# Patient Record
Sex: MALE | Race: OTHER | HISPANIC OR LATINO
[De-identification: names, ages, dates, MRNs, and addresses within clinical notes are randomized per-mention and may not be internally consistent; named-entity substitution may affect disease eponyms.]

---

## 2018-02-20 PROBLEM — Z00.00 ENCOUNTER FOR PREVENTIVE HEALTH EXAMINATION: Status: ACTIVE | Noted: 2018-02-20

## 2018-02-27 ENCOUNTER — APPOINTMENT (OUTPATIENT)
Dept: ORTHOPEDIC SURGERY | Facility: CLINIC | Age: 53
End: 2018-02-27

## 2018-03-15 ENCOUNTER — APPOINTMENT (OUTPATIENT)
Dept: RHEUMATOLOGY | Facility: CLINIC | Age: 53
End: 2018-03-15
Payer: MEDICAID

## 2018-03-15 VITALS
WEIGHT: 172 LBS | RESPIRATION RATE: 17 BRPM | OXYGEN SATURATION: 99 % | HEIGHT: 64 IN | TEMPERATURE: 98.4 F | SYSTOLIC BLOOD PRESSURE: 130 MMHG | BODY MASS INDEX: 29.37 KG/M2 | DIASTOLIC BLOOD PRESSURE: 80 MMHG | HEART RATE: 65 BPM

## 2018-03-15 DIAGNOSIS — K46.9 UNSPECIFIED ABDOMINAL HERNIA W/OUT OBSTRUCTION OR GANGRENE: ICD-10-CM

## 2018-03-15 DIAGNOSIS — Z78.9 OTHER SPECIFIED HEALTH STATUS: ICD-10-CM

## 2018-03-15 DIAGNOSIS — I10 ESSENTIAL (PRIMARY) HYPERTENSION: ICD-10-CM

## 2018-03-15 DIAGNOSIS — Z86.39 PERSONAL HISTORY OF OTHER ENDOCRINE, NUTRITIONAL AND METABOLIC DISEASE: ICD-10-CM

## 2018-03-15 PROCEDURE — 36415 COLL VENOUS BLD VENIPUNCTURE: CPT

## 2018-03-15 PROCEDURE — 99204 OFFICE O/P NEW MOD 45 MIN: CPT | Mod: 25

## 2018-03-15 RX ORDER — OMEPRAZOLE 20 MG/1
TABLET, DELAYED RELEASE ORAL
Refills: 0 | Status: ACTIVE | COMMUNITY

## 2018-03-15 RX ORDER — ENALAPRIL MALEATE 20 MG/1
20 TABLET ORAL
Refills: 0 | Status: ACTIVE | COMMUNITY

## 2018-03-21 ENCOUNTER — APPOINTMENT (OUTPATIENT)
Dept: ORTHOPEDIC SURGERY | Facility: CLINIC | Age: 53
End: 2018-03-21
Payer: MEDICAID

## 2018-03-21 ENCOUNTER — APPOINTMENT (OUTPATIENT)
Dept: NEUROLOGY | Facility: CLINIC | Age: 53
End: 2018-03-21
Payer: MEDICAID

## 2018-03-21 VITALS
DIASTOLIC BLOOD PRESSURE: 76 MMHG | WEIGHT: 172 LBS | SYSTOLIC BLOOD PRESSURE: 130 MMHG | BODY MASS INDEX: 29.37 KG/M2 | HEIGHT: 64 IN

## 2018-03-21 PROCEDURE — 99204 OFFICE O/P NEW MOD 45 MIN: CPT

## 2018-04-04 ENCOUNTER — APPOINTMENT (OUTPATIENT)
Dept: ORTHOPEDIC SURGERY | Facility: CLINIC | Age: 53
End: 2018-04-04
Payer: MEDICAID

## 2018-04-04 PROCEDURE — 99204 OFFICE O/P NEW MOD 45 MIN: CPT

## 2018-04-04 PROCEDURE — 73564 X-RAY EXAM KNEE 4 OR MORE: CPT | Mod: LT

## 2018-04-06 ENCOUNTER — FORM ENCOUNTER (OUTPATIENT)
Age: 53
End: 2018-04-06

## 2018-04-07 ENCOUNTER — APPOINTMENT (OUTPATIENT)
Dept: MRI IMAGING | Facility: CLINIC | Age: 53
End: 2018-04-07
Payer: MEDICAID

## 2018-04-07 ENCOUNTER — OUTPATIENT (OUTPATIENT)
Dept: OUTPATIENT SERVICES | Facility: HOSPITAL | Age: 53
LOS: 1 days | End: 2018-04-07
Payer: MEDICAID

## 2018-04-07 DIAGNOSIS — G44.89 OTHER HEADACHE SYNDROME: ICD-10-CM

## 2018-04-07 PROCEDURE — 70551 MRI BRAIN STEM W/O DYE: CPT | Mod: 26

## 2018-04-07 PROCEDURE — 70551 MRI BRAIN STEM W/O DYE: CPT

## 2018-04-11 ENCOUNTER — OUTPATIENT (OUTPATIENT)
Dept: OUTPATIENT SERVICES | Facility: HOSPITAL | Age: 53
LOS: 1 days | End: 2018-04-11
Payer: COMMERCIAL

## 2018-04-11 DIAGNOSIS — Z51.89 ENCOUNTER FOR OTHER SPECIFIED AFTERCARE: ICD-10-CM

## 2018-04-11 DIAGNOSIS — M25.561 PAIN IN RIGHT KNEE: ICD-10-CM

## 2018-04-11 DIAGNOSIS — M25.562 PAIN IN LEFT KNEE: ICD-10-CM

## 2018-04-23 ENCOUNTER — APPOINTMENT (OUTPATIENT)
Dept: NEUROLOGY | Facility: CLINIC | Age: 53
End: 2018-04-23
Payer: MEDICAID

## 2018-04-23 VITALS
BODY MASS INDEX: 29.37 KG/M2 | DIASTOLIC BLOOD PRESSURE: 84 MMHG | WEIGHT: 172 LBS | SYSTOLIC BLOOD PRESSURE: 126 MMHG | HEIGHT: 64 IN

## 2018-04-23 DIAGNOSIS — Z86.59 PERSONAL HISTORY OF OTHER MENTAL AND BEHAVIORAL DISORDERS: ICD-10-CM

## 2018-04-23 PROCEDURE — 99214 OFFICE O/P EST MOD 30 MIN: CPT

## 2018-05-10 PROCEDURE — 97140 MANUAL THERAPY 1/> REGIONS: CPT

## 2018-05-10 PROCEDURE — 97010 HOT OR COLD PACKS THERAPY: CPT

## 2018-05-10 PROCEDURE — 97110 THERAPEUTIC EXERCISES: CPT

## 2018-05-10 PROCEDURE — 97162 PT EVAL MOD COMPLEX 30 MIN: CPT

## 2018-05-18 ENCOUNTER — APPOINTMENT (OUTPATIENT)
Dept: RHEUMATOLOGY | Facility: CLINIC | Age: 53
End: 2018-05-18
Payer: MEDICAID

## 2018-05-18 VITALS
SYSTOLIC BLOOD PRESSURE: 120 MMHG | HEART RATE: 62 BPM | DIASTOLIC BLOOD PRESSURE: 72 MMHG | RESPIRATION RATE: 15 BRPM | OXYGEN SATURATION: 98 % | TEMPERATURE: 97.7 F

## 2018-05-18 DIAGNOSIS — M13.0 POLYARTHRITIS, UNSPECIFIED: ICD-10-CM

## 2018-05-18 PROBLEM — Z00.00 ENCOUNTER FOR PREVENTIVE HEALTH EXAMINATION: Noted: 2018-05-18

## 2018-05-18 PROCEDURE — 99214 OFFICE O/P EST MOD 30 MIN: CPT

## 2018-06-06 ENCOUNTER — APPOINTMENT (OUTPATIENT)
Dept: ORTHOPEDIC SURGERY | Facility: CLINIC | Age: 53
End: 2018-06-06

## 2018-06-11 ENCOUNTER — APPOINTMENT (OUTPATIENT)
Dept: NEUROLOGY | Facility: CLINIC | Age: 53
End: 2018-06-11

## 2018-06-11 ENCOUNTER — APPOINTMENT (OUTPATIENT)
Dept: ORTHOPEDIC SURGERY | Facility: CLINIC | Age: 53
End: 2018-06-11

## 2018-06-19 ENCOUNTER — APPOINTMENT (OUTPATIENT)
Dept: RHEUMATOLOGY | Facility: CLINIC | Age: 53
End: 2018-06-19

## 2018-07-30 ENCOUNTER — MEDICATION RENEWAL (OUTPATIENT)
Age: 53
End: 2018-07-30

## 2018-07-30 ENCOUNTER — RX RENEWAL (OUTPATIENT)
Age: 53
End: 2018-07-30

## 2018-07-30 DIAGNOSIS — G44.89 OTHER HEADACHE SYNDROME: ICD-10-CM

## 2018-10-29 ENCOUNTER — OTHER (OUTPATIENT)
Age: 53
End: 2018-10-29

## 2019-01-24 LAB
25(OH)D3 SERPL-MCNC: 34.5 NG/ML
A PHAGOCYTOPH IGG TITR SER IF: NORMAL TITER
ALBUMIN SERPL ELPH-MCNC: 4.1 G/DL
ALP BLD-CCNC: 97 U/L
ALT SERPL-CCNC: 30 U/L
ANA SER IF-ACNC: NEGATIVE
ANION GAP SERPL CALC-SCNC: 12 MMOL/L
APPEARANCE: CLEAR
AST SERPL-CCNC: 21 U/L
B BURGDOR AB SER QL IA: NEGATIVE
B BURGDOR AB SER-IMP: NEGATIVE
B BURGDOR IGM PATRN SER IB-IMP: NEGATIVE
B BURGDOR18/20KD IGM SER QL IB: NORMAL
B BURGDOR18KD IGG SER QL IB: NORMAL
B BURGDOR23KD IGG SER QL IB: NORMAL
B BURGDOR23KD IGM SER QL IB: NORMAL
B BURGDOR28KD AB SER QL IB: NORMAL
B BURGDOR28KD IGG SER QL IB: NORMAL
B BURGDOR30KD AB SER QL IB: NORMAL
B BURGDOR30KD IGG SER QL IB: NORMAL
B BURGDOR31KD IGG SER QL IB: NORMAL
B BURGDOR31KD IGM SER QL IB: NORMAL
B BURGDOR39KD IGG SER QL IB: NORMAL
B BURGDOR39KD IGM SER QL IB: NORMAL
B BURGDOR41KD IGG SER QL IB: PRESENT
B BURGDOR41KD IGM SER QL IB: NORMAL
B BURGDOR45KD AB SER QL IB: NORMAL
B BURGDOR45KD IGG SER QL IB: NORMAL
B BURGDOR58KD AB SER QL IB: NORMAL
B BURGDOR58KD IGG SER QL IB: NORMAL
B BURGDOR66KD IGG SER QL IB: NORMAL
B BURGDOR66KD IGM SER QL IB: NORMAL
B BURGDOR93KD IGG SER QL IB: NORMAL
B BURGDOR93KD IGM SER QL IB: NORMAL
B MICROTI IGG TITR SER: NORMAL TITER
BACTERIA: NEGATIVE
BASOPHILS # BLD AUTO: 0.02 K/UL
BASOPHILS NFR BLD AUTO: 0.2 %
BILIRUB SERPL-MCNC: 0.3 MG/DL
BILIRUBIN URINE: NEGATIVE
BLOOD URINE: ABNORMAL
BUN SERPL-MCNC: 15 MG/DL
CALCIUM SERPL-MCNC: 9.6 MG/DL
CCP AB SER IA-ACNC: <8 UNITS
CHLORIDE SERPL-SCNC: 103 MMOL/L
CO2 SERPL-SCNC: 27 MMOL/L
COLOR: YELLOW
CREAT SERPL-MCNC: 0.93 MG/DL
CRP SERPL-MCNC: 0.3 MG/DL
DSDNA AB SER-ACNC: <12 IU/ML
E CHAFFEENSIS IGG TITR SER IF: NORMAL TITER
ENA RNP AB SER IA-ACNC: 0.8 AL
ENA SM AB SER IA-ACNC: <0.2 AL
ENA SS-A AB SER IA-ACNC: <0.2 AL
ENA SS-B AB SER IA-ACNC: <0.2 AL
EOSINOPHIL # BLD AUTO: 0.2 K/UL
EOSINOPHIL NFR BLD AUTO: 2.2 %
ERYTHROCYTE [SEDIMENTATION RATE] IN BLOOD BY WESTERGREN METHOD: 23 MM/HR
GLUCOSE QUALITATIVE U: NEGATIVE MG/DL
GLUCOSE SERPL-MCNC: 101 MG/DL
HCT VFR BLD CALC: 43.6 %
HGB BLD-MCNC: 14.3 G/DL
IMM GRANULOCYTES NFR BLD AUTO: 0.2 %
KETONES URINE: NEGATIVE
LEUKOCYTE ESTERASE URINE: NEGATIVE
LYMPHOCYTES # BLD AUTO: 3.32 K/UL
LYMPHOCYTES NFR BLD AUTO: 36.7 %
MAN DIFF?: NORMAL
MCHC RBC-ENTMCNC: 29.6 PG
MCHC RBC-ENTMCNC: 32.8 GM/DL
MCV RBC AUTO: 90.3 FL
MICROSCOPIC-UA: NORMAL
MONOCYTES # BLD AUTO: 0.62 K/UL
MONOCYTES NFR BLD AUTO: 6.9 %
NEUTROPHILS # BLD AUTO: 4.87 K/UL
NEUTROPHILS NFR BLD AUTO: 53.8 %
NITRITE URINE: NEGATIVE
PH URINE: 5.5
PLATELET # BLD AUTO: 314 K/UL
POTASSIUM SERPL-SCNC: 4.6 MMOL/L
PROT SERPL-MCNC: 7.8 G/DL
PROTEIN URINE: NEGATIVE MG/DL
RBC # BLD: 4.83 M/UL
RBC # FLD: 13.5 %
RED BLOOD CELLS URINE: 19 /HPF
RF+CCP IGG SER-IMP: NEGATIVE
RHEUMATOID FACT SER QL: 8 IU/ML
SODIUM SERPL-SCNC: 142 MMOL/L
SPECIFIC GRAVITY URINE: 1.01
SQUAMOUS EPITHELIAL CELLS: 0 /HPF
THYROGLOB AB SERPL-ACNC: <20 IU/ML
THYROPEROXIDASE AB SERPL IA-ACNC: <10 IU/ML
URATE SERPL-MCNC: 6.6 MG/DL
UROBILINOGEN URINE: NEGATIVE MG/DL
WBC # FLD AUTO: 9.05 K/UL
WHITE BLOOD CELLS URINE: 0 /HPF

## 2020-10-28 ENCOUNTER — APPOINTMENT (OUTPATIENT)
Dept: RHEUMATOLOGY | Facility: CLINIC | Age: 55
End: 2020-10-28
Payer: MEDICAID

## 2020-10-28 VITALS
BODY MASS INDEX: 29.88 KG/M2 | TEMPERATURE: 98.3 F | HEART RATE: 67 BPM | RESPIRATION RATE: 17 BRPM | OXYGEN SATURATION: 98 % | SYSTOLIC BLOOD PRESSURE: 108 MMHG | WEIGHT: 175 LBS | DIASTOLIC BLOOD PRESSURE: 60 MMHG | HEIGHT: 64 IN

## 2020-10-28 PROCEDURE — 99214 OFFICE O/P EST MOD 30 MIN: CPT | Mod: 25

## 2020-10-28 PROCEDURE — 99072 ADDL SUPL MATRL&STAF TM PHE: CPT

## 2020-10-28 RX ORDER — AMITRIPTYLINE HYDROCHLORIDE 25 MG/1
25 TABLET, FILM COATED ORAL
Qty: 30 | Refills: 2 | Status: DISCONTINUED | COMMUNITY
Start: 2018-05-18 | End: 2020-10-28

## 2020-10-28 RX ORDER — CYCLOBENZAPRINE HYDROCHLORIDE 7.5 MG/1
TABLET, FILM COATED ORAL
Refills: 0 | Status: DISCONTINUED | COMMUNITY
End: 2020-10-28

## 2020-10-28 RX ORDER — MIRTAZAPINE 7.5 MG/1
TABLET, FILM COATED ORAL
Refills: 0 | Status: DISCONTINUED | COMMUNITY
End: 2020-10-28

## 2020-12-31 ENCOUNTER — RX RENEWAL (OUTPATIENT)
Age: 55
End: 2020-12-31

## 2021-01-27 ENCOUNTER — APPOINTMENT (OUTPATIENT)
Dept: RHEUMATOLOGY | Facility: CLINIC | Age: 56
End: 2021-01-27

## 2021-02-17 ENCOUNTER — APPOINTMENT (OUTPATIENT)
Dept: RHEUMATOLOGY | Facility: CLINIC | Age: 56
End: 2021-02-17
Payer: MEDICAID

## 2021-02-17 VITALS
TEMPERATURE: 97.1 F | WEIGHT: 185 LBS | OXYGEN SATURATION: 96 % | DIASTOLIC BLOOD PRESSURE: 84 MMHG | RESPIRATION RATE: 17 BRPM | HEART RATE: 80 BPM | BODY MASS INDEX: 31.58 KG/M2 | SYSTOLIC BLOOD PRESSURE: 120 MMHG | HEIGHT: 64 IN

## 2021-02-17 DIAGNOSIS — M54.5 LOW BACK PAIN: ICD-10-CM

## 2021-02-17 DIAGNOSIS — M22.42 CHONDROMALACIA PATELLAE, LEFT KNEE: ICD-10-CM

## 2021-02-17 DIAGNOSIS — M22.41 CHONDROMALACIA PATELLAE, RIGHT KNEE: ICD-10-CM

## 2021-02-17 DIAGNOSIS — G89.29 LOW BACK PAIN: ICD-10-CM

## 2021-02-17 PROCEDURE — 96372 THER/PROPH/DIAG INJ SC/IM: CPT

## 2021-02-17 PROCEDURE — 99072 ADDL SUPL MATRL&STAF TM PHE: CPT

## 2021-02-17 PROCEDURE — 99214 OFFICE O/P EST MOD 30 MIN: CPT | Mod: 25

## 2021-02-17 RX ORDER — METHYLPRED ACET/NACL,ISO-OS/PF 40 MG/ML
40 VIAL (ML) INJECTION
Qty: 1 | Refills: 0 | Status: COMPLETED | OUTPATIENT
Start: 2021-02-17

## 2021-02-17 RX ORDER — ETODOLAC 500 MG/1
500 TABLET, FILM COATED, EXTENDED RELEASE ORAL
Qty: 30 | Refills: 1 | Status: DISCONTINUED | COMMUNITY
Start: 2020-10-28 | End: 2021-02-17

## 2021-02-17 RX ORDER — NAPROXEN 500 MG/1
500 TABLET ORAL
Qty: 60 | Refills: 0 | Status: DISCONTINUED | COMMUNITY
Start: 2018-03-15 | End: 2021-02-17

## 2021-02-17 RX ORDER — DULOXETINE HYDROCHLORIDE 30 MG/1
30 CAPSULE, DELAYED RELEASE PELLETS ORAL
Qty: 90 | Refills: 0 | Status: DISCONTINUED | COMMUNITY
Start: 2020-10-28 | End: 2021-02-17

## 2021-02-17 RX ADMIN — METHYLPREDNISOLONE ACETATE MG/ML: 40 INJECTION, SUSPENSION INTRA-ARTICULAR; INTRALESIONAL; INTRAMUSCULAR; SOFT TISSUE at 00:00

## 2021-02-17 NOTE — CONSULT LETTER
[Dear  ___] : Dear  [unfilled], [Consult Letter:] : I had the pleasure of evaluating your patient, [unfilled]. [Please see my note below.] : Please see my note below. [Consult Closing:] : Thank you very much for allowing me to participate in the care of this patient.  If you have any questions, please do not hesitate to contact me. [Sincerely,] : Sincerely, [FreeTextEntry2] :  [phone] [FreeTextEntry3] : Vincent Aguilar II, MD\par \par Division of Rheumatology\par [facility]\par    [facility] &\par    Rheumatology at [place],\par    [facility]\par [facility] [employer] [facility]\par \par Office: [phone]

## 2021-02-17 NOTE — ASSESSMENT
[FreeTextEntry1] : 1. OA - hands and knees. Increasing polyarthralgia w/ degenerative and hypertrophic changes in hands/knees. No inflammatory arthritis\par 2. Chronic pain syndrome, FMS - reports and exam w/ tender points. Appears more bothersome now- tender points much more tender, fatigue increased. No response to SNRI, TCA, gabapentin. Discussed Lyrica today\par \par - start diclofenac 75mg BID\par - IM Depomedrol 40mg today in LUE\par - start Lyrica 100mg BID, will need prior auth- d/w patient the process\par \par RTO 2 mos

## 2021-02-17 NOTE — HISTORY OF PRESENT ILLNESS
[___ Month(s) Ago] : [unfilled] month(s) ago [FreeTextEntry1] : Last seen 10/2020\par Mostly c/o hand pain, wrist pain - denies swelling, redness, warmth\par Diffuse body pains in elbows at medial/lat epicondyles, bicipital tendons, shoulders, upper and lower  points, gluteal tenderness, lateral hip pain, knee pain in joint and inferomedially, pain in feet.\par PRevious labs normal for inflammatory markers and negative serologies- no RA or inflammatory systemic disease.\par Discussed OA and degenerative arthritis today, as well as FMS\par Pain 6-7/10 a/w 15 min AM stiffness\par ROS otherwise unchanged-  denies constitutional sxs of fever/chills, weight loss, alopecia, oral/nasal ulcers, sicca sxs, CP/SOB, hematuria/frothy urine, myalgias, Raynaud's, rash, nodules, or photosensitivity. \par Currently on gabapentin, duloxetine 30mg/d, etodolac ER 500mg day without any effects on his pain\par DIscussed need for activity - remains active with work but does not do any stretching or ROM. [de-identified] : \par \par All other ROS negative except as mentioned in HPI.

## 2021-02-17 NOTE — PHYSICAL EXAM
[General Appearance - Alert] : alert [General Appearance - In No Acute Distress] : in no acute distress [General Appearance - Well Nourished] : well nourished [General Appearance - Well Developed] : well developed [General Appearance - Well-Appearing] : healthy appearing [Sclera] : the sclera and conjunctiva were normal [PERRL With Normal Accommodation] : pupils were equal in size, round, and reactive to light [Extraocular Movements] : extraocular movements were intact [Outer Ear] : the ears and nose were normal in appearance [Hearing Threshold Finger Rub Not [place]] : hearing was normal [Examination Of The Oral Cavity] : the lips and gums were normal [Nasal Cavity] : the nasal mucosa and septum were normal [Neck Appearance] : the appearance of the neck was normal [Oropharynx] : the oropharynx was normal [Neck Cervical Mass (___cm)] : no neck mass was observed [Jugular Venous Distention Increased] : there was no jugular-venous distention [Thyroid Diffuse Enlargement] : the thyroid was not enlarged [Thyroid Nodule] : there were no palpable thyroid nodules [Respiration, Rhythm And Depth] : normal respiratory rhythm and effort [Auscultation Breath Sounds / Voice Sounds] : lungs were clear to auscultation bilaterally [Heart Rate And Rhythm] : heart rate was normal and rhythm regular [Heart Sounds] : normal S1 and S2 [Heart Sounds Gallop] : no gallops [Murmurs] : no murmurs [Heart Sounds Pericardial Friction Rub] : no pericardial rub [Full Pulse] : the pedal pulses are present [Edema] : there was no peripheral edema [Veins - Varicosity Changes] : there were no varicosital changes [Bowel Sounds] : normal bowel sounds [Abdomen Soft] : soft [Abdomen Tenderness] : non-tender [Abdomen Mass (___ Cm)] : no abdominal mass palpated [Cervical Lymph Nodes Enlarged Posterior Bilaterally] : posterior cervical [Cervical Lymph Nodes Enlarged Anterior Bilaterally] : anterior cervical [Supraclavicular Lymph Nodes Enlarged Bilaterally] : supraclavicular [No CVA Tenderness] : no ~M costovertebral angle tenderness [Nail Clubbing] : no clubbing  or cyanosis of the fingernails [Abnormal Walk] : normal gait [Musculoskeletal - Swelling] : no joint swelling seen [Motor Tone] : muscle strength and tone were normal [Skin Color & Pigmentation] : normal skin color and pigmentation [Skin Turgor] : normal skin turgor [] : no rash [Skin Lesions] : no skin lesions [Deep Tendon Reflexes (DTR)] : deep tendon reflexes were 2+ and symmetric [Sensation] : the sensory exam was normal to light touch and pinprick [Motor Exam] : the motor exam was normal [No Focal Deficits] : no focal deficits [Oriented To Time, Place, And Person] : oriented to person, place, and time [Impaired Insight] : insight and judgment were intact [Affect] : the affect was normal [Mood] : the mood was normal [FreeTextEntry1] : \par Hands- tender MCPs and PIPs w/o synovitis. Mild degen changes\par Wrists- normal\par Elbows- normal, tender lateral > medial epicondyles w/o swelling\par Shoulders- Tender ROM, intact. Tender biciptal tendons upper > lower bilaterally\par Hips- normal ROM, tender lateral greater trochanters\par Knees- Patellofemoral crepitus bilaterally without effusion.  Tender inferomedially bilaterally at pes anserine bursa w/ swelling\par Ankles- normal\par Feet- normal, tender midfoot w/o swelling\par MMT 10/10 proximally/distally bilaterally

## 2021-02-27 ENCOUNTER — INPATIENT (INPATIENT)
Facility: HOSPITAL | Age: 56
LOS: 28 days | Discharge: HOME | DRG: 177 | End: 2021-03-28
Attending: STUDENT IN AN ORGANIZED HEALTH CARE EDUCATION/TRAINING PROGRAM | Admitting: HOSPITALIST
Payer: COMMERCIAL

## 2021-02-27 VITALS
DIASTOLIC BLOOD PRESSURE: 88 MMHG | SYSTOLIC BLOOD PRESSURE: 146 MMHG | HEART RATE: 96 BPM | OXYGEN SATURATION: 73 % | RESPIRATION RATE: 60 BRPM

## 2021-02-27 DIAGNOSIS — U07.1 COVID-19: ICD-10-CM

## 2021-02-27 LAB
ALBUMIN SERPL ELPH-MCNC: 3.3 G/DL — SIGNIFICANT CHANGE UP (ref 3.3–5.2)
ALP SERPL-CCNC: 86 U/L — SIGNIFICANT CHANGE UP (ref 40–120)
ALT FLD-CCNC: 49 U/L — HIGH
ANION GAP SERPL CALC-SCNC: 15 MMOL/L — SIGNIFICANT CHANGE UP (ref 5–17)
APTT BLD: 29.8 SEC — SIGNIFICANT CHANGE UP (ref 27.5–35.5)
AST SERPL-CCNC: 55 U/L — HIGH
BASOPHILS # BLD AUTO: 0.03 K/UL — SIGNIFICANT CHANGE UP (ref 0–0.2)
BASOPHILS NFR BLD AUTO: 0.2 % — SIGNIFICANT CHANGE UP (ref 0–2)
BILIRUB SERPL-MCNC: 0.3 MG/DL — LOW (ref 0.4–2)
BUN SERPL-MCNC: 16 MG/DL — SIGNIFICANT CHANGE UP (ref 8–20)
CALCIUM SERPL-MCNC: 8.6 MG/DL — SIGNIFICANT CHANGE UP (ref 8.6–10.2)
CHLORIDE SERPL-SCNC: 100 MMOL/L — SIGNIFICANT CHANGE UP (ref 98–107)
CO2 SERPL-SCNC: 24 MMOL/L — SIGNIFICANT CHANGE UP (ref 22–29)
CREAT SERPL-MCNC: 1.13 MG/DL — SIGNIFICANT CHANGE UP (ref 0.5–1.3)
CRP SERPL-MCNC: 16.87 MG/DL — HIGH (ref 0–0.4)
D DIMER BLD IA.RAPID-MCNC: 224 NG/ML DDU — SIGNIFICANT CHANGE UP
EOSINOPHIL # BLD AUTO: 0 K/UL — SIGNIFICANT CHANGE UP (ref 0–0.5)
EOSINOPHIL NFR BLD AUTO: 0 % — SIGNIFICANT CHANGE UP (ref 0–6)
FERRITIN SERPL-MCNC: 659 NG/ML — HIGH (ref 30–400)
GLUCOSE SERPL-MCNC: 145 MG/DL — HIGH (ref 70–99)
HCT VFR BLD CALC: 44.2 % — SIGNIFICANT CHANGE UP (ref 39–50)
HGB BLD-MCNC: 14.5 G/DL — SIGNIFICANT CHANGE UP (ref 13–17)
IMM GRANULOCYTES NFR BLD AUTO: 1 % — SIGNIFICANT CHANGE UP (ref 0–1.5)
INR BLD: 1.15 RATIO — SIGNIFICANT CHANGE UP (ref 0.88–1.16)
LYMPHOCYTES # BLD AUTO: 1.22 K/UL — SIGNIFICANT CHANGE UP (ref 1–3.3)
LYMPHOCYTES # BLD AUTO: 8.8 % — LOW (ref 13–44)
MCHC RBC-ENTMCNC: 30 PG — SIGNIFICANT CHANGE UP (ref 27–34)
MCHC RBC-ENTMCNC: 32.8 GM/DL — SIGNIFICANT CHANGE UP (ref 32–36)
MCV RBC AUTO: 91.3 FL — SIGNIFICANT CHANGE UP (ref 80–100)
MONOCYTES # BLD AUTO: 0.66 K/UL — SIGNIFICANT CHANGE UP (ref 0–0.9)
MONOCYTES NFR BLD AUTO: 4.8 % — SIGNIFICANT CHANGE UP (ref 2–14)
NEUTROPHILS # BLD AUTO: 11.83 K/UL — HIGH (ref 1.8–7.4)
NEUTROPHILS NFR BLD AUTO: 85.2 % — HIGH (ref 43–77)
PLATELET # BLD AUTO: 260 K/UL — SIGNIFICANT CHANGE UP (ref 150–400)
POTASSIUM SERPL-MCNC: 3.6 MMOL/L — SIGNIFICANT CHANGE UP (ref 3.5–5.3)
POTASSIUM SERPL-SCNC: 3.6 MMOL/L — SIGNIFICANT CHANGE UP (ref 3.5–5.3)
PROCALCITONIN SERPL-MCNC: 0.25 NG/ML — HIGH (ref 0.02–0.1)
PROT SERPL-MCNC: 7.4 G/DL — SIGNIFICANT CHANGE UP (ref 6.6–8.7)
PROTHROM AB SERPL-ACNC: 13.2 SEC — SIGNIFICANT CHANGE UP (ref 10.6–13.6)
RBC # BLD: 4.84 M/UL — SIGNIFICANT CHANGE UP (ref 4.2–5.8)
RBC # FLD: 13.2 % — SIGNIFICANT CHANGE UP (ref 10.3–14.5)
SODIUM SERPL-SCNC: 139 MMOL/L — SIGNIFICANT CHANGE UP (ref 135–145)
TROPONIN T SERPL-MCNC: <0.01 NG/ML — SIGNIFICANT CHANGE UP (ref 0–0.06)
WBC # BLD: 13.88 K/UL — HIGH (ref 3.8–10.5)
WBC # FLD AUTO: 13.88 K/UL — HIGH (ref 3.8–10.5)

## 2021-02-27 PROCEDURE — 99291 CRITICAL CARE FIRST HOUR: CPT

## 2021-02-27 PROCEDURE — 71045 X-RAY EXAM CHEST 1 VIEW: CPT | Mod: 26

## 2021-02-27 PROCEDURE — 99233 SBSQ HOSP IP/OBS HIGH 50: CPT

## 2021-02-27 RX ORDER — ALBUTEROL 90 UG/1
4 AEROSOL, METERED ORAL ONCE
Refills: 0 | Status: COMPLETED | OUTPATIENT
Start: 2021-02-27 | End: 2021-02-27

## 2021-02-27 RX ORDER — DEXAMETHASONE 0.5 MG/5ML
6 ELIXIR ORAL ONCE
Refills: 0 | Status: COMPLETED | OUTPATIENT
Start: 2021-02-27 | End: 2021-02-27

## 2021-02-27 RX ORDER — ACETAMINOPHEN 500 MG
975 TABLET ORAL ONCE
Refills: 0 | Status: COMPLETED | OUTPATIENT
Start: 2021-02-27 | End: 2021-02-27

## 2021-02-27 RX ADMIN — Medication 975 MILLIGRAM(S): at 22:36

## 2021-02-27 RX ADMIN — ALBUTEROL 4 PUFF(S): 90 AEROSOL, METERED ORAL at 22:06

## 2021-02-27 RX ADMIN — Medication 6 MILLIGRAM(S): at 22:09

## 2021-02-27 NOTE — H&P ADULT - NSHPLABSRESULTS_GEN_ALL_CORE
14.5   13.88 )-----------( 260      ( 27 Feb 2021 22:13 )             44.2   ABG - ( 27 Feb 2021 23:59 )  pH, Arterial: 7.46  pH, Blood: x     /  pCO2: 39    /  pO2: 88    / HCO3: 27    / Base Excess: 3.4   /  SaO2: 97              02-27    139  |  100  |  16.0  ----------------------------<  145<H>  3.6   |  24.0  |  1.13    Ca    8.6      27 Feb 2021 22:13    TPro  7.4  /  Alb  3.3  /  TBili  0.3<L>  /  DBili  x   /  AST  55<H>  /  ALT  49<H>  /  AlkPhos  86  02-27

## 2021-02-27 NOTE — ED ADULT NURSE NOTE - NSIMPLEMENTINTERV_GEN_ALL_ED
Implemented All Fall with Harm Risk Interventions:  [place] to call system. Call bell, personal items and telephone within reach. Instruct patient to call for assistance. Room bathroom lighting operational. Non-slip footwear when patient is off stretcher. Physically safe environment: no spills, clutter or unnecessary equipment. Stretcher in lowest position, wheels locked, appropriate side rails in place. Provide visual cue, wrist band, yellow gown, etc. Monitor gait and stability. Monitor for mental status changes and reorient to person, place, and time. Review medications for side effects contributing to fall risk. Reinforce activity limits and safety measures with patient and family. Provide visual clues: red socks.

## 2021-02-27 NOTE — ED ADULT NURSE REASSESSMENT NOTE - NS ED NURSE REASSESS COMMENT FT1
received care of patient at this time, cardiac monitor/ in place. patient on BIPAP and tolerating well. MICU PA Teta at bedside for eval, IVP Morphine 2mg given as ordered for tachypnea. awaiting ABG results and MICU bed at this time. will continue to monitor.

## 2021-02-27 NOTE — ED PROVIDER NOTE - CARE PLAN
Principal Discharge DX:	Acute respiratory failure due to severe acute respiratory syndrome coronavirus 2 (SARS-CoV-2) infection

## 2021-02-27 NOTE — H&P ADULT - HISTORY OF PRESENT ILLNESS
Patient is a 55 year old male with a pmhx of HTN who presents with shortness of breath. Patient explains his symptoms began 2/22 with cough, kervin, myalgias, nausea, and SOB. Then went to urgent care and was found to be covid + on 2/22. Since then symptoms have gotten worse. Then came to Ed for severe shortness of breath. EMS found him with sats of 50s on room air. Then upon arrival he was placed on bipap for increase work of breathing. Sats improved but RR did not.. MICU consult placed. Pt seen and examined at bedside with Ed  and at the moment patient is without medical complaints. He explains that he feels "fine"

## 2021-02-27 NOTE — ED ADULT NURSE NOTE - CHIEF COMPLAINT
[address] Patient Status:  Emergency   Age/Gender 66year old male MRN [id number]   Location [address] [facility] Day # 0 PCP Arsen Dunlap DO       Anesthesia Post-op Note
The patient is a 55y Male complaining of

## 2021-02-27 NOTE — ED ADULT TRIAGE NOTE - CHIEF COMPLAINT QUOTE
pt diagnosed with covid on Monday sick since last night. pt o2 sat 50% RA, 70% on NRB, MD called to bedside.

## 2021-02-27 NOTE — ED ADULT NURSE NOTE - CAS TRG GENERAL AIRWAY, MLM
Patent
Alert and oriented to person, place, time/situation. normal mood and affect. no apparent risk to self or others.

## 2021-02-27 NOTE — ED ADULT NURSE NOTE - OBJECTIVE STATEMENT
Patient known covid positive day 7 of symptoms presents with respiratory distress. arrives on NRB saturating 79%.  Placed on bipap in ED with improvement to high 90s. RR 50s. Dr. Miranda and critical care team at bedside. Patient normal sinus rhythm on cardiac monitor. Placed in isolation room.

## 2021-02-27 NOTE — H&P ADULT - ASSESSMENT
Patient is a 55 year old male with a pmhx of HTN who presents with shortness of breath, found to have:    1. Acute hypoxic resp failure   2. covid pna  3. viral pna    Plan  Neuro: prn morphine and may need precedex to help with increase work of breathing  Cardio: hold lisinopril for now   Pulm: cxr with b/l infiltrates L worst then R. Placed on bipap with improvement of saturation but still has labored and increase work of breathing. Will need to keep on bipap for now. check abg. Very high risk for intubation.  GI: npo with on nippv  Renal: strict i/os  Id: Covid +. symptoms started ~ 1 week ago. Start remdesivir   Endo: dexamethasone  6mg daily x 10 days   Heme: lovenox 40mg BID. D-dimer less than 300     Dispo: Admit to MICU, high risk for decompensation intubation and death  Patient is a 55 year old male with a pmhx of HTN who presents with shortness of breath, found to have:    1. Acute hypoxic resp failure   2. covid pna  3. viral pna    Plan  Neuro: prn morphine and may need precedex to help with increase work of breathing  Cardio: hold lisinopril for now   Pulm: cxr with b/l infiltrates L worst then R. Placed on bipap with improvement of saturation but still has labored and increase work of breathing. Will need to keep on bipap for now. check abg. Very high risk for intubation.  GI: npo with on nippv. start PPI  Renal: strict i/os  Id: Covid +. symptoms started ~ 1 week ago. Start remdesivir   Endo: dexamethasone  6mg daily x 10 days   Heme: lovenox 40mg BID. D-dimer less than 300     Dispo: Admit to MICU, high risk for decompensation intubation and death  Patient is a 55 year old male with a pmhx of HTN who presents with shortness of breath, found to have:    1. Acute hypoxic resp failure   2. covid pna  3. viral pna    Plan  Neuro: prn morphine and may need precedex to help with increase work of breathing  Cardio: hold lisinopril for now   Pulm: cxr with b/l infiltrates L worst then R. Placed on bipap with improvement of saturation but still has labored and increase work of breathing. RR 45Will need to keep on bipap for now. check abg. Very high risk for intubation.  GI: npo with on nippv. start PPI  Renal: strict i/os  Id: Covid +. symptoms started ~ 1 week ago. Start remdesivir   Endo: dexamethasone  6mg daily x 10 days   Heme: lovenox 40mg BID. D-dimer less than 300     Dispo: Admit to MICU, high risk for decompensation intubation and death  Patient is a 55 year old male with a pmhx of HTN who presents with shortness of breath, found to have:    1. Acute hypoxic resp failure   2. covid pna  3. viral pna    Plan  Neuro: prn morphine and may need precedex to help with increase work of breathing  Cardio: hold lisinopril for now   Pulm: cxr with b/l infiltrates L worst then R. Placed on bipap with improvement of saturation but still has labored and increase work of breathing. RR 45Will need to keep on bipap for now. check abg. Very high risk for intubation.  GI: npo with on nippv. start PPI  Renal: strict i/os  Id: Covid +. symptoms started ~ 1 week ago. Start remdesivir   Endo: dexamethasone  6mg daily x 10 days   Heme: lovenox 40mg BID. D-dimer less than 300     Dispo: Admit to MICU, high risk for decompensation intubation and death     Critical Care time: 35 mins assessing presenting problems of acute illness that poses high probability of life threatening deterioration or end organ damage/dysfunction.  Medical decision making inculding Initiating plan of care, reviewing data, reviewing radiology,direct patient bedside evaluation and interpretation of vital signs, any necessary ventilator management , discusion with multidisciplinary team, discussing goals of care with patient/family, all non inclusive of procedures

## 2021-02-27 NOTE — ED PROVIDER NOTE - PRINCIPAL DIAGNOSIS
Acute respiratory failure due to severe acute respiratory syndrome coronavirus 2 (SARS-CoV-2) infection

## 2021-02-27 NOTE — H&P ADULT - NSHPPHYSICALEXAM_GEN_ALL_CORE
General: Adult male in resp distress, + tachypnea + diaphoretic   heent: no JVD  cardio: +s1/s2  pulm: lung clear, no sputum   abd: General: Adult male in resp distress, + tachypnea + diaphoretic   heent: no JVD  cardio: +s1/s2  pulm: lung clear, no sputum   abd: soft, nontender   neuro: awake and alert

## 2021-02-27 NOTE — ED PROVIDER NOTE - CRITICAL CARE ATTENDING CONTRIBUTION TO CARE
Pt presented in acute respiratory distress due to covid. Placed on bipap and stabilized and admitted to icu

## 2021-02-27 NOTE — ED PROVIDER NOTE - OBJECTIVE STATEMENT
55 year old male with PMH HTN presents with SOB. Pt reports Sx beginning on 2/20 with cough, kervin, myalgias, nausea, and SOB. He tested positive for covid on 2/22 at urgent care. He states his Sx have been progressive, constant and diffuse. He denies chest pain, abd pain, hemoptysis, LE edema. He called 911 and when EMS arrived he was noted to be markedly hypoxic to 50s. Arrives on NRB with pulse ox 79% and markedly tachypnic RR 20.

## 2021-02-27 NOTE — ED PROVIDER NOTE - CLINICAL SUMMARY MEDICAL DECISION MAKING FREE TEXT BOX
pt with acute respiratory failure due to covid. Hypoxia improved but with persistent increased work of breathing. Admitted to icu

## 2021-02-28 DIAGNOSIS — I10 ESSENTIAL (PRIMARY) HYPERTENSION: ICD-10-CM

## 2021-02-28 DIAGNOSIS — U07.1 COVID-19: ICD-10-CM

## 2021-02-28 DIAGNOSIS — J96.00 ACUTE RESPIRATORY FAILURE, UNSPECIFIED WHETHER WITH HYPOXIA OR HYPERCAPNIA: ICD-10-CM

## 2021-02-28 LAB
A1C WITH ESTIMATED AVERAGE GLUCOSE RESULT: 6 % — HIGH (ref 4–5.6)
ALBUMIN SERPL ELPH-MCNC: 3.1 G/DL — LOW (ref 3.3–5.2)
ALP SERPL-CCNC: 86 U/L — SIGNIFICANT CHANGE UP (ref 40–120)
ALT FLD-CCNC: 48 U/L — HIGH
AST SERPL-CCNC: 50 U/L — HIGH
BILIRUB DIRECT SERPL-MCNC: 0.1 MG/DL — SIGNIFICANT CHANGE UP (ref 0–0.3)
BILIRUB INDIRECT FLD-MCNC: 0.2 MG/DL — SIGNIFICANT CHANGE UP (ref 0.2–1)
BILIRUB SERPL-MCNC: 0.3 MG/DL — LOW (ref 0.4–2)
CREAT SERPL-MCNC: 0.89 MG/DL — SIGNIFICANT CHANGE UP (ref 0.5–1.3)
ESTIMATED AVERAGE GLUCOSE: 126 MG/DL — HIGH (ref 68–114)
GAS PNL BLDA: SIGNIFICANT CHANGE UP
GLUCOSE BLDC GLUCOMTR-MCNC: 131 MG/DL — HIGH (ref 70–99)
GLUCOSE BLDC GLUCOMTR-MCNC: 140 MG/DL — HIGH (ref 70–99)
GLUCOSE BLDC GLUCOMTR-MCNC: 143 MG/DL — HIGH (ref 70–99)
GLUCOSE BLDC GLUCOMTR-MCNC: 184 MG/DL — HIGH (ref 70–99)
INR BLD: 1.19 RATIO — HIGH (ref 0.88–1.16)
PROT SERPL-MCNC: 7.4 G/DL — SIGNIFICANT CHANGE UP (ref 6.6–8.7)
PROTHROM AB SERPL-ACNC: 13.7 SEC — HIGH (ref 10.6–13.6)
SARS-COV-2 IGG SERPL QL IA: NEGATIVE — SIGNIFICANT CHANGE UP
SARS-COV-2 IGM SERPL IA-ACNC: 0.14 INDEX — SIGNIFICANT CHANGE UP
SARS-COV-2 RNA SPEC QL NAA+PROBE: DETECTED

## 2021-02-28 PROCEDURE — 99223 1ST HOSP IP/OBS HIGH 75: CPT

## 2021-02-28 PROCEDURE — 93010 ELECTROCARDIOGRAM REPORT: CPT

## 2021-02-28 PROCEDURE — 93970 EXTREMITY STUDY: CPT | Mod: 26

## 2021-02-28 RX ORDER — LISINOPRIL 2.5 MG/1
1 TABLET ORAL
Qty: 0 | Refills: 0 | DISCHARGE

## 2021-02-28 RX ORDER — GLUCAGON INJECTION, SOLUTION 0.5 MG/.1ML
1 INJECTION, SOLUTION SUBCUTANEOUS ONCE
Refills: 0 | Status: DISCONTINUED | OUTPATIENT
Start: 2021-02-28 | End: 2021-03-28

## 2021-02-28 RX ORDER — SODIUM CHLORIDE 9 MG/ML
1000 INJECTION, SOLUTION INTRAVENOUS
Refills: 0 | Status: DISCONTINUED | OUTPATIENT
Start: 2021-02-28 | End: 2021-03-28

## 2021-02-28 RX ORDER — DEXTROSE 50 % IN WATER 50 %
25 SYRINGE (ML) INTRAVENOUS ONCE
Refills: 0 | Status: DISCONTINUED | OUTPATIENT
Start: 2021-02-28 | End: 2021-03-28

## 2021-02-28 RX ORDER — REMDESIVIR 5 MG/ML
INJECTION INTRAVENOUS
Refills: 0 | Status: COMPLETED | OUTPATIENT
Start: 2021-02-28 | End: 2021-03-04

## 2021-02-28 RX ORDER — FAMOTIDINE 10 MG/ML
20 INJECTION INTRAVENOUS
Refills: 0 | Status: DISCONTINUED | OUTPATIENT
Start: 2021-02-28 | End: 2021-03-12

## 2021-02-28 RX ORDER — LISINOPRIL 2.5 MG/1
20 TABLET ORAL DAILY
Refills: 0 | Status: DISCONTINUED | OUTPATIENT
Start: 2021-02-28 | End: 2021-03-05

## 2021-02-28 RX ORDER — LISINOPRIL 2.5 MG/1
0 TABLET ORAL
Qty: 0 | Refills: 0 | DISCHARGE

## 2021-02-28 RX ORDER — MORPHINE SULFATE 50 MG/1
2 CAPSULE, EXTENDED RELEASE ORAL EVERY 4 HOURS
Refills: 0 | Status: DISCONTINUED | OUTPATIENT
Start: 2021-02-28 | End: 2021-03-02

## 2021-02-28 RX ORDER — INSULIN LISPRO 100/ML
VIAL (ML) SUBCUTANEOUS EVERY 6 HOURS
Refills: 0 | Status: DISCONTINUED | OUTPATIENT
Start: 2021-02-28 | End: 2021-03-02

## 2021-02-28 RX ORDER — REMDESIVIR 5 MG/ML
200 INJECTION INTRAVENOUS EVERY 24 HOURS
Refills: 0 | Status: COMPLETED | OUTPATIENT
Start: 2021-02-28 | End: 2021-02-28

## 2021-02-28 RX ORDER — ENOXAPARIN SODIUM 100 MG/ML
40 INJECTION SUBCUTANEOUS
Refills: 0 | Status: DISCONTINUED | OUTPATIENT
Start: 2021-02-28 | End: 2021-03-05

## 2021-02-28 RX ORDER — DEXAMETHASONE 0.5 MG/5ML
6 ELIXIR ORAL DAILY
Refills: 0 | Status: COMPLETED | OUTPATIENT
Start: 2021-02-28 | End: 2021-03-08

## 2021-02-28 RX ORDER — DEXTROSE 50 % IN WATER 50 %
12.5 SYRINGE (ML) INTRAVENOUS ONCE
Refills: 0 | Status: DISCONTINUED | OUTPATIENT
Start: 2021-02-28 | End: 2021-03-28

## 2021-02-28 RX ORDER — DEXTROSE 50 % IN WATER 50 %
15 SYRINGE (ML) INTRAVENOUS ONCE
Refills: 0 | Status: DISCONTINUED | OUTPATIENT
Start: 2021-02-28 | End: 2021-03-28

## 2021-02-28 RX ORDER — DEXMEDETOMIDINE HYDROCHLORIDE IN 0.9% SODIUM CHLORIDE 4 UG/ML
0.3 INJECTION INTRAVENOUS
Qty: 200 | Refills: 0 | Status: DISCONTINUED | OUTPATIENT
Start: 2021-02-28 | End: 2021-03-01

## 2021-02-28 RX ORDER — REMDESIVIR 5 MG/ML
100 INJECTION INTRAVENOUS EVERY 24 HOURS
Refills: 0 | Status: COMPLETED | OUTPATIENT
Start: 2021-03-01 | End: 2021-03-04

## 2021-02-28 RX ADMIN — FAMOTIDINE 20 MILLIGRAM(S): 10 INJECTION INTRAVENOUS at 05:24

## 2021-02-28 RX ADMIN — Medication 6 MILLIGRAM(S): at 05:24

## 2021-02-28 RX ADMIN — MORPHINE SULFATE 2 MILLIGRAM(S): 50 CAPSULE, EXTENDED RELEASE ORAL at 05:40

## 2021-02-28 RX ADMIN — FAMOTIDINE 20 MILLIGRAM(S): 10 INJECTION INTRAVENOUS at 17:44

## 2021-02-28 RX ADMIN — ENOXAPARIN SODIUM 40 MILLIGRAM(S): 100 INJECTION SUBCUTANEOUS at 17:44

## 2021-02-28 RX ADMIN — REMDESIVIR 500 MILLIGRAM(S): 5 INJECTION INTRAVENOUS at 03:28

## 2021-02-28 RX ADMIN — MORPHINE SULFATE 2 MILLIGRAM(S): 50 CAPSULE, EXTENDED RELEASE ORAL at 22:08

## 2021-02-28 RX ADMIN — Medication 2: at 05:24

## 2021-02-28 RX ADMIN — ENOXAPARIN SODIUM 40 MILLIGRAM(S): 100 INJECTION SUBCUTANEOUS at 05:24

## 2021-02-28 NOTE — PROGRESS NOTE ADULT - SUBJECTIVE AND OBJECTIVE BOX
Patient is a 55 year old male with a pmhx of HTN who presents with shortness of breath. Patient explains his symptoms began 2/22 with cough, kervin, myalgias, nausea, and SOB. Then went to urgent care and was found to be covid + on 2/22. Since then symptoms have gotten worse. Then came to Ed for severe shortness of breath. EMS found him with sats of 50s on room air. Then upon arrival he was placed on bipap for increase work of breathing. Sats improved but RR did not.    Pt was admitted overnight and placed on NIPPV.  This am he was on NIPPV bipap 14/6, his fi02 was weaned from 100% to 75%.  He was trialed on high flow 02 at 100%.  He has tolerated but due to work of breathing he will need to go back on NIPPV but he wanted a break and some water.  Spoke to pt's wife Dolly, she was updated on his condition and her questions answered, we were able to obtain home meds with doses which were added to chart and started.   Patient is a 55y old  Male who presents with a chief complaint of covid hypoxia (28 Feb 2021 12:39)      BRIEF HOSPITAL COURSE: ***    Events last 24 hours: ***    PAST MEDICAL & SURGICAL HISTORY:  Hypertension    No significant past surgical history        Review of Systems:  CONSTITUTIONAL: No fever, chills, or fatigue  EYES: No eye pain, visual disturbances, or discharge  ENMT:  No difficulty hearing, tinnitus, vertigo; No sinus or throat pain  NECK: No pain or stiffness  RESPIRATORY: No cough, wheezing, chills or hemoptysis; No shortness of breath  CARDIOVASCULAR: No chest pain, palpitations, dizziness, or leg swelling  GASTROINTESTINAL: No abdominal or epigastric pain. No nausea, vomiting, or hematemesis; No diarrhea or constipation. No melena or hematochezia.  GENITOURINARY: No dysuria, frequency, hematuria, or incontinence  NEUROLOGICAL: No headaches, memory loss, loss of strength, numbness, or tremors  SKIN: No itching, burning, rashes, or lesions   MUSCULOSKELETAL: No joint pain or swelling; No muscle, back, or extremity pain  PSYCHIATRIC: No depression, anxiety, mood swings, or difficulty sleeping      Medications:  remdesivir  IVPB   IV Intermittent     lisinopril 20 milliGRAM(s) Oral daily      dexMEDEtomidine Infusion 0.3 MICROgram(s)/kG/Hr IV Continuous <Continuous>  morphine  - Injectable 2 milliGRAM(s) IV Push every 4 hours PRN      enoxaparin Injectable 40 milliGRAM(s) SubCutaneous two times a day    famotidine Injectable 20 milliGRAM(s) IV Push two times a day      dexAMETHasone  Injectable 6 milliGRAM(s) IV Push daily  dextrose 40% Gel 15 Gram(s) Oral once  dextrose 50% Injectable 25 Gram(s) IV Push once  dextrose 50% Injectable 12.5 Gram(s) IV Push once  dextrose 50% Injectable 25 Gram(s) IV Push once  glucagon  Injectable 1 milliGRAM(s) IntraMuscular once  insulin lispro (ADMELOG) corrective regimen sliding scale   SubCutaneous every 6 hours    dextrose 5%. 1000 milliLiter(s) IV Continuous <Continuous>  dextrose 5%. 1000 milliLiter(s) IV Continuous <Continuous>                ICU Vital Signs Last 24 Hrs  T(C): 36.7 (28 Feb 2021 15:52), Max: 37.8 (27 Feb 2021 22:09)  T(F): 98 (28 Feb 2021 15:52), Max: 100 (27 Feb 2021 22:09)  HR: 74 (28 Feb 2021 14:00) (64 - 96)  BP: 136/93 (28 Feb 2021 14:00) (135/79 - 172/96)  BP(mean): 101 (28 Feb 2021 14:00) (77 - 113)  ABP: --  ABP(mean): --  RR: 31 (28 Feb 2021 14:00) (28 - 60)  SpO2: 96% (28 Feb 2021 14:00) (73% - 100%)      ABG - ( 27 Feb 2021 23:59 )  pH, Arterial: 7.46  pH, Blood: x     /  pCO2: 39    /  pO2: 88    / HCO3: 27    / Base Excess: 3.4   /  SaO2: 97                        LABS:                        14.5   13.88 )-----------( 260      ( 27 Feb 2021 22:13 )             44.2     02-28    x   |  x   |  x   ----------------------------<  x   x    |  x   |  0.89    Ca    8.6      27 Feb 2021 22:13    TPro  7.4  /  Alb  3.1<L>  /  TBili  0.3<L>  /  DBili  0.1  /  AST  50<H>  /  ALT  48<H>  /  AlkPhos  86  02-28      CARDIAC MARKERS ( 27 Feb 2021 22:13 )  x     / <0.01 ng/mL / x     / x     / x          CAPILLARY BLOOD GLUCOSE      POCT Blood Glucose.: 140 mg/dL (28 Feb 2021 12:48)    PT/INR - ( 28 Feb 2021 06:07 )   PT: 13.7 sec;   INR: 1.19 ratio         PTT - ( 27 Feb 2021 22:13 )  PTT:29.8 sec    CULTURES:      Physical Examination:    General: mod respiratory distress.  Alert, oriented, interactive, nonfocal,  used for exam    HEENT: Pupils equal, reactive to light.  Symmetric.    PULM: diminished at base bilaterally, no significant sputum production    CVS: Regular rate and rhythm, no murmurs, rubs, or gallops    ABD: Soft, nondistended, nontender, normoactive bowel sounds, no masses    EXT: No edema, nontender    SKIN: Warm and well perfused, no rashes noted.    RADIOLOGY: images reviewed    CRITICAL CARE TIME SPENT: Critical Care time: 45 mins assessing presenting problems of acute illness that poses high probability of life threatening deterioration or end organ damage/dysfunction.  Medical decision making inculding Initiating plan of care, reviewing data, reviewing radiology,direct patient bedside evaluation and interpretation of vital signs, any necessary ventilator management , discusion with multidisciplinary team, discussing goals of care with patient/family, all non inclusive of procedures

## 2021-02-28 NOTE — PATIENT PROFILE ADULT - LANGUAGE ASSISTANCE NEEDED
the unit nurse can speak French/No-Patient/Caregiver offered and refused free interpretation services. the unit nurse can speak [place]/Yes-Patient/Caregiver accepts free interpretation services...

## 2021-02-28 NOTE — CONSULT NOTE ADULT - SUBJECTIVE AND OBJECTIVE BOX
[facility]  INFECTIOUS DISEASES AND INTERNAL MEDICINE at [facility]  =======================================================  Domingo Monsivais MD  Diplomates American Board of Internal Medicine and Infectious Diseases  Tel: [phone]      Fax: [phone]  =======================================================      [id number]  BECKY GUERRA    History obtained from he chart. Patient on BIPAP and unable to provide history     CC: Patient is a 55y old  Male who presents with a chief complaint of covid hypoxia (27 Feb 2021 23:51)      55y  Male with h/o HTN. Patient presented with shortness of breath. His symptoms began 2/22 with cough, subjective fevers, myalgias, nausea, and SOB. He went to urgent care and was found to be covid + on 2/22. Since his symptoms have gotten worse. He called EMS jose alfredo worsening SOB. EMS found him with sats of 50s on room air, brought him to the ED and was placed on BIPAP for increase work of breathing. O2 Sats improved but RR did not. Patint was admitted to MICU for COVID 19 PNA and respiratory failure. ID input requested.       Past Medical & Surgical Hx:  Hypertension  No significant past surgical history      Social Hx:  Denies smoking, ETOH     FAMILY HISTORY:      Allergies  No Known Allergies       REVIEW OF SYSTEMS:  unable to obtain due to patient's respiratory status       Physical Exam:  GEN: mild respiratory distress, on BIPAP   HEENT: normocephalic and atraumatic. EOMI. PERRL.  Anicteric  NECK: Supple.   LUNGS: Coarse BS B/L  HEART: Regular rate and rhythm   ABDOMEN: Soft, nontender, and nondistended.  Positive bowel sounds.    : No CVA tenderness  EXTREMITIES: Without any edema.  MSK: No joint swelling  NEUROLOGIC:  No Focal Deficits  PSYCHIATRIC: unable to assess   SKIN: No Rash      Vitals:  T(F): 98.3 (28 Feb 2021 11:32), Max: 100 (27 Feb 2021 22:09)  HR: 73 (28 Feb 2021 09:00)  BP: 136/85 (28 Feb 2021 09:00)  RR: 35 (28 Feb 2021 09:00)  SpO2: 97% (28 Feb 2021 09:00) (73% - 100%)  temp max in last 48H T(F): , Max: 100 (02-27-21 @ 22:09)      Current Antibiotics:  remdesivir  IVPB   IV Intermittent     Other medications:  dexAMETHasone  Injectable 6 milliGRAM(s) IV Push daily  dexMEDEtomidine Infusion 0.3 MICROgram(s)/kG/Hr IV Continuous <Continuous>  dextrose 40% Gel 15 Gram(s) Oral once  dextrose 5%. 1000 milliLiter(s) IV Continuous <Continuous>  dextrose 5%. 1000 milliLiter(s) IV Continuous <Continuous>  dextrose 50% Injectable 25 Gram(s) IV Push once  dextrose 50% Injectable 12.5 Gram(s) IV Push once  dextrose 50% Injectable 25 Gram(s) IV Push once  enoxaparin Injectable 40 milliGRAM(s) SubCutaneous two times a day  famotidine Injectable 20 milliGRAM(s) IV Push two times a day  glucagon  Injectable 1 milliGRAM(s) IntraMuscular once  insulin lispro (ADMELOG) corrective regimen sliding scale   SubCutaneous every 6 hours                 14.5   13.88 )-----------( 260      ( 27 Feb 2021 22:13 )             44.2     02-28    x   |  x   |  x   ----------------------------<  x   x    |  x   |  0.89    Ca    8.6      27 Feb 2021 22:13    TPro  7.4  /  Alb  3.1<L>  /  TBili  0.3<L>  /  DBili  0.1  /  AST  50<H>  /  ALT  48<H>  /  AlkPhos  86  02-28      WBC Count: 13.88 K/uL (02-27-21 @ 22:13)    Creatinine, Serum: 0.89 mg/dL (02-28-21 @ 06:07)  Creatinine, Serum: 1.13 mg/dL (02-27-21 @ 22:13)    C-Reactive Protein, Serum: 16.87 mg/dL (02-27-21 @ 22:13)    Ferritin, Serum: 659 ng/mL (02-27-21 @ 22:13)    Procalcitonin, Serum: 0.25 ng/mL (02-27-21 @ 22:13)     COVID-19 PCR: Detected (02-27-21 @ 22:15)

## 2021-02-28 NOTE — PROGRESS NOTE ADULT - PROBLEM SELECTOR PLAN 2
NIPPV bipap alternating with high flow NC  Wean fi02 as tolerated to scout 02 sat>92%  Precedex gtt if needed for anxiety due to respiratory distress  High risk for requiring intubation/ventilation

## 2021-03-01 LAB
ALBUMIN SERPL ELPH-MCNC: 2.8 G/DL — LOW (ref 3.3–5.2)
ALP SERPL-CCNC: 83 U/L — SIGNIFICANT CHANGE UP (ref 40–120)
ALT FLD-CCNC: 37 U/L — SIGNIFICANT CHANGE UP
ANION GAP SERPL CALC-SCNC: 14 MMOL/L — SIGNIFICANT CHANGE UP (ref 5–17)
AST SERPL-CCNC: 34 U/L — SIGNIFICANT CHANGE UP
BILIRUB DIRECT SERPL-MCNC: 0.1 MG/DL — SIGNIFICANT CHANGE UP (ref 0–0.3)
BILIRUB INDIRECT FLD-MCNC: 0.2 MG/DL — SIGNIFICANT CHANGE UP (ref 0.2–1)
BILIRUB SERPL-MCNC: 0.2 MG/DL — LOW (ref 0.4–2)
BUN SERPL-MCNC: 28 MG/DL — HIGH (ref 8–20)
CALCIUM SERPL-MCNC: 8.7 MG/DL — SIGNIFICANT CHANGE UP (ref 8.6–10.2)
CHLORIDE SERPL-SCNC: 101 MMOL/L — SIGNIFICANT CHANGE UP (ref 98–107)
CO2 SERPL-SCNC: 24 MMOL/L — SIGNIFICANT CHANGE UP (ref 22–29)
CREAT SERPL-MCNC: 1.02 MG/DL — SIGNIFICANT CHANGE UP (ref 0.5–1.3)
GLUCOSE BLDC GLUCOMTR-MCNC: 126 MG/DL — HIGH (ref 70–99)
GLUCOSE BLDC GLUCOMTR-MCNC: 128 MG/DL — HIGH (ref 70–99)
GLUCOSE SERPL-MCNC: 135 MG/DL — HIGH (ref 70–99)
HCT VFR BLD CALC: 39.9 % — SIGNIFICANT CHANGE UP (ref 39–50)
HGB BLD-MCNC: 13.2 G/DL — SIGNIFICANT CHANGE UP (ref 13–17)
INR BLD: 1.24 RATIO — HIGH (ref 0.88–1.16)
MAGNESIUM SERPL-MCNC: 2.1 MG/DL — SIGNIFICANT CHANGE UP (ref 1.6–2.6)
MCHC RBC-ENTMCNC: 30.1 PG — SIGNIFICANT CHANGE UP (ref 27–34)
MCHC RBC-ENTMCNC: 33.1 GM/DL — SIGNIFICANT CHANGE UP (ref 32–36)
MCV RBC AUTO: 91.1 FL — SIGNIFICANT CHANGE UP (ref 80–100)
PHOSPHATE SERPL-MCNC: 3 MG/DL — SIGNIFICANT CHANGE UP (ref 2.4–4.7)
PLATELET # BLD AUTO: 333 K/UL — SIGNIFICANT CHANGE UP (ref 150–400)
POTASSIUM SERPL-MCNC: 4.2 MMOL/L — SIGNIFICANT CHANGE UP (ref 3.5–5.3)
POTASSIUM SERPL-SCNC: 4.2 MMOL/L — SIGNIFICANT CHANGE UP (ref 3.5–5.3)
PROT SERPL-MCNC: 6.8 G/DL — SIGNIFICANT CHANGE UP (ref 6.6–8.7)
PROTHROM AB SERPL-ACNC: 14.2 SEC — HIGH (ref 10.6–13.6)
RBC # BLD: 4.38 M/UL — SIGNIFICANT CHANGE UP (ref 4.2–5.8)
RBC # FLD: 13.3 % — SIGNIFICANT CHANGE UP (ref 10.3–14.5)
SODIUM SERPL-SCNC: 139 MMOL/L — SIGNIFICANT CHANGE UP (ref 135–145)
WBC # BLD: 16.35 K/UL — HIGH (ref 3.8–10.5)
WBC # FLD AUTO: 16.35 K/UL — HIGH (ref 3.8–10.5)

## 2021-03-01 PROCEDURE — 99232 SBSQ HOSP IP/OBS MODERATE 35: CPT

## 2021-03-01 RX ORDER — ACETAMINOPHEN 500 MG
1000 TABLET ORAL ONCE
Refills: 0 | Status: COMPLETED | OUTPATIENT
Start: 2021-03-01 | End: 2021-03-01

## 2021-03-01 RX ADMIN — Medication 6 MILLIGRAM(S): at 05:09

## 2021-03-01 RX ADMIN — DEXMEDETOMIDINE HYDROCHLORIDE IN 0.9% SODIUM CHLORIDE 7.5 MICROGRAM(S)/KG/HR: 4 INJECTION INTRAVENOUS at 00:14

## 2021-03-01 RX ADMIN — REMDESIVIR 500 MILLIGRAM(S): 5 INJECTION INTRAVENOUS at 03:29

## 2021-03-01 RX ADMIN — LISINOPRIL 20 MILLIGRAM(S): 2.5 TABLET ORAL at 05:09

## 2021-03-01 RX ADMIN — FAMOTIDINE 20 MILLIGRAM(S): 10 INJECTION INTRAVENOUS at 17:32

## 2021-03-01 RX ADMIN — MORPHINE SULFATE 2 MILLIGRAM(S): 50 CAPSULE, EXTENDED RELEASE ORAL at 20:03

## 2021-03-01 RX ADMIN — FAMOTIDINE 20 MILLIGRAM(S): 10 INJECTION INTRAVENOUS at 05:08

## 2021-03-01 RX ADMIN — ENOXAPARIN SODIUM 40 MILLIGRAM(S): 100 INJECTION SUBCUTANEOUS at 17:32

## 2021-03-01 RX ADMIN — MORPHINE SULFATE 2 MILLIGRAM(S): 50 CAPSULE, EXTENDED RELEASE ORAL at 05:21

## 2021-03-01 RX ADMIN — Medication 400 MILLIGRAM(S): at 00:35

## 2021-03-01 RX ADMIN — ENOXAPARIN SODIUM 40 MILLIGRAM(S): 100 INJECTION SUBCUTANEOUS at 05:09

## 2021-03-01 NOTE — PROGRESS NOTE ADULT - SUBJECTIVE AND OBJECTIVE BOX
Patient is a 55 year old male with a pmhx of HTN who presents with shortness of breath. Patient explains his symptoms began 2/22 with cough, kervin, myalgias, nausea, and SOB. Then went to urgent care and was found to be covid + on 2/22. Since then symptoms have gotten worse. Then came to Ed for severe shortness of breath. EMS found him with sats of 50s on room air. Then upon arrival he was placed on bipap for increase work of breathing. Sats improved but RR did not.    Pt is tolerating HiFlow NC today at 80% at 50L.  He feels

## 2021-03-01 NOTE — PROGRESS NOTE ADULT - ASSESSMENT
55y  Male with h/o HTN. Patient presented with shortness of breath. His symptoms began 2/22 with cough, subjective fevers, myalgias, nausea, and SOB. He went to urgent care and was found to be covid + on 2/22. Since his symptoms have gotten worse. He called EMS jose alfredo worsening SOB. EMS found him with sats of 50s on room air, brought him to the ED and was placed on BIPAP for increase work of breathing. O2 Sats improved but RR did not. Patint was admitted to MICU for COVID 19 PNA and respiratory failure.      Acute Hypoxic respiratory failure  COVID-19 infection  B/L Pneumonia   cough  shortness of breath      - COVID 19 PCR positive   - Continue supportive care measures  - continue to trend inflammatory markers.   - trend CBC with diff, CMP,  CRP, Ferritin,  procalcitonin q 48hours  - Avoid antibiotics unless there is a concern for a bacterial infection  - May consider vitamin C, thiamine and zinc (note lack of evidence to support benefit with COVID 19)  - Continue Remdesivir 200mg IV x1 followed by 100mg IV daily   - Dexamethasone 6mg Daily   - follow up all outstanding cultures  - Trend Fever  - Trend Leukocytosis    Will follow

## 2021-03-01 NOTE — DIETITIAN INITIAL EVALUATION ADULT. - OTHER INFO
Patient is a 55 year old male with a pmhx of HTN who presents with shortness of breath. Patient explains his symptoms began 2/22 with cough, fevers, myalgias, nausea, and SOB. Then went to urgent care and was found to be covid + on 2/22. Since then symptoms have gotten worse. Then came to Ed for severe shortness of breath. EMS found him with sats of 50s on room air. Then upon arrival he was placed on BIPAP for increase work of breathing. Sats improved but RR did not.. MICU consult placed. Pt seen and examined at bedside with Ed  and at the moment patient is without medical complaints. He explains that he feels "fine"  Pt admitted with acute hypoxemic respiratory failure due to COVID-19 PNA. Pt remained on BIPAP overnight, NPO status maintained while on BIPAP. Pt eating well when off BIPAP.

## 2021-03-01 NOTE — PROGRESS NOTE ADULT - SUBJECTIVE AND OBJECTIVE BOX
[facility]  INFECTIOUS DISEASES AND INTERNAL MEDICINE at [place]  =======================================================  Domingo Monsivais MD  Diplomates American Board of Internal Medicine and Infectious Diseases  Tel: [phone]      Fax: [phone]  =======================================================    BECKY GUERRA [id number]    Follow up: COVID 19    Now on HFNC    Feels better       Allergies:  No Known Allergies      REVIEW OF SYSTEMS:  CONSTITUTIONAL:  No Fever or chills  HEENT:   No diplopia or blurred vision.  No earache, sore throat or runny nose.  CARDIOVASCULAR:  No pressure, squeezing, strangling, tightness, heaviness or aching about the chest, neck, axilla or epigastrium.  RESPIRATORY:  + cough, + shortness of breath  GASTROINTESTINAL:  No nausea, vomiting or diarrhea.  GENITOURINARY:  No dysuria, frequency or urgency.  MUSCULOSKELETAL:  no joint aches, no muscle pain  SKIN:  No change in skin, hair or nails.  NEUROLOGIC:  No Headaches, seizures   PSYCHIATRIC:  No disorder of thought or mood.  ENDOCRINE:  No heat or cold intolerance  HEMATOLOGICAL:  No easy bruising or bleeding.       Physical Exam:  GEN: NAD  HEENT: normocephalic and atraumatic. EOMI. PERRL.  Anicteric   NECK: Supple.   LUNGS: Coarse BS B/L  HEART: Regular rate and rhythm  ABDOMEN: Soft, nontender, and nondistended.  Positive bowel sounds.    : No CVA tenderness  EXTREMITIES: Without any edema.  MSK: no joint swelling  NEUROLOGIC: No focal deficits  PSYCHIATRIC: Appropriate affect .  SKIN: No Rash      Vitals:  T(F): 98.4 (01 Mar 2021 12:55), Max: 98.9 (01 Mar 2021 12:00)  HR: 66 (01 Mar 2021 12:00)  BP: 111/66 (01 Mar 2021 12:00)  RR: 34 (01 Mar 2021 13:00)  SpO2: 95% (01 Mar 2021 13:00) (91% - 98%)  temp max in last 48H T(F): , Max: 100 (02-27-21 @ 22:09)      Current Antibiotics:  remdesivir  IVPB 100 milliGRAM(s) IV Intermittent every 24 hours  remdesivir  IVPB   IV Intermittent     Other medications:  dexAMETHasone  Injectable 6 milliGRAM(s) IV Push daily  dextrose 40% Gel 15 Gram(s) Oral once  dextrose 5%. 1000 milliLiter(s) IV Continuous <Continuous>  dextrose 5%. 1000 milliLiter(s) IV Continuous <Continuous>  dextrose 50% Injectable 25 Gram(s) IV Push once  dextrose 50% Injectable 12.5 Gram(s) IV Push once  dextrose 50% Injectable 25 Gram(s) IV Push once  enoxaparin Injectable 40 milliGRAM(s) SubCutaneous two times a day  famotidine Injectable 20 milliGRAM(s) IV Push two times a day  glucagon  Injectable 1 milliGRAM(s) IntraMuscular once  insulin lispro (ADMELOG) corrective regimen sliding scale   SubCutaneous every 6 hours  lisinopril 20 milliGRAM(s) Oral daily                            13.2   16.35 )-----------( 333      ( 01 Mar 2021 04:20 )             39.9     03-01    139  |  101  |  28.0<H>  ----------------------------<  135<H>  4.2   |  24.0  |  1.02    Ca    8.7      01 Mar 2021 04:20  Phos  3.0     03-01  Mg     2.1     03-01    TPro  6.8  /  Alb  2.8<L>  /  TBili  0.2<L>  /  DBili  0.1  /  AST  34  /  ALT  37  /  AlkPhos  83  03-01    RECENT CULTURES:      WBC Count: 16.35 K/uL (03-01-21 @ 04:20)  WBC Count: 13.88 K/uL (02-27-21 @ 22:13)    Creatinine, Serum: 1.02 mg/dL (03-01-21 @ 04:20)  Creatinine, Serum: 0.89 mg/dL (02-28-21 @ 06:07)  Creatinine, Serum: 1.13 mg/dL (02-27-21 @ 22:13)    C-Reactive Protein, Serum: 16.87 mg/dL (02-27-21 @ 22:13)    Ferritin, Serum: 659 ng/mL (02-27-21 @ 22:13)    Procalcitonin, Serum: 0.25 ng/mL (02-27-21 @ 22:13)     COVID-19 IgG Antibody Interpretation: Negative (02-28-21 @ 12:37)  COVID-19 IgG Antibody Index: 0.14 Index (02-28-21 @ 12:37)  COVID-19 PCR: Detected (02-27-21 @ 22:15)        < from: US Duplex Venous Lower Ext Complete, Bilateral (02.28.21 @ 19:07) >  EXAM:  US DPLX LWR EXT VEINS COMPL BI                          PROCEDURE DATE:  02/28/2021      INTERPRETATION:  CLINICAL INFORMATION: Lower extremity swelling. Covid 19.    COMPARISON: None available.    TECHNIQUE: Duplex sonography of the BILATERAL LOWER extremity veins with color and spectral Doppler, with and without compression.    FINDINGS:    There is normal compressibility of the bilateral common femoral, femoral and popliteal veins.  Doppler examination shows normal spontaneous and phasic flow.    No calf vein thrombosis is detected.    IMPRESSION:  No evidence of deep venous thrombosis in either lower extremity.    < end of copied text >

## 2021-03-02 LAB
ALBUMIN SERPL ELPH-MCNC: 3 G/DL — LOW (ref 3.3–5.2)
ALBUMIN SERPL ELPH-MCNC: 3 G/DL — LOW (ref 3.3–5.2)
ALP SERPL-CCNC: 86 U/L — SIGNIFICANT CHANGE UP (ref 40–120)
ALP SERPL-CCNC: 89 U/L — SIGNIFICANT CHANGE UP (ref 40–120)
ALT FLD-CCNC: 33 U/L — SIGNIFICANT CHANGE UP
ALT FLD-CCNC: 33 U/L — SIGNIFICANT CHANGE UP
ANION GAP SERPL CALC-SCNC: 15 MMOL/L — SIGNIFICANT CHANGE UP (ref 5–17)
AST SERPL-CCNC: 29 U/L — SIGNIFICANT CHANGE UP
AST SERPL-CCNC: 30 U/L — SIGNIFICANT CHANGE UP
BILIRUB DIRECT SERPL-MCNC: 0.1 MG/DL — SIGNIFICANT CHANGE UP (ref 0–0.3)
BILIRUB INDIRECT FLD-MCNC: 0.2 MG/DL — SIGNIFICANT CHANGE UP (ref 0.2–1)
BILIRUB SERPL-MCNC: 0.3 MG/DL — LOW (ref 0.4–2)
BILIRUB SERPL-MCNC: 0.3 MG/DL — LOW (ref 0.4–2)
BUN SERPL-MCNC: 29 MG/DL — HIGH (ref 8–20)
CALCIUM SERPL-MCNC: 8.6 MG/DL — SIGNIFICANT CHANGE UP (ref 8.6–10.2)
CHLORIDE SERPL-SCNC: 98 MMOL/L — SIGNIFICANT CHANGE UP (ref 98–107)
CO2 SERPL-SCNC: 22 MMOL/L — SIGNIFICANT CHANGE UP (ref 22–29)
CREAT SERPL-MCNC: 0.77 MG/DL — SIGNIFICANT CHANGE UP (ref 0.5–1.3)
CREAT SERPL-MCNC: 0.88 MG/DL — SIGNIFICANT CHANGE UP (ref 0.5–1.3)
CRP SERPL-MCNC: 30 MG/L — HIGH (ref 0–4)
D DIMER BLD IA.RAPID-MCNC: 460 NG/ML DDU — HIGH
GLUCOSE BLDC GLUCOMTR-MCNC: 101 MG/DL — HIGH (ref 70–99)
GLUCOSE BLDC GLUCOMTR-MCNC: 115 MG/DL — HIGH (ref 70–99)
GLUCOSE BLDC GLUCOMTR-MCNC: 118 MG/DL — HIGH (ref 70–99)
GLUCOSE BLDC GLUCOMTR-MCNC: 125 MG/DL — HIGH (ref 70–99)
GLUCOSE BLDC GLUCOMTR-MCNC: 142 MG/DL — HIGH (ref 70–99)
GLUCOSE SERPL-MCNC: 191 MG/DL — HIGH (ref 70–99)
HCT VFR BLD CALC: 41.2 % — SIGNIFICANT CHANGE UP (ref 39–50)
HGB BLD-MCNC: 13.7 G/DL — SIGNIFICANT CHANGE UP (ref 13–17)
INR BLD: 1.21 RATIO — HIGH (ref 0.88–1.16)
MAGNESIUM SERPL-MCNC: 2.1 MG/DL — SIGNIFICANT CHANGE UP (ref 1.6–2.6)
MCHC RBC-ENTMCNC: 30.3 PG — SIGNIFICANT CHANGE UP (ref 27–34)
MCHC RBC-ENTMCNC: 33.3 GM/DL — SIGNIFICANT CHANGE UP (ref 32–36)
MCV RBC AUTO: 91.2 FL — SIGNIFICANT CHANGE UP (ref 80–100)
PLATELET # BLD AUTO: 396 K/UL — SIGNIFICANT CHANGE UP (ref 150–400)
POTASSIUM SERPL-MCNC: 4.5 MMOL/L — SIGNIFICANT CHANGE UP (ref 3.5–5.3)
POTASSIUM SERPL-SCNC: 4.5 MMOL/L — SIGNIFICANT CHANGE UP (ref 3.5–5.3)
PROT SERPL-MCNC: 6.8 G/DL — SIGNIFICANT CHANGE UP (ref 6.6–8.7)
PROT SERPL-MCNC: 7 G/DL — SIGNIFICANT CHANGE UP (ref 6.6–8.7)
PROTHROM AB SERPL-ACNC: 13.9 SEC — HIGH (ref 10.6–13.6)
RBC # BLD: 4.52 M/UL — SIGNIFICANT CHANGE UP (ref 4.2–5.8)
RBC # FLD: 13.4 % — SIGNIFICANT CHANGE UP (ref 10.3–14.5)
SODIUM SERPL-SCNC: 135 MMOL/L — SIGNIFICANT CHANGE UP (ref 135–145)
WBC # BLD: 17.1 K/UL — HIGH (ref 3.8–10.5)
WBC # FLD AUTO: 17.1 K/UL — HIGH (ref 3.8–10.5)

## 2021-03-02 PROCEDURE — 99233 SBSQ HOSP IP/OBS HIGH 50: CPT

## 2021-03-02 RX ORDER — ACETAMINOPHEN 500 MG
650 TABLET ORAL EVERY 6 HOURS
Refills: 0 | Status: DISCONTINUED | OUTPATIENT
Start: 2021-03-02 | End: 2021-03-28

## 2021-03-02 RX ORDER — INSULIN LISPRO 100/ML
VIAL (ML) SUBCUTANEOUS
Refills: 0 | Status: DISCONTINUED | OUTPATIENT
Start: 2021-03-02 | End: 2021-03-28

## 2021-03-02 RX ORDER — INSULIN LISPRO 100/ML
VIAL (ML) SUBCUTANEOUS AT BEDTIME
Refills: 0 | Status: DISCONTINUED | OUTPATIENT
Start: 2021-03-02 | End: 2021-03-28

## 2021-03-02 RX ADMIN — ENOXAPARIN SODIUM 40 MILLIGRAM(S): 100 INJECTION SUBCUTANEOUS at 17:20

## 2021-03-02 RX ADMIN — FAMOTIDINE 20 MILLIGRAM(S): 10 INJECTION INTRAVENOUS at 17:20

## 2021-03-02 RX ADMIN — ENOXAPARIN SODIUM 40 MILLIGRAM(S): 100 INJECTION SUBCUTANEOUS at 05:29

## 2021-03-02 RX ADMIN — MORPHINE SULFATE 2 MILLIGRAM(S): 50 CAPSULE, EXTENDED RELEASE ORAL at 04:22

## 2021-03-02 RX ADMIN — Medication 6 MILLIGRAM(S): at 05:29

## 2021-03-02 RX ADMIN — Medication 650 MILLIGRAM(S): at 16:26

## 2021-03-02 RX ADMIN — FAMOTIDINE 20 MILLIGRAM(S): 10 INJECTION INTRAVENOUS at 05:30

## 2021-03-02 RX ADMIN — Medication 650 MILLIGRAM(S): at 09:30

## 2021-03-02 RX ADMIN — REMDESIVIR 500 MILLIGRAM(S): 5 INJECTION INTRAVENOUS at 04:22

## 2021-03-02 RX ADMIN — LISINOPRIL 20 MILLIGRAM(S): 2.5 TABLET ORAL at 05:30

## 2021-03-02 NOTE — PROGRESS NOTE ADULT - SUBJECTIVE AND OBJECTIVE BOX
[facility] Division of Hospital Medicine    Chief Complaint:  COVID    SUBJECTIVE / OVERNIGHT EVENTS: patient seen and examined this morning. on hifo at 75% fio2. Denies shortness of breath. Reports minimal cough with white sputum at times. No chest pain.     Patient denies chest pain, SOB, abd pain, N/V, fever, chills, dysuria or any other complaints. All remainder ROS negative.     MEDICATIONS  (STANDING):  dexAMETHasone  Injectable 6 milliGRAM(s) IV Push daily  dextrose 40% Gel 15 Gram(s) Oral once  dextrose 5%. 1000 milliLiter(s) (50 mL/Hr) IV Continuous <Continuous>  dextrose 5%. 1000 milliLiter(s) (100 mL/Hr) IV Continuous <Continuous>  dextrose 50% Injectable 25 Gram(s) IV Push once  dextrose 50% Injectable 12.5 Gram(s) IV Push once  dextrose 50% Injectable 25 Gram(s) IV Push once  enoxaparin Injectable 40 milliGRAM(s) SubCutaneous two times a day  famotidine Injectable 20 milliGRAM(s) IV Push two times a day  glucagon  Injectable 1 milliGRAM(s) IntraMuscular once  insulin lispro (ADMELOG) corrective regimen sliding scale   SubCutaneous three times a day before meals  insulin lispro (ADMELOG) corrective regimen sliding scale   SubCutaneous at bedtime  lisinopril 20 milliGRAM(s) Oral daily  remdesivir  IVPB 100 milliGRAM(s) IV Intermittent every 24 hours  remdesivir  IVPB   IV Intermittent     MEDICATIONS  (PRN):  acetaminophen   Tablet .. 650 milliGRAM(s) Oral every 6 hours PRN Mild Pain (1 - 3), Moderate Pain (4 - 6)  morphine  - Injectable 2 milliGRAM(s) IV Push every 4 hours PRN tachypnea        I&O's Summary    01 Mar 2021 07:01  -  02 Mar 2021 07:00  --------------------------------------------------------  IN: 650 mL / OUT: 1150 mL / NET: -500 mL    02 Mar 2021 07:01  -  02 Mar 2021 11:43  --------------------------------------------------------  IN: 0 mL / OUT: 350 mL / NET: -350 mL        PHYSICAL EXAM:  Vital Signs Last 24 Hrs  T(C): 37.4 (02 Mar 2021 08:23), Max: 37.4 (02 Mar 2021 08:23)  T(F): 99.4 (02 Mar 2021 08:23), Max: 99.4 (02 Mar 2021 08:23)  HR: 61 (02 Mar 2021 08:23) (56 - 68)  BP: 122/74 (02 Mar 2021 08:23) (109/65 - 122/74)  BP(mean): 85 (02 Mar 2021 04:53) (76 - 85)  RR: 20 (02 Mar 2021 08:23) (20 - 34)  SpO2: 98% (02 Mar 2021 08:23) (91% - 98%)      CONSTITUTIONAL: NAD, well-developed, well-groomed  ENMT: Moist oral mucosa, no pharyngeal injection or exudates  RESPIRATORY: Normal respiratory effort; lungs are clear to auscultation bilaterally  CARDIOVASCULAR: Regular rate and rhythm, normal S1 and S2, no murmur/rub/gallop; No lower extremity nighat  ABDOMEN: Nontender to palpation, normoactive bowel sounds, no rebound/guarding; No hepatosplenomegaly  MUSCLOSKELETAL:  no clubbing or cyanosis of digits; no joint swelling or tenderness to palpation  PSYCH: A+O to person, place, and time; affect appropriate  NEUROLOGY: CN 2-12 are intact and symmetric; no gross sensory deficits;   SKIN: No rashes; no palpable lesions    LABS:                        13.7   17.10 )-----------( 396      ( 02 Mar 2021 10:56 )             41.2     03-02    135  |  98  |  29.0<H>  ----------------------------<  191<H>  4.5   |  22.0  |  0.88    Ca    8.6      02 Mar 2021 10:56  Phos  3.0     03-01  Mg     2.1     03-02    TPro  7.0  /  Alb  3.0<L>  /  TBili  0.3<L>  /  DBili  x   /  AST  30  /  ALT  33  /  AlkPhos  86  03-02    PT/INR - ( 02 Mar 2021 09:55 )   PT: 13.9 sec;   INR: 1.21 ratio                   CAPILLARY BLOOD GLUCOSE      POCT Blood Glucose.: 118 mg/dL (02 Mar 2021 06:07)  POCT Blood Glucose.: 101 mg/dL (01 Mar 2021 23:56)  POCT Blood Glucose.: 126 mg/dL (01 Mar 2021 17:16)        RADIOLOGY & ADDITIONAL TESTS:  Results Reviewed:   Imaging Personally Reviewed:  Electrocardiogram Personally Reviewed:

## 2021-03-02 NOTE — PROGRESS NOTE ADULT - ASSESSMENT
54 yo Khmer speaking male with PMHs of HTN, HLD and DM came with SOB. Admitted for hypoxic respiratory failure and placed on BIPAP in ICU. Weaned to high flow and  downgraded to step down unit.     Acute hypoxic respiratory failure secondary to covid PNA   - continue step down unit care for today. If stable can consider downgrade   - s/p BIPAP, currently on high flow, saturating 90-93%, taper as tolerated   - c/w REmdesivir and Dexamethasone   - Incentive spirometry, chest PT and Prone as tolerated  - trend inflam marker  - ID following    Leukocytosis  - likely steroid induced  - will monitor   - check procal in am    Elevated ddimer  460 today, no leg or calf pain  LE duplex neg on 2/28  - will trend if rising or if increasing oxygen requirements will consider duplex and additional AC.     HTN: BP controlled on Lisinopril 20 mg po daily     Pre DM: HBA1C 6. BG controlled on Lispro per SS     DVT prophylaxis: Lovenox per protocol

## 2021-03-03 LAB
ALBUMIN SERPL ELPH-MCNC: 2.7 G/DL — LOW (ref 3.3–5.2)
ALBUMIN SERPL ELPH-MCNC: 2.7 G/DL — LOW (ref 3.3–5.2)
ALP SERPL-CCNC: 87 U/L — SIGNIFICANT CHANGE UP (ref 40–120)
ALP SERPL-CCNC: 91 U/L — SIGNIFICANT CHANGE UP (ref 40–120)
ALT FLD-CCNC: 32 U/L — SIGNIFICANT CHANGE UP
ALT FLD-CCNC: 33 U/L — SIGNIFICANT CHANGE UP
ANION GAP SERPL CALC-SCNC: 12 MMOL/L — SIGNIFICANT CHANGE UP (ref 5–17)
AST SERPL-CCNC: 32 U/L — SIGNIFICANT CHANGE UP
AST SERPL-CCNC: 32 U/L — SIGNIFICANT CHANGE UP
BILIRUB DIRECT SERPL-MCNC: 0.1 MG/DL — SIGNIFICANT CHANGE UP (ref 0–0.3)
BILIRUB INDIRECT FLD-MCNC: 0.3 MG/DL — SIGNIFICANT CHANGE UP (ref 0.2–1)
BILIRUB SERPL-MCNC: 0.4 MG/DL — SIGNIFICANT CHANGE UP (ref 0.4–2)
BILIRUB SERPL-MCNC: 0.4 MG/DL — SIGNIFICANT CHANGE UP (ref 0.4–2)
BUN SERPL-MCNC: 22 MG/DL — HIGH (ref 8–20)
CALCIUM SERPL-MCNC: 8.3 MG/DL — LOW (ref 8.6–10.2)
CHLORIDE SERPL-SCNC: 99 MMOL/L — SIGNIFICANT CHANGE UP (ref 98–107)
CO2 SERPL-SCNC: 23 MMOL/L — SIGNIFICANT CHANGE UP (ref 22–29)
CREAT SERPL-MCNC: 0.82 MG/DL — SIGNIFICANT CHANGE UP (ref 0.5–1.3)
CRP SERPL-MCNC: 45 MG/L — HIGH
D DIMER BLD IA.RAPID-MCNC: 561 NG/ML DDU — HIGH
GLUCOSE BLDC GLUCOMTR-MCNC: 102 MG/DL — HIGH (ref 70–99)
GLUCOSE BLDC GLUCOMTR-MCNC: 112 MG/DL — HIGH (ref 70–99)
GLUCOSE BLDC GLUCOMTR-MCNC: 124 MG/DL — HIGH (ref 70–99)
GLUCOSE BLDC GLUCOMTR-MCNC: 159 MG/DL — HIGH (ref 70–99)
GLUCOSE SERPL-MCNC: 126 MG/DL — HIGH (ref 70–99)
HCT VFR BLD CALC: 41.6 % — SIGNIFICANT CHANGE UP (ref 39–50)
HGB BLD-MCNC: 13.6 G/DL — SIGNIFICANT CHANGE UP (ref 13–17)
INR BLD: 1.29 RATIO — HIGH (ref 0.88–1.16)
MAGNESIUM SERPL-MCNC: 1.9 MG/DL — SIGNIFICANT CHANGE UP (ref 1.6–2.6)
MCHC RBC-ENTMCNC: 29.5 PG — SIGNIFICANT CHANGE UP (ref 27–34)
MCHC RBC-ENTMCNC: 32.7 GM/DL — SIGNIFICANT CHANGE UP (ref 32–36)
MCV RBC AUTO: 90.2 FL — SIGNIFICANT CHANGE UP (ref 80–100)
PLATELET # BLD AUTO: 411 K/UL — HIGH (ref 150–400)
POTASSIUM SERPL-MCNC: 4 MMOL/L — SIGNIFICANT CHANGE UP (ref 3.5–5.3)
POTASSIUM SERPL-SCNC: 4 MMOL/L — SIGNIFICANT CHANGE UP (ref 3.5–5.3)
PROCALCITONIN SERPL-MCNC: 0.28 NG/ML — HIGH (ref 0.02–0.1)
PROT SERPL-MCNC: 6.6 G/DL — SIGNIFICANT CHANGE UP (ref 6.6–8.7)
PROT SERPL-MCNC: 6.6 G/DL — SIGNIFICANT CHANGE UP (ref 6.6–8.7)
PROTHROM AB SERPL-ACNC: 14.8 SEC — HIGH (ref 10.6–13.6)
RBC # BLD: 4.61 M/UL — SIGNIFICANT CHANGE UP (ref 4.2–5.8)
RBC # FLD: 13.4 % — SIGNIFICANT CHANGE UP (ref 10.3–14.5)
SODIUM SERPL-SCNC: 134 MMOL/L — LOW (ref 135–145)
WBC # BLD: 18.1 K/UL — HIGH (ref 3.8–10.5)
WBC # FLD AUTO: 18.1 K/UL — HIGH (ref 3.8–10.5)

## 2021-03-03 PROCEDURE — 99233 SBSQ HOSP IP/OBS HIGH 50: CPT

## 2021-03-03 PROCEDURE — 99232 SBSQ HOSP IP/OBS MODERATE 35: CPT

## 2021-03-03 RX ADMIN — LISINOPRIL 20 MILLIGRAM(S): 2.5 TABLET ORAL at 05:03

## 2021-03-03 RX ADMIN — Medication 650 MILLIGRAM(S): at 19:32

## 2021-03-03 RX ADMIN — Medication 6 MILLIGRAM(S): at 05:03

## 2021-03-03 RX ADMIN — Medication 1: at 11:21

## 2021-03-03 RX ADMIN — FAMOTIDINE 20 MILLIGRAM(S): 10 INJECTION INTRAVENOUS at 05:03

## 2021-03-03 RX ADMIN — ENOXAPARIN SODIUM 40 MILLIGRAM(S): 100 INJECTION SUBCUTANEOUS at 05:03

## 2021-03-03 RX ADMIN — Medication 650 MILLIGRAM(S): at 23:02

## 2021-03-03 RX ADMIN — Medication 650 MILLIGRAM(S): at 05:03

## 2021-03-03 RX ADMIN — FAMOTIDINE 20 MILLIGRAM(S): 10 INJECTION INTRAVENOUS at 17:22

## 2021-03-03 RX ADMIN — ENOXAPARIN SODIUM 40 MILLIGRAM(S): 100 INJECTION SUBCUTANEOUS at 17:23

## 2021-03-03 RX ADMIN — REMDESIVIR 500 MILLIGRAM(S): 5 INJECTION INTRAVENOUS at 03:39

## 2021-03-03 NOTE — PROGRESS NOTE ADULT - SUBJECTIVE AND OBJECTIVE BOX
[facility]  INFECTIOUS DISEASES AND INTERNAL MEDICINE at [place]  =======================================================  Domingo Monsivais MD  Diplomates American Board of Internal Medicine and Infectious Diseases  Tel: [phone]      Fax: [phone]  =======================================================    BECKY GUERRA [id number]    Follow up: COVID 19    Now on HFNC    Worsening O2 requirement      Allergies:  No Known Allergies      REVIEW OF SYSTEMS:  CONSTITUTIONAL:  No Fever or chills  HEENT:   No diplopia or blurred vision.  No earache, sore throat or runny nose.  CARDIOVASCULAR:  No pressure, squeezing, strangling, tightness, heaviness or aching about the chest, neck, axilla or epigastrium.  RESPIRATORY:  + cough, + shortness of breath  GASTROINTESTINAL:  No nausea, vomiting or diarrhea.  GENITOURINARY:  No dysuria, frequency or urgency.  MUSCULOSKELETAL:  no joint aches, no muscle pain  SKIN:  No change in skin, hair or nails.  NEUROLOGIC:  No Headaches, seizures   PSYCHIATRIC:  No disorder of thought or mood.  ENDOCRINE:  No heat or cold intolerance  HEMATOLOGICAL:  No easy bruising or bleeding.       Physical Exam:  GEN: NAD  HEENT: normocephalic and atraumatic. EOMI. PERRL.  Anicteric   NECK: Supple.   LUNGS: Coarse BS B/L  HEART: Regular rate and rhythm  ABDOMEN: Soft, nontender, and nondistended.  Positive bowel sounds.    : No CVA tenderness  EXTREMITIES: Without any edema.  MSK: no joint swelling  NEUROLOGIC: No focal deficits  PSYCHIATRIC: Appropriate affect .  SKIN: No Rash    Vitals:  T(F): 98.8 (03 Mar 2021 07:46), Max: 99 (03 Mar 2021 00:25)  HR: 73 (03 Mar 2021 09:06)  BP: 110/70 (03 Mar 2021 07:46)  RR: 18 (03 Mar 2021 07:46)  SpO2: 90% (03 Mar 2021 09:06) (90% - 96%)  temp max in last 48H T(F): , Max: 99.4 (03-02-21 @ 08:23)      Current Antibiotics:  remdesivir  IVPB 100 milliGRAM(s) IV Intermittent every 24 hours  remdesivir  IVPB   IV Intermittent     Other medications:  dexAMETHasone  Injectable 6 milliGRAM(s) IV Push daily  dextrose 40% Gel 15 Gram(s) Oral once  dextrose 5%. 1000 milliLiter(s) IV Continuous <Continuous>  dextrose 5%. 1000 milliLiter(s) IV Continuous <Continuous>  dextrose 50% Injectable 25 Gram(s) IV Push once  dextrose 50% Injectable 12.5 Gram(s) IV Push once  dextrose 50% Injectable 25 Gram(s) IV Push once  enoxaparin Injectable 40 milliGRAM(s) SubCutaneous two times a day  famotidine Injectable 20 milliGRAM(s) IV Push two times a day  glucagon  Injectable 1 milliGRAM(s) IntraMuscular once  insulin lispro (ADMELOG) corrective regimen sliding scale   SubCutaneous three times a day before meals  insulin lispro (ADMELOG) corrective regimen sliding scale   SubCutaneous at bedtime  lisinopril 20 milliGRAM(s) Oral daily                 13.6   18.10 )-----------( 411      ( 03 Mar 2021 06:48 )             41.6     03-03    134<L>  |  99  |  22.0<H>  ----------------------------<  126<H>  4.0   |  23.0  |  0.82    Ca    8.3<L>      03 Mar 2021 06:48  Mg     1.9     03-03    TPro  6.6  /  Alb  2.7<L>  /  TBili  0.4  /  DBili  0.1  /  AST  32  /  ALT  32  /  AlkPhos  87  03-03          WBC Count: 18.10 K/uL (03-03-21 @ 06:48)  WBC Count: 17.10 K/uL (03-02-21 @ 10:56)  WBC Count: 16.35 K/uL (03-01-21 @ 04:20)  WBC Count: 13.88 K/uL (02-27-21 @ 22:13)    Creatinine, Serum: 0.82 mg/dL (03-03-21 @ 06:48)  Creatinine, Serum: 0.88 mg/dL (03-02-21 @ 10:56)  Creatinine, Serum: 0.77 mg/dL (03-02-21 @ 09:55)  Creatinine, Serum: 1.02 mg/dL (03-01-21 @ 04:20)  Creatinine, Serum: 0.89 mg/dL (02-28-21 @ 06:07)  Creatinine, Serum: 1.13 mg/dL (02-27-21 @ 22:13)    C-Reactive Protein, Serum: 45 mg/L (03-03-21 @ 06:48)  C-Reactive Protein, Serum: 30 mg/L (03-02-21 @ 10:56)  C-Reactive Protein, Serum: 16.87 mg/dL (02-27-21 @ 22:13)    Ferritin, Serum: 659 ng/mL (02-27-21 @ 22:13)    Procalcitonin, Serum: 0.28 ng/mL (03-03-21 @ 06:48)  Procalcitonin, Serum: 0.25 ng/mL (02-27-21 @ 22:13)    COVID-19 IgG Antibody Interpretation: Negative (02-28-21 @ 12:37)  COVID-19 IgG Antibody Index: 0.14 Index (02-28-21 @ 12:37)  COVID-19 PCR: Detected (02-27-21 @ 22:15)          < from: US Duplex Venous Lower Ext Complete, Bilateral (02.28.21 @ 19:07) >  EXAM:  US DPLX LWR EXT VEINS COMPL BI                          PROCEDURE DATE:  02/28/2021      INTERPRETATION:  CLINICAL INFORMATION: Lower extremity swelling. Covid 19.    COMPARISON: None available.    TECHNIQUE: Duplex sonography of the BILATERAL LOWER extremity veins with color and spectral Doppler, with and without compression.    FINDINGS:    There is normal compressibility of the bilateral common femoral, femoral and popliteal veins.  Doppler examination shows normal spontaneous and phasic flow.    No calf vein thrombosis is detected.    IMPRESSION:  No evidence of deep venous thrombosis in either lower extremity.    < end of copied text >

## 2021-03-03 NOTE — PROGRESS NOTE ADULT - ASSESSMENT
55y  Male with h/o HTN. Patient presented with shortness of breath. His symptoms began 2/22 with cough, subjective fevers, myalgias, nausea, and SOB. He went to urgent care and was found to be covid + on 2/22. Since his symptoms have gotten worse. He called EMS jose alfredo worsening SOB. EMS found him with sats of 50s on room air, brought him to the ED and was placed on BIPAP for increase work of breathing. O2 Sats improved but RR did not. Patint was admitted to MICU for COVID 19 PNA and respiratory failure.      Acute Hypoxic respiratory failure  COVID-19 infection  B/L Pneumonia   cough  shortness of breath      - COVID 19 PCR positive   - Continue supportive care measures  - continue to trend inflammatory markers.   - trend CBC with diff, CMP,  CRP, Ferritin,  procalcitonin q 48hours  - Avoid antibiotics unless there is a concern for a bacterial infection  - May consider vitamin C, thiamine and zinc (note lack of evidence to support benefit with COVID 19)  - Continue Remdesivir 200mg IV x1 followed by 100mg IV daily   - If no improvement may consider Actemra in the next 24 hours  - Dexamethasone 6mg Daily   - follow up all outstanding cultures  - Trend Fever  - Trend Leukocytosis    Will follow

## 2021-03-03 NOTE — PROGRESS NOTE ADULT - ASSESSMENT
55y/oM PMH HTN, HLD, DM presenting with SOB, admitted with hypoxic respiratory failure to ICU, placed on Bipap. Weaned to HFNC and downgraded 3/1.     Acute hypoxic respiratory failure 2/2 COVID-19 PNA   -s/p bipap, ICU   -on HFNC 70% today  -titrate supplemental O2 as tolerated   -cont remdesivir, dexamethasone   -ID recs appreciated   -Encouraged IS, proning   -trend inflammatory markers     Elevated d-dimer  -cont to monitor   -LE duplex neg for DVT 2/28    HTN  -cont lisinopril     Prediabetes   -HgbA1c 6   -cont iSS     Obesity, BMI 32    DVT ppx: lovenox 40mg sq BID

## 2021-03-03 NOTE — PROGRESS NOTE ADULT - SUBJECTIVE AND OBJECTIVE BOX
BECKY HERNANDEZNEDAMARIAELENA    [id number]    55y      Male    CC: SOB    INTERVAL HPI/OVERNIGHT EVENTS: Pt seen and examined. Seen on 70% HFNC. Denies cp, sob, abd pain, n/v.     REVIEW OF SYSTEMS:    CONSTITUTIONAL: No fever, weight loss  RESPIRATORY: No  wheezing, hemoptysis  CARDIOVASCULAR: No chest pain, palpitations  GASTROINTESTINAL: No abdominal or epigastric pain. No nausea, vomiting  NEUROLOGICAL: No headaches, memory loss    Vital Signs Last 24 Hrs  T(C): 37.1 (03 Mar 2021 07:46), Max: 37.2 (03 Mar 2021 00:25)  T(F): 98.8 (03 Mar 2021 07:46), Max: 99 (03 Mar 2021 00:25)  HR: 73 (03 Mar 2021 09:06) (60 - 92)  BP: 110/70 (03 Mar 2021 07:46) (108/76 - 127/80)  BP(mean): 89 (03 Mar 2021 05:02) (89 - 96)  RR: 18 (03 Mar 2021 07:46) (18 - 20)  SpO2: 90% (03 Mar 2021 09:06) (90% - 96%)    PHYSICAL EXAM:    GENERAL: NAD  HEENT: PERRL, +EOMI  NECK: soft, supple  CHEST/LUNG: decreased sounds b/l bases; respirations unlabored on 70% HFNC   HEART: S1S2+, Regular rate and rhythm  ABDOMEN: Soft, Nontender, Nondistended; Bowel sounds present  SKIN: warm, dry  NEURO: AAOX3, no focal deficits    LABS:                        13.6   18.10 )-----------( 411      ( 03 Mar 2021 06:48 )             41.6     03-03    134<L>  |  99  |  22.0<H>  ----------------------------<  126<H>  4.0   |  23.0  |  0.82    Ca    8.3<L>      03 Mar 2021 06:48  Mg     1.9     03-03    TPro  6.6  /  Alb  2.7<L>  /  TBili  0.4  /  DBili  0.1  /  AST  32  /  ALT  32  /  AlkPhos  87  03-03    PT/INR - ( 03 Mar 2021 06:48 )   PT: 14.8 sec;   INR: 1.29 ratio                 MEDICATIONS  (STANDING):  dexAMETHasone  Injectable 6 milliGRAM(s) IV Push daily  dextrose 40% Gel 15 Gram(s) Oral once  dextrose 5%. 1000 milliLiter(s) (50 mL/Hr) IV Continuous <Continuous>  dextrose 5%. 1000 milliLiter(s) (100 mL/Hr) IV Continuous <Continuous>  dextrose 50% Injectable 25 Gram(s) IV Push once  dextrose 50% Injectable 12.5 Gram(s) IV Push once  dextrose 50% Injectable 25 Gram(s) IV Push once  enoxaparin Injectable 40 milliGRAM(s) SubCutaneous two times a day  famotidine Injectable 20 milliGRAM(s) IV Push two times a day  glucagon  Injectable 1 milliGRAM(s) IntraMuscular once  insulin lispro (ADMELOG) corrective regimen sliding scale   SubCutaneous three times a day before meals  insulin lispro (ADMELOG) corrective regimen sliding scale   SubCutaneous at bedtime  lisinopril 20 milliGRAM(s) Oral daily  remdesivir  IVPB 100 milliGRAM(s) IV Intermittent every 24 hours  remdesivir  IVPB   IV Intermittent     MEDICATIONS  (PRN):  acetaminophen   Tablet .. 650 milliGRAM(s) Oral every 6 hours PRN Mild Pain (1 - 3), Moderate Pain (4 - 6)  morphine  - Injectable 2 milliGRAM(s) IV Push every 4 hours PRN tachypnea      RADIOLOGY & ADDITIONAL TESTS:

## 2021-03-04 LAB
ALBUMIN SERPL ELPH-MCNC: 2.6 G/DL — LOW (ref 3.3–5.2)
ALBUMIN SERPL ELPH-MCNC: 2.7 G/DL — LOW (ref 3.3–5.2)
ALP SERPL-CCNC: 84 U/L — SIGNIFICANT CHANGE UP (ref 40–120)
ALP SERPL-CCNC: 86 U/L — SIGNIFICANT CHANGE UP (ref 40–120)
ALT FLD-CCNC: 29 U/L — SIGNIFICANT CHANGE UP
ALT FLD-CCNC: 30 U/L — SIGNIFICANT CHANGE UP
ANION GAP SERPL CALC-SCNC: 15 MMOL/L — SIGNIFICANT CHANGE UP (ref 5–17)
ANISOCYTOSIS BLD QL: SLIGHT — SIGNIFICANT CHANGE UP
AST SERPL-CCNC: 28 U/L — SIGNIFICANT CHANGE UP
AST SERPL-CCNC: 30 U/L — SIGNIFICANT CHANGE UP
BASOPHILS # BLD AUTO: 0 K/UL — SIGNIFICANT CHANGE UP (ref 0–0.2)
BASOPHILS NFR BLD AUTO: 0 % — SIGNIFICANT CHANGE UP (ref 0–2)
BILIRUB DIRECT SERPL-MCNC: 0.1 MG/DL — SIGNIFICANT CHANGE UP (ref 0–0.3)
BILIRUB INDIRECT FLD-MCNC: SIGNIFICANT CHANGE UP MG/DL (ref 0.2–1)
BILIRUB SERPL-MCNC: 0.4 MG/DL — SIGNIFICANT CHANGE UP (ref 0.4–2)
BILIRUB SERPL-MCNC: 0.4 MG/DL — SIGNIFICANT CHANGE UP (ref 0.4–2)
BUN SERPL-MCNC: 24 MG/DL — HIGH (ref 8–20)
CALCIUM SERPL-MCNC: 8.7 MG/DL — SIGNIFICANT CHANGE UP (ref 8.6–10.2)
CHLORIDE SERPL-SCNC: 100 MMOL/L — SIGNIFICANT CHANGE UP (ref 98–107)
CO2 SERPL-SCNC: 23 MMOL/L — SIGNIFICANT CHANGE UP (ref 22–29)
CREAT SERPL-MCNC: 1 MG/DL — SIGNIFICANT CHANGE UP (ref 0.5–1.3)
CRP SERPL-MCNC: 128 MG/L — HIGH
D DIMER BLD IA.RAPID-MCNC: 549 NG/ML DDU — HIGH
EOSINOPHIL # BLD AUTO: 0 K/UL — SIGNIFICANT CHANGE UP (ref 0–0.5)
EOSINOPHIL NFR BLD AUTO: 0 % — SIGNIFICANT CHANGE UP (ref 0–6)
FERRITIN SERPL-MCNC: 513 NG/ML — HIGH (ref 30–400)
GIANT PLATELETS BLD QL SMEAR: PRESENT — SIGNIFICANT CHANGE UP
GLUCOSE BLDC GLUCOMTR-MCNC: 127 MG/DL — HIGH (ref 70–99)
GLUCOSE BLDC GLUCOMTR-MCNC: 129 MG/DL — HIGH (ref 70–99)
GLUCOSE BLDC GLUCOMTR-MCNC: 173 MG/DL — HIGH (ref 70–99)
GLUCOSE BLDC GLUCOMTR-MCNC: 98 MG/DL — SIGNIFICANT CHANGE UP (ref 70–99)
GLUCOSE SERPL-MCNC: 124 MG/DL — HIGH (ref 70–99)
HCT VFR BLD CALC: 44.3 % — SIGNIFICANT CHANGE UP (ref 39–50)
HGB BLD-MCNC: 14.3 G/DL — SIGNIFICANT CHANGE UP (ref 13–17)
INR BLD: 1.4 RATIO — HIGH (ref 0.88–1.16)
LYMPHOCYTES # BLD AUTO: 1.46 K/UL — SIGNIFICANT CHANGE UP (ref 1–3.3)
LYMPHOCYTES # BLD AUTO: 7 % — LOW (ref 13–44)
MACROCYTES BLD QL: SLIGHT — SIGNIFICANT CHANGE UP
MAGNESIUM SERPL-MCNC: 2.2 MG/DL — SIGNIFICANT CHANGE UP (ref 1.6–2.6)
MANUAL SMEAR VERIFICATION: SIGNIFICANT CHANGE UP
MCHC RBC-ENTMCNC: 29.9 PG — SIGNIFICANT CHANGE UP (ref 27–34)
MCHC RBC-ENTMCNC: 32.3 GM/DL — SIGNIFICANT CHANGE UP (ref 32–36)
MCV RBC AUTO: 92.5 FL — SIGNIFICANT CHANGE UP (ref 80–100)
MONOCYTES # BLD AUTO: 1.81 K/UL — HIGH (ref 0–0.9)
MONOCYTES NFR BLD AUTO: 8.7 % — SIGNIFICANT CHANGE UP (ref 2–14)
MYELOCYTES NFR BLD: 1.7 % — HIGH (ref 0–0)
NEUTROPHILS # BLD AUTO: 17 K/UL — HIGH (ref 1.8–7.4)
NEUTROPHILS NFR BLD AUTO: 81.7 % — HIGH (ref 43–77)
PLAT MORPH BLD: ABNORMAL
PLATELET # BLD AUTO: 424 K/UL — HIGH (ref 150–400)
POLYCHROMASIA BLD QL SMEAR: SLIGHT — SIGNIFICANT CHANGE UP
POTASSIUM SERPL-MCNC: 4.5 MMOL/L — SIGNIFICANT CHANGE UP (ref 3.5–5.3)
POTASSIUM SERPL-SCNC: 4.5 MMOL/L — SIGNIFICANT CHANGE UP (ref 3.5–5.3)
PROCALCITONIN SERPL-MCNC: 0.51 NG/ML — HIGH (ref 0.02–0.1)
PROT SERPL-MCNC: 7.1 G/DL — SIGNIFICANT CHANGE UP (ref 6.6–8.7)
PROT SERPL-MCNC: 7.1 G/DL — SIGNIFICANT CHANGE UP (ref 6.6–8.7)
PROTHROM AB SERPL-ACNC: 16 SEC — HIGH (ref 10.6–13.6)
RBC # BLD: 4.79 M/UL — SIGNIFICANT CHANGE UP (ref 4.2–5.8)
RBC # FLD: 13.4 % — SIGNIFICANT CHANGE UP (ref 10.3–14.5)
RBC BLD AUTO: ABNORMAL
SODIUM SERPL-SCNC: 138 MMOL/L — SIGNIFICANT CHANGE UP (ref 135–145)
VARIANT LYMPHS # BLD: 0.9 % — SIGNIFICANT CHANGE UP (ref 0–6)
WBC # BLD: 20.81 K/UL — HIGH (ref 3.8–10.5)
WBC # FLD AUTO: 20.81 K/UL — HIGH (ref 3.8–10.5)

## 2021-03-04 PROCEDURE — 93970 EXTREMITY STUDY: CPT | Mod: 26

## 2021-03-04 PROCEDURE — 99232 SBSQ HOSP IP/OBS MODERATE 35: CPT

## 2021-03-04 PROCEDURE — 99233 SBSQ HOSP IP/OBS HIGH 50: CPT

## 2021-03-04 RX ORDER — REMDESIVIR 5 MG/ML
100 INJECTION INTRAVENOUS EVERY 24 HOURS
Refills: 0 | Status: COMPLETED | OUTPATIENT
Start: 2021-03-05 | End: 2021-03-09

## 2021-03-04 RX ORDER — TOCILIZUMAB 20 MG/ML
600 INJECTION, SOLUTION, CONCENTRATE INTRAVENOUS ONCE
Refills: 0 | Status: DISCONTINUED | OUTPATIENT
Start: 2021-03-04 | End: 2021-03-04

## 2021-03-04 RX ADMIN — FAMOTIDINE 20 MILLIGRAM(S): 10 INJECTION INTRAVENOUS at 11:00

## 2021-03-04 RX ADMIN — LISINOPRIL 20 MILLIGRAM(S): 2.5 TABLET ORAL at 05:22

## 2021-03-04 RX ADMIN — REMDESIVIR 500 MILLIGRAM(S): 5 INJECTION INTRAVENOUS at 04:13

## 2021-03-04 RX ADMIN — Medication 6 MILLIGRAM(S): at 05:22

## 2021-03-04 RX ADMIN — ENOXAPARIN SODIUM 40 MILLIGRAM(S): 100 INJECTION SUBCUTANEOUS at 05:22

## 2021-03-04 RX ADMIN — FAMOTIDINE 20 MILLIGRAM(S): 10 INJECTION INTRAVENOUS at 05:22

## 2021-03-04 RX ADMIN — ENOXAPARIN SODIUM 40 MILLIGRAM(S): 100 INJECTION SUBCUTANEOUS at 18:57

## 2021-03-04 RX ADMIN — Medication 1: at 11:06

## 2021-03-04 NOTE — PROGRESS NOTE ADULT - SUBJECTIVE AND OBJECTIVE BOX
[facility]  INFECTIOUS DISEASES AND INTERNAL MEDICINE at [place]  =======================================================  Domingo Monsivais MD  Diplomates American Board of Internal Medicine and Infectious Diseases  Tel: [phone]      Fax: [phone]  =======================================================    BECKY GUERRA [id number]    Follow up: COVID 19    Now on HFNC    Worsening O2 requirement      Allergies:  No Known Allergies      REVIEW OF SYSTEMS:  CONSTITUTIONAL:  No Fever or chills  HEENT:   No diplopia or blurred vision.  No earache, sore throat or runny nose.  CARDIOVASCULAR:  No pressure, squeezing, strangling, tightness, heaviness or aching about the chest, neck, axilla or epigastrium.  RESPIRATORY:  + cough, + shortness of breath  GASTROINTESTINAL:  No nausea, vomiting or diarrhea.  GENITOURINARY:  No dysuria, frequency or urgency.  MUSCULOSKELETAL:  no joint aches, no muscle pain  SKIN:  No change in skin, hair or nails.  NEUROLOGIC:  No Headaches, seizures   PSYCHIATRIC:  No disorder of thought or mood.  ENDOCRINE:  No heat or cold intolerance  HEMATOLOGICAL:  No easy bruising or bleeding.       Physical Exam:  GEN: NAD  HEENT: normocephalic and atraumatic. EOMI. PERRL.  Anicteric   NECK: Supple.   LUNGS: Coarse BS B/L  HEART: Regular rate and rhythm  ABDOMEN: Soft, nontender, and nondistended.  Positive bowel sounds.    : No CVA tenderness  EXTREMITIES: Without any edema.  MSK: no joint swelling  NEUROLOGIC: No focal deficits  PSYCHIATRIC: Appropriate affect .  SKIN: No Rash      Vitals:  T(F): 99.7 (04 Mar 2021 08:08), Max: 99.8 (03 Mar 2021 20:00)  HR: 81 (04 Mar 2021 08:30)  BP: 103/66 (04 Mar 2021 08:08)  RR: 18 (04 Mar 2021 08:08)  SpO2: 94% (04 Mar 2021 08:30) (90% - 98%)  temp max in last 48H T(F): , Max: 99.8 (03-03-21 @ 20:00)    Current Antibiotics:      Other medications:  dexAMETHasone  Injectable 6 milliGRAM(s) IV Push daily  dextrose 40% Gel 15 Gram(s) Oral once  dextrose 5%. 1000 milliLiter(s) IV Continuous <Continuous>  dextrose 5%. 1000 milliLiter(s) IV Continuous <Continuous>  dextrose 50% Injectable 25 Gram(s) IV Push once  dextrose 50% Injectable 12.5 Gram(s) IV Push once  dextrose 50% Injectable 25 Gram(s) IV Push once  enoxaparin Injectable 40 milliGRAM(s) SubCutaneous two times a day  famotidine Injectable 20 milliGRAM(s) IV Push two times a day  glucagon  Injectable 1 milliGRAM(s) IntraMuscular once  insulin lispro (ADMELOG) corrective regimen sliding scale   SubCutaneous three times a day before meals  insulin lispro (ADMELOG) corrective regimen sliding scale   SubCutaneous at bedtime  lisinopril 20 milliGRAM(s) Oral daily               14.3   20.81 )-----------( 424      ( 04 Mar 2021 07:34 )             44.3     03-04    138  |  100  |  24.0<H>  ----------------------------<  124<H>  4.5   |  23.0  |  1.00    Ca    8.7      04 Mar 2021 07:34  Mg     2.2     03-04    TPro  7.1  /  Alb  2.7<L>  /  TBili  0.4  /  DBili  0.1  /  AST  28  /  ALT  29  /  AlkPhos  86  03-04      WBC Count: 20.81 K/uL (03-04-21 @ 07:34)  WBC Count: 18.10 K/uL (03-03-21 @ 06:48)  WBC Count: 17.10 K/uL (03-02-21 @ 10:56)  WBC Count: 16.35 K/uL (03-01-21 @ 04:20)  WBC Count: 13.88 K/uL (02-27-21 @ 22:13)    Creatinine, Serum: 1.00 mg/dL (03-04-21 @ 07:34)  Creatinine, Serum: 0.82 mg/dL (03-03-21 @ 06:48)  Creatinine, Serum: 0.88 mg/dL (03-02-21 @ 10:56)  Creatinine, Serum: 0.77 mg/dL (03-02-21 @ 09:55)  Creatinine, Serum: 1.02 mg/dL (03-01-21 @ 04:20)  Creatinine, Serum: 0.89 mg/dL (02-28-21 @ 06:07)  Creatinine, Serum: 1.13 mg/dL (02-27-21 @ 22:13)    C-Reactive Protein, Serum: 128 mg/L (03-04-21 @ 07:34)  C-Reactive Protein, Serum: 45 mg/L (03-03-21 @ 06:48)  C-Reactive Protein, Serum: 30 mg/L (03-02-21 @ 10:56)  C-Reactive Protein, Serum: 16.87 mg/dL (02-27-21 @ 22:13)    Ferritin, Serum: 513 ng/mL (03-04-21 @ 07:34)  Ferritin, Serum: 659 ng/mL (02-27-21 @ 22:13)    Procalcitonin, Serum: 0.51 ng/mL (03-04-21 @ 07:34)  Procalcitonin, Serum: 0.28 ng/mL (03-03-21 @ 06:48)  Procalcitonin, Serum: 0.25 ng/mL (02-27-21 @ 22:13)    COVID-19 IgG Antibody Interpretation: Negative (02-28-21 @ 12:37)  COVID-19 IgG Antibody Index: 0.14 Index (02-28-21 @ 12:37)  COVID-19 PCR: Detected (02-27-21 @ 22:15)      < from: US Duplex Venous Lower Ext Complete, Bilateral (02.28.21 @ 19:07) >  EXAM:  US DPLX LWR EXT VEINS COMPL BI                          PROCEDURE DATE:  02/28/2021      INTERPRETATION:  CLINICAL INFORMATION: Lower extremity swelling. Covid 19.    COMPARISON: None available.    TECHNIQUE: Duplex sonography of the BILATERAL LOWER extremity veins with color and spectral Doppler, with and without compression.    FINDINGS:    There is normal compressibility of the bilateral common femoral, femoral and popliteal veins.  Doppler examination shows normal spontaneous and phasic flow.    No calf vein thrombosis is detected.    IMPRESSION:  No evidence of deep venous thrombosis in either lower extremity.    < end of copied text >

## 2021-03-04 NOTE — PROGRESS NOTE ADULT - ASSESSMENT
55y/oM PMH HTN, HLD, DM presenting with SOB, admitted with hypoxic respiratory failure to ICU, placed on Bipap. Weaned to HFNC and downgraded 3/1.     Acute hypoxic respiratory failure 2/2 COVID-19 PNA   -s/p bipap, ICU   -on HFNC 90% today  -titrate supplemental O2 as tolerated   -cont remdesivir, dexamethasone   -ID recs appreciated   -Encouraged IS, proning   -trend inflammatory markers   -pt not candidate for Actemra     Elevated d-dimer  -cont to monitor   -LE duplex neg for DVT 2/28  -f/u repeat d-dimer  -CTA ordered, if unable to get, repeat LE duplex     HTN  -cont lisinopril     Prediabetes   -HgbA1c 6   -cont iSS     Obesity, BMI 32    DVT ppx: lovenox 40mg sq BID

## 2021-03-04 NOTE — PROGRESS NOTE ADULT - ASSESSMENT
55y  Male with h/o HTN. Patient presented with shortness of breath. His symptoms began 2/22 with cough, subjective fevers, myalgias, nausea, and SOB. He went to urgent care and was found to be covid + on 2/22. Since his symptoms have gotten worse. He called EMS jose alfredo worsening SOB. EMS found him with sats of 50s on room air, brought him to the ED and was placed on BIPAP for increase work of breathing. O2 Sats improved but RR did not. Patint was admitted to MICU for COVID 19 PNA and respiratory failure.      Acute Hypoxic respiratory failure  COVID-19 infection  B/L Pneumonia   cough  shortness of breath      - COVID 19 PCR positive   - Continue supportive care measures  - continue to trend inflammatory markers.   - trend CBC with diff, CMP,  CRP, Ferritin,  procalcitonin q 48hours  - Avoid antibiotics unless there is a concern for a bacterial infection  - May consider vitamin C, thiamine and zinc (note lack of evidence to support benefit with COVID 19)  - Continue Remdesivir 200mg IV x1 followed by 100mg IV daily   - Due to worsening Hypoxia will Actemra 600mg IV x 1 dose  - Explained to the patient via  [id number] the risks and benefits of Actemra, and he is agreeable to trial this experimental treatment  - No signs of systemic infection at this time.   - Dexamethasone 6mg Daily   - follow up all outstanding cultures  - Trend Fever  - Trend Leukocytosis    Will follow   55y  Male with h/o HTN. Patient presented with shortness of breath. His symptoms began 2/22 with cough, subjective fevers, myalgias, nausea, and SOB. He went to urgent care and was found to be covid + on 2/22. Since his symptoms have gotten worse. He called EMS jose alfredo worsening SOB. EMS found him with sats of 50s on room air, brought him to the ED and was placed on BIPAP for increase work of breathing. O2 Sats improved but RR did not. Patint was admitted to MICU for COVID 19 PNA and respiratory failure.      Acute Hypoxic respiratory failure  COVID-19 infection  B/L Pneumonia   cough  shortness of breath      - COVID 19 PCR positive   - Continue supportive care measures  - continue to trend inflammatory markers.   - trend CBC with diff, CMP,  CRP, Ferritin,  procalcitonin q 48hours  - Avoid antibiotics unless there is a concern for a bacterial infection  - May consider vitamin C, thiamine and zinc (note lack of evidence to support benefit with COVID 19)  - Continue Remdesivir 200mg IV x1 followed by 100mg IV daily   - Patient is not a candidate for Actemra based on [facility] COVID guidelines   - Dexamethasone 6mg Daily   - follow up all outstanding cultures  - Trend Fever  - Trend Leukocytosis    Will follow

## 2021-03-04 NOTE — PROGRESS NOTE ADULT - SUBJECTIVE AND OBJECTIVE BOX
BECKY HERNANDEZNEDAMARIAELENA    [id number]    55y      Male    CC: sob    INTERVAL HPI/OVERNIGHT EVENTS:     REVIEW OF SYSTEMS:    CONSTITUTIONAL: No fever, weight loss, or fatigue  RESPIRATORY: No cough, wheezing, hemoptysis; No shortness of breath  CARDIOVASCULAR: No chest pain, palpitations  GASTROINTESTINAL: No abdominal or epigastric pain. No nausea, vomiting  NEUROLOGICAL: No headaches, memory loss, loss of strength.    Vital Signs Last 24 Hrs  T(C): 37 (04 Mar 2021 12:00), Max: 37.7 (03 Mar 2021 20:00)  T(F): 98.6 (04 Mar 2021 12:00), Max: 99.8 (03 Mar 2021 20:00)  HR: 83 (04 Mar 2021 12:00) (75 - 89)  BP: 95/59 (04 Mar 2021 12:00) (95/59 - 118/87)  BP(mean): 87 (04 Mar 2021 05:21) (87 - 96)  RR: 20 (04 Mar 2021 12:00) (18 - 22)  SpO2: 92% (04 Mar 2021 12:00) (90% - 98%)    PHYSICAL EXAM:    GENERAL: NAD  HEENT: PERRL, +EOMI  NECK: soft, supple  CHEST/LUNG: Clear to auscultation bilaterally; No wheezing  HEART: S1S2+, Regular rate and rhythm; No murmurs, rubs, or gallops  ABDOMEN: Soft, Nontender, Nondistended; Bowel sounds present  SKIN: No rashes or lesions  NEURO: AAOX3, no focal deficits, no motor or sensory loss  PSYCH: normal mood    LABS:                        14.3   20.81 )-----------( 424      ( 04 Mar 2021 07:34 )             44.3     03-04    138  |  100  |  24.0<H>  ----------------------------<  124<H>  4.5   |  23.0  |  1.00    Ca    8.7      04 Mar 2021 07:34  Mg     2.2     03-04    TPro  7.1  /  Alb  2.7<L>  /  TBili  0.4  /  DBili  0.1  /  AST  28  /  ALT  29  /  AlkPhos  86  03-04    PT/INR - ( 04 Mar 2021 07:34 )   PT: 16.0 sec;   INR: 1.40 ratio             MEDICATIONS  (STANDING):  dexAMETHasone  Injectable 6 milliGRAM(s) IV Push daily  dextrose 40% Gel 15 Gram(s) Oral once  dextrose 5%. 1000 milliLiter(s) (50 mL/Hr) IV Continuous <Continuous>  dextrose 5%. 1000 milliLiter(s) (100 mL/Hr) IV Continuous <Continuous>  dextrose 50% Injectable 25 Gram(s) IV Push once  dextrose 50% Injectable 12.5 Gram(s) IV Push once  dextrose 50% Injectable 25 Gram(s) IV Push once  enoxaparin Injectable 40 milliGRAM(s) SubCutaneous two times a day  famotidine Injectable 20 milliGRAM(s) IV Push two times a day  glucagon  Injectable 1 milliGRAM(s) IntraMuscular once  insulin lispro (ADMELOG) corrective regimen sliding scale   SubCutaneous three times a day before meals  insulin lispro (ADMELOG) corrective regimen sliding scale   SubCutaneous at bedtime  lisinopril 20 milliGRAM(s) Oral daily    MEDICATIONS  (PRN):  acetaminophen   Tablet .. 650 milliGRAM(s) Oral every 6 hours PRN Mild Pain (1 - 3), Moderate Pain (4 - 6)  morphine  - Injectable 2 milliGRAM(s) IV Push every 4 hours PRN tachypnea      RADIOLOGY & ADDITIONAL TESTS:   BECKY HERNANDEZNEDAMARIAELENA    [id number]    55y      Male    CC: sob    INTERVAL HPI/OVERNIGHT EVENTS: Pt seen and examined. seen on 90% HFNC this am, increasing O2 requirements. Pt denies CP. denies worsening SOB, LE pain or edema     REVIEW OF SYSTEMS:    CONSTITUTIONAL: No fever, weight loss  RESPIRATORY: No wheezing, hemoptysis  CARDIOVASCULAR: No chest pain, palpitations  GASTROINTESTINAL: No abdominal or epigastric pain. No nausea, vomiting  NEUROLOGICAL: No headaches, memory loss    Vital Signs Last 24 Hrs  T(C): 37 (04 Mar 2021 12:00), Max: 37.7 (03 Mar 2021 20:00)  T(F): 98.6 (04 Mar 2021 12:00), Max: 99.8 (03 Mar 2021 20:00)  HR: 83 (04 Mar 2021 12:00) (75 - 89)  BP: 95/59 (04 Mar 2021 12:00) (95/59 - 118/87)  BP(mean): 87 (04 Mar 2021 05:21) (87 - 96)  RR: 20 (04 Mar 2021 12:00) (18 - 22)  SpO2: 92% (04 Mar 2021 12:00) (90% - 98%)    PHYSICAL EXAM:    GENERAL: NAD  HEENT:  +EOMI  NECK: soft, supple  CHEST/LUNG: decreased breath sounds b/l; respirations unlabored on 90% HFNC   HEART: S1S2+, Regular rate and rhythm  ABDOMEN: Soft, Nontender, Nondistended; Bowel sounds present  SKIN: warm, dry  NEURO: AAOX3, no focal deficits, no motor or sensory loss  PSYCH: normal affect     LABS:                        14.3   20.81 )-----------( 424      ( 04 Mar 2021 07:34 )             44.3     03-04    138  |  100  |  24.0<H>  ----------------------------<  124<H>  4.5   |  23.0  |  1.00    Ca    8.7      04 Mar 2021 07:34  Mg     2.2     03-04    TPro  7.1  /  Alb  2.7<L>  /  TBili  0.4  /  DBili  0.1  /  AST  28  /  ALT  29  /  AlkPhos  86  03-04    PT/INR - ( 04 Mar 2021 07:34 )   PT: 16.0 sec;   INR: 1.40 ratio             MEDICATIONS  (STANDING):  dexAMETHasone  Injectable 6 milliGRAM(s) IV Push daily  dextrose 40% Gel 15 Gram(s) Oral once  dextrose 5%. 1000 milliLiter(s) (50 mL/Hr) IV Continuous <Continuous>  dextrose 5%. 1000 milliLiter(s) (100 mL/Hr) IV Continuous <Continuous>  dextrose 50% Injectable 25 Gram(s) IV Push once  dextrose 50% Injectable 12.5 Gram(s) IV Push once  dextrose 50% Injectable 25 Gram(s) IV Push once  enoxaparin Injectable 40 milliGRAM(s) SubCutaneous two times a day  famotidine Injectable 20 milliGRAM(s) IV Push two times a day  glucagon  Injectable 1 milliGRAM(s) IntraMuscular once  insulin lispro (ADMELOG) corrective regimen sliding scale   SubCutaneous three times a day before meals  insulin lispro (ADMELOG) corrective regimen sliding scale   SubCutaneous at bedtime  lisinopril 20 milliGRAM(s) Oral daily    MEDICATIONS  (PRN):  acetaminophen   Tablet .. 650 milliGRAM(s) Oral every 6 hours PRN Mild Pain (1 - 3), Moderate Pain (4 - 6)  morphine  - Injectable 2 milliGRAM(s) IV Push every 4 hours PRN tachypnea      RADIOLOGY & ADDITIONAL TESTS:

## 2021-03-05 LAB
ALBUMIN SERPL ELPH-MCNC: 2.4 G/DL — LOW (ref 3.3–5.2)
ALP SERPL-CCNC: 77 U/L — SIGNIFICANT CHANGE UP (ref 40–120)
ALT FLD-CCNC: 23 U/L — SIGNIFICANT CHANGE UP
ANION GAP SERPL CALC-SCNC: 18 MMOL/L — HIGH (ref 5–17)
AST SERPL-CCNC: 20 U/L — SIGNIFICANT CHANGE UP
BASE EXCESS BLDA CALC-SCNC: 0.7 MMOL/L — SIGNIFICANT CHANGE UP (ref -2–2)
BASOPHILS # BLD AUTO: 0.09 K/UL — SIGNIFICANT CHANGE UP (ref 0–0.2)
BASOPHILS NFR BLD AUTO: 0.4 % — SIGNIFICANT CHANGE UP (ref 0–2)
BILIRUB DIRECT SERPL-MCNC: 0.1 MG/DL — SIGNIFICANT CHANGE UP (ref 0–0.3)
BILIRUB INDIRECT FLD-MCNC: 0.2 MG/DL — SIGNIFICANT CHANGE UP (ref 0.2–1)
BILIRUB SERPL-MCNC: 0.3 MG/DL — LOW (ref 0.4–2)
BLOOD GAS COMMENTS ARTERIAL: SIGNIFICANT CHANGE UP
BLOOD GAS COMMENTS ARTERIAL: SIGNIFICANT CHANGE UP
BUN SERPL-MCNC: 33 MG/DL — HIGH (ref 8–20)
CALCIUM SERPL-MCNC: 8.7 MG/DL — SIGNIFICANT CHANGE UP (ref 8.6–10.2)
CHLORIDE SERPL-SCNC: 102 MMOL/L — SIGNIFICANT CHANGE UP (ref 98–107)
CO2 SERPL-SCNC: 21 MMOL/L — LOW (ref 22–29)
CREAT SERPL-MCNC: 0.88 MG/DL — SIGNIFICANT CHANGE UP (ref 0.5–1.3)
CREAT SERPL-MCNC: 0.88 MG/DL — SIGNIFICANT CHANGE UP (ref 0.5–1.3)
D DIMER BLD IA.RAPID-MCNC: 765 NG/ML DDU — HIGH
EOSINOPHIL # BLD AUTO: 0.04 K/UL — SIGNIFICANT CHANGE UP (ref 0–0.5)
EOSINOPHIL NFR BLD AUTO: 0.2 % — SIGNIFICANT CHANGE UP (ref 0–6)
GAS PNL BLDA: SIGNIFICANT CHANGE UP
GLUCOSE BLDC GLUCOMTR-MCNC: 105 MG/DL — HIGH (ref 70–99)
GLUCOSE BLDC GLUCOMTR-MCNC: 109 MG/DL — HIGH (ref 70–99)
GLUCOSE BLDC GLUCOMTR-MCNC: 115 MG/DL — HIGH (ref 70–99)
GLUCOSE BLDC GLUCOMTR-MCNC: 191 MG/DL — HIGH (ref 70–99)
GLUCOSE SERPL-MCNC: 113 MG/DL — HIGH (ref 70–99)
HCO3 BLDA-SCNC: 25 MMOL/L — SIGNIFICANT CHANGE UP (ref 21–29)
HCT VFR BLD CALC: 42.4 % — SIGNIFICANT CHANGE UP (ref 39–50)
HGB BLD-MCNC: 13.9 G/DL — SIGNIFICANT CHANGE UP (ref 13–17)
HOROWITZ INDEX BLDA+IHG-RTO: 100 — SIGNIFICANT CHANGE UP
IMM GRANULOCYTES NFR BLD AUTO: 2.9 % — HIGH (ref 0–1.5)
INR BLD: 1.41 RATIO — HIGH (ref 0.88–1.16)
LYMPHOCYTES # BLD AUTO: 0.86 K/UL — LOW (ref 1–3.3)
LYMPHOCYTES # BLD AUTO: 3.3 % — LOW (ref 13–44)
MCHC RBC-ENTMCNC: 30.1 PG — SIGNIFICANT CHANGE UP (ref 27–34)
MCHC RBC-ENTMCNC: 32.8 GM/DL — SIGNIFICANT CHANGE UP (ref 32–36)
MCV RBC AUTO: 91.8 FL — SIGNIFICANT CHANGE UP (ref 80–100)
MONOCYTES # BLD AUTO: 0.99 K/UL — HIGH (ref 0–0.9)
MONOCYTES NFR BLD AUTO: 3.9 % — SIGNIFICANT CHANGE UP (ref 2–14)
NEUTROPHILS # BLD AUTO: 22.98 K/UL — HIGH (ref 1.8–7.4)
NEUTROPHILS NFR BLD AUTO: 89.3 % — HIGH (ref 43–77)
PCO2 BLDA: 37 MMHG — SIGNIFICANT CHANGE UP (ref 35–45)
PH BLDA: 7.44 — SIGNIFICANT CHANGE UP (ref 7.35–7.45)
PLATELET # BLD AUTO: 420 K/UL — HIGH (ref 150–400)
PO2 BLDA: 66 MMHG — LOW (ref 83–108)
POTASSIUM SERPL-MCNC: 4.8 MMOL/L — SIGNIFICANT CHANGE UP (ref 3.5–5.3)
POTASSIUM SERPL-SCNC: 4.8 MMOL/L — SIGNIFICANT CHANGE UP (ref 3.5–5.3)
PROT SERPL-MCNC: 6.9 G/DL — SIGNIFICANT CHANGE UP (ref 6.6–8.7)
PROTHROM AB SERPL-ACNC: 16.1 SEC — HIGH (ref 10.6–13.6)
RBC # BLD: 4.62 M/UL — SIGNIFICANT CHANGE UP (ref 4.2–5.8)
RBC # FLD: 13.4 % — SIGNIFICANT CHANGE UP (ref 10.3–14.5)
SAO2 % BLDA: 94 % — SIGNIFICANT CHANGE UP (ref 92–96)
SODIUM SERPL-SCNC: 140 MMOL/L — SIGNIFICANT CHANGE UP (ref 135–145)
WBC # BLD: 25.71 K/UL — HIGH (ref 3.8–10.5)
WBC # FLD AUTO: 25.71 K/UL — HIGH (ref 3.8–10.5)

## 2021-03-05 PROCEDURE — 71275 CT ANGIOGRAPHY CHEST: CPT | Mod: 26

## 2021-03-05 PROCEDURE — 99253 IP/OBS CNSLTJ NEW/EST LOW 45: CPT

## 2021-03-05 PROCEDURE — 71045 X-RAY EXAM CHEST 1 VIEW: CPT | Mod: 26

## 2021-03-05 PROCEDURE — 99232 SBSQ HOSP IP/OBS MODERATE 35: CPT

## 2021-03-05 PROCEDURE — 99233 SBSQ HOSP IP/OBS HIGH 50: CPT

## 2021-03-05 PROCEDURE — 99222 1ST HOSP IP/OBS MODERATE 55: CPT

## 2021-03-05 RX ORDER — TIOTROPIUM BROMIDE 18 UG/1
1 CAPSULE ORAL; RESPIRATORY (INHALATION) DAILY
Refills: 0 | Status: DISCONTINUED | OUTPATIENT
Start: 2021-03-05 | End: 2021-03-28

## 2021-03-05 RX ORDER — ENOXAPARIN SODIUM 100 MG/ML
85 INJECTION SUBCUTANEOUS EVERY 12 HOURS
Refills: 0 | Status: DISCONTINUED | OUTPATIENT
Start: 2021-03-05 | End: 2021-03-06

## 2021-03-05 RX ORDER — ENOXAPARIN SODIUM 100 MG/ML
40 INJECTION SUBCUTANEOUS ONCE
Refills: 0 | Status: COMPLETED | OUTPATIENT
Start: 2021-03-05 | End: 2021-03-05

## 2021-03-05 RX ORDER — BUDESONIDE AND FORMOTEROL FUMARATE DIHYDRATE 160; 4.5 UG/1; UG/1
2 AEROSOL RESPIRATORY (INHALATION)
Refills: 0 | Status: DISCONTINUED | OUTPATIENT
Start: 2021-03-05 | End: 2021-03-28

## 2021-03-05 RX ORDER — SODIUM CHLORIDE 0.65 %
1 AEROSOL, SPRAY (ML) NASAL
Refills: 0 | Status: DISCONTINUED | OUTPATIENT
Start: 2021-03-05 | End: 2021-03-22

## 2021-03-05 RX ADMIN — FAMOTIDINE 20 MILLIGRAM(S): 10 INJECTION INTRAVENOUS at 11:15

## 2021-03-05 RX ADMIN — Medication 6 MILLIGRAM(S): at 05:14

## 2021-03-05 RX ADMIN — ENOXAPARIN SODIUM 40 MILLIGRAM(S): 100 INJECTION SUBCUTANEOUS at 12:29

## 2021-03-05 RX ADMIN — REMDESIVIR 500 MILLIGRAM(S): 5 INJECTION INTRAVENOUS at 04:42

## 2021-03-05 RX ADMIN — ENOXAPARIN SODIUM 85 MILLIGRAM(S): 100 INJECTION SUBCUTANEOUS at 18:25

## 2021-03-05 RX ADMIN — Medication 1 SPRAY(S): at 18:25

## 2021-03-05 RX ADMIN — FAMOTIDINE 20 MILLIGRAM(S): 10 INJECTION INTRAVENOUS at 05:14

## 2021-03-05 RX ADMIN — Medication 650 MILLIGRAM(S): at 00:15

## 2021-03-05 RX ADMIN — Medication 1: at 11:18

## 2021-03-05 RX ADMIN — ENOXAPARIN SODIUM 40 MILLIGRAM(S): 100 INJECTION SUBCUTANEOUS at 05:14

## 2021-03-05 RX ADMIN — Medication 650 MILLIGRAM(S): at 05:15

## 2021-03-05 NOTE — PROGRESS NOTE ADULT - SUBJECTIVE AND OBJECTIVE BOX
[facility]  INFECTIOUS DISEASES AND INTERNAL MEDICINE at [place]  =======================================================  Domingo Monsivais MD  Diplomates American Board of Internal Medicine and Infectious Diseases  Tel: [phone]      Fax: [phone]  =======================================================    BECKY GUERRA [id number]    Follow up: COVID 19    Now on HFNC    Worsening O2 requirement      Allergies:  No Known Allergies      REVIEW OF SYSTEMS:  CONSTITUTIONAL:  No Fever or chills  HEENT:   No diplopia or blurred vision.  No earache, sore throat or runny nose.  CARDIOVASCULAR:  No pressure, squeezing, strangling, tightness, heaviness or aching about the chest, neck, axilla or epigastrium.  RESPIRATORY:  + cough, + shortness of breath  GASTROINTESTINAL:  No nausea, vomiting or diarrhea.  GENITOURINARY:  No dysuria, frequency or urgency.  MUSCULOSKELETAL:  no joint aches, no muscle pain  SKIN:  No change in skin, hair or nails.  NEUROLOGIC:  No Headaches, seizures   PSYCHIATRIC:  No disorder of thought or mood.  ENDOCRINE:  No heat or cold intolerance  HEMATOLOGICAL:  No easy bruising or bleeding.       Physical Exam:  GEN: NAD  HEENT: normocephalic and atraumatic. EOMI. PERRL.  Anicteric   NECK: Supple.   LUNGS: Coarse BS B/L  HEART: Regular rate and rhythm  ABDOMEN: Soft, nontender, and nondistended.  Positive bowel sounds.    : No CVA tenderness  EXTREMITIES: Without any edema.  MSK: no joint swelling  NEUROLOGIC: No focal deficits  PSYCHIATRIC: Appropriate affect .  SKIN: No Rash      Vitals:  T(F): 97 (05 Mar 2021 07:43), Max: 99.6 (04 Mar 2021 20:00)  HR: 79 (05 Mar 2021 08:57)  BP: 111/74 (05 Mar 2021 07:43)  RR: 20 (05 Mar 2021 07:43)  SpO2: 90% (05 Mar 2021 08:57) (90% - 95%)  temp max in last 48H T(F): , Max: 99.8 (03-03-21 @ 20:00)      Current Antibiotics:  remdesivir  IVPB 100 milliGRAM(s) IV Intermittent every 24 hours      Other medications:  dexAMETHasone  Injectable 6 milliGRAM(s) IV Push daily  dextrose 40% Gel 15 Gram(s) Oral once  dextrose 5%. 1000 milliLiter(s) IV Continuous <Continuous>  dextrose 5%. 1000 milliLiter(s) IV Continuous <Continuous>  dextrose 50% Injectable 25 Gram(s) IV Push once  dextrose 50% Injectable 12.5 Gram(s) IV Push once  dextrose 50% Injectable 25 Gram(s) IV Push once  enoxaparin Injectable 85 milliGRAM(s) SubCutaneous every 12 hours  enoxaparin Injectable 40 milliGRAM(s) SubCutaneous once  famotidine Injectable 20 milliGRAM(s) IV Push two times a day  glucagon  Injectable 1 milliGRAM(s) IntraMuscular once  insulin lispro (ADMELOG) corrective regimen sliding scale   SubCutaneous three times a day before meals  insulin lispro (ADMELOG) corrective regimen sliding scale   SubCutaneous at bedtime                 13.9   25.71 )-----------( 420      ( 05 Mar 2021 08:38 )             42.4     03-05    140  |  102  |  33.0<H>  ----------------------------<  113<H>  4.8   |  21.0<L>  |  0.88    Ca    8.7      05 Mar 2021 08:10  Mg     2.2     03-04    TPro  6.9  /  Alb  2.4<L>  /  TBili  0.3<L>  /  DBili  0.1  /  AST  20  /  ALT  23  /  AlkPhos  77  03-05      WBC Count: 25.71 K/uL (03-05-21 @ 08:38)  WBC Count: 20.81 K/uL (03-04-21 @ 07:34)  WBC Count: 18.10 K/uL (03-03-21 @ 06:48)  WBC Count: 17.10 K/uL (03-02-21 @ 10:56)  WBC Count: 16.35 K/uL (03-01-21 @ 04:20)    Creatinine, Serum: 0.88 mg/dL (03-05-21 @ 08:10)  Creatinine, Serum: 0.88 mg/dL (03-05-21 @ 08:10)  Creatinine, Serum: 1.00 mg/dL (03-04-21 @ 07:34)  Creatinine, Serum: 0.82 mg/dL (03-03-21 @ 06:48)  Creatinine, Serum: 0.88 mg/dL (03-02-21 @ 10:56)  Creatinine, Serum: 0.77 mg/dL (03-02-21 @ 09:55)  Creatinine, Serum: 1.02 mg/dL (03-01-21 @ 04:20)    C-Reactive Protein, Serum: 128 mg/L (03-04-21 @ 07:34)  C-Reactive Protein, Serum: 45 mg/L (03-03-21 @ 06:48)  C-Reactive Protein, Serum: 30 mg/L (03-02-21 @ 10:56)  C-Reactive Protein, Serum: 16.87 mg/dL (02-27-21 @ 22:13)    Ferritin, Serum: 513 ng/mL (03-04-21 @ 07:34)  Ferritin, Serum: 659 ng/mL (02-27-21 @ 22:13)    Procalcitonin, Serum: 0.51 ng/mL (03-04-21 @ 07:34)  Procalcitonin, Serum: 0.28 ng/mL (03-03-21 @ 06:48)  Procalcitonin, Serum: 0.25 ng/mL (02-27-21 @ 22:13)    COVID-19 IgG Antibody Interpretation: Negative (02-28-21 @ 12:37)  COVID-19 IgG Antibody Index: 0.14 Index (02-28-21 @ 12:37)  COVID-19 PCR: Detected (02-27-21 @ 22:15)

## 2021-03-05 NOTE — PROGRESS NOTE ADULT - ASSESSMENT
55y/oM PMH HTN, HLD, DM presenting with SOB, admitted with hypoxic respiratory failure to ICU, placed on Bipap. Weaned to HFNC and downgraded 3/1.     Acute hypoxic respiratory failure 2/2 COVID-19 PNA   Elevated d-dimer  - Was on BiPAP in ICU and weaned to HiFlo  - Remained on BiPAP (FiO2 100%) overnight, now on HFNC 100%  - titrate supplemental O2 as tolerated   - cont remdesivir, dexamethasone   - ID recs appreciated   - Encouraged IS, proning   - Trend inflammatory markers, ordered for AM    - pt not candidate for Actemra   - D-dimer Up trending   - LE duplex neg for DVT  - CTA ordered, will try to get it done today   - Will increase to full Lovenox dose for AC for now   - ABG, CXR ordered  - Pulmonary consult   - Will discuss with MICU     HTN  - Hold lisinopril for now as he will receive contrast   - Monitor BP     Prediabetes   - HgbA1c 6   - cont ISS     Obesity, BMI 32    DVT ppx: lovenox

## 2021-03-05 NOTE — PROGRESS NOTE ADULT - ASSESSMENT
55y  Male with h/o HTN. Patient presented with shortness of breath. His symptoms began 2/22 with cough, subjective fevers, myalgias, nausea, and SOB. He went to urgent care and was found to be covid + on 2/22. Since his symptoms have gotten worse. He called EMS jose alfredo worsening SOB. EMS found him with sats of 50s on room air, brought him to the ED and was placed on BIPAP for increase work of breathing. O2 Sats improved but RR did not. Patint was admitted to MICU for COVID 19 PNA and respiratory failure.      Acute Hypoxic respiratory failure  COVID-19 infection  B/L Pneumonia   cough  shortness of breath      - COVID 19 PCR positive   - Continue supportive care measures  - continue to trend inflammatory markers.   - trend CBC with diff, CMP,  CRP, Ferritin,  procalcitonin q 48hours  - Avoid antibiotics unless there is a concern for a bacterial infection  - May consider vitamin C, thiamine and zinc (note lack of evidence to support benefit with COVID 19)  - Continue Remdesivir 200mg IV x1 followed by 100mg IV daily to complete 10 days 3/9  - Patient is not a candidate for Actemra based on [facility] COVID guidelines   - Dexamethasone 6mg Daily   - follow up all outstanding cultures  - Trend Fever  - Trend Leukocytosis    Will follow

## 2021-03-05 NOTE — CONSULT NOTE ADULT - SUBJECTIVE AND OBJECTIVE BOX
Patient is a 55 year old male with a pmhx of HTN who presents with shortness of breath. Patient explains his symptoms began 2/22 with cough, kervin, myalgias, nausea, and SOB. Then went to urgent care and was found to be covid + on 2/22.  He was admitted to MICU, stabilized and downgraded.    Recalled to re-evaluate pt today for hypoxia, hospitalist felt pt was worse today.  Upon my arrival pt was sitting in chair on high flow NC at 90%/70L very comfortable.  At this time pt is tolerating high flow NC and doing well, he does not require ICU level of care at this time.  Patient is a 55y old  Male who presents with a chief complaint of covid hypoxia (05 Mar 2021 15:40)        PAST MEDICAL & SURGICAL HISTORY:  Hypertension    No significant past surgical history        Review of Systems:  CONSTITUTIONAL: No fever, chills, or fatigue  EYES: No eye pain, visual disturbances, or discharge  ENMT:  No difficulty hearing, tinnitus, vertigo; No sinus or throat pain  NECK: No pain or stiffness  RESPIRATORY: mild shortness of breath  CARDIOVASCULAR: No chest pain, palpitations, dizziness, or leg swelling  GASTROINTESTINAL: No abdominal or epigastric pain. No nausea, vomiting, or hematemesis; No diarrhea or constipation. No melena or hematochezia.  GENITOURINARY: No dysuria, frequency, hematuria, or incontinence  NEUROLOGICAL: No headaches, memory loss, loss of strength, numbness, or tremors  SKIN: No itching, burning, rashes, or lesions   MUSCULOSKELETAL: No joint pain or swelling; No muscle, back, or extremity pain  PSYCHIATRIC: No depression, anxiety, mood swings, or difficulty sleeping      Medications:  remdesivir  IVPB 100 milliGRAM(s) IV Intermittent every 24 hours      budesonide 160 MICROgram(s)/formoterol 4.5 MICROgram(s) Inhaler 2 Puff(s) Inhalation two times a day  tiotropium 18 MICROgram(s) Capsule 1 Capsule(s) Inhalation daily    acetaminophen   Tablet .. 650 milliGRAM(s) Oral every 6 hours PRN  morphine  - Injectable 2 milliGRAM(s) IV Push every 4 hours PRN      enoxaparin Injectable 85 milliGRAM(s) SubCutaneous every 12 hours    famotidine Injectable 20 milliGRAM(s) IV Push two times a day      dexAMETHasone  Injectable 6 milliGRAM(s) IV Push daily  dextrose 40% Gel 15 Gram(s) Oral once  dextrose 50% Injectable 25 Gram(s) IV Push once  dextrose 50% Injectable 12.5 Gram(s) IV Push once  dextrose 50% Injectable 25 Gram(s) IV Push once  glucagon  Injectable 1 milliGRAM(s) IntraMuscular once  insulin lispro (ADMELOG) corrective regimen sliding scale   SubCutaneous three times a day before meals  insulin lispro (ADMELOG) corrective regimen sliding scale   SubCutaneous at bedtime    dextrose 5%. 1000 milliLiter(s) IV Continuous <Continuous>  dextrose 5%. 1000 milliLiter(s) IV Continuous <Continuous>      sodium chloride 0.65% Nasal 1 Spray(s) Both Nostrils two times a day    ICU Vital Signs Last 24 Hrs  T(C): 37.1 (05 Mar 2021 12:00), Max: 37.6 (04 Mar 2021 20:00)  T(F): 98.8 (05 Mar 2021 12:00), Max: 99.6 (04 Mar 2021 20:00)  HR: 78 (05 Mar 2021 12:00) (70 - 80)  BP: 126/71 (05 Mar 2021 12:00) (98/61 - 126/71)  BP(mean): 86 (05 Mar 2021 12:00) (86 - 86)  ABP: --  ABP(mean): --  RR: 26 (05 Mar 2021 12:00) (16 - 26)  SpO2: 94% (05 Mar 2021 12:00) (90% - 95%)      ABG - ( 05 Mar 2021 15:19 )  pH, Arterial: 7.44  pH, Blood: x     /  pCO2: 37    /  pO2: 66    / HCO3: 25    / Base Excess: 0.7   /  SaO2: 94                        LABS:                        13.9   25.71 )-----------( 420      ( 05 Mar 2021 08:38 )             42.4     03-05    140  |  102  |  33.0<H>  ----------------------------<  113<H>  4.8   |  21.0<L>  |  0.88    Ca    8.7      05 Mar 2021 08:10  Mg     2.2     03-04    TPro  6.9  /  Alb  2.4<L>  /  TBili  0.3<L>  /  DBili  0.1  /  AST  20  /  ALT  23  /  AlkPhos  77  03-05          CAPILLARY BLOOD GLUCOSE      POCT Blood Glucose.: 191 mg/dL (05 Mar 2021 11:18)    PT/INR - ( 05 Mar 2021 08:10 )   PT: 16.1 sec;   INR: 1.41 ratio             CULTURES:      Physical Examination:    General: No acute distress.  Alert, oriented, interactive, nonfocal    HEENT: Pupils equal, reactive to light.  Symmetric.    PULM: diminished at bases bilaterally, no significant sputum production    CVS: Regular rate and rhythm, no murmurs, rubs, or gallops    ABD: Soft, nondistended, nontender, normoactive bowel sounds, no masses    EXT: No edema, nontender    SKIN: Warm and well perfused, no rashes noted.    RADIOLOGY: images reviewed    CRITICAL CARE TIME SPENT: *

## 2021-03-05 NOTE — PROGRESS NOTE ADULT - SUBJECTIVE AND OBJECTIVE BOX
[facility] Division of Hospital Medicine    Chief Complaint:  SOB    SUBJECTIVE / OVERNIGHT EVENTS:  Pashto video  used for this encounter   Remained on BiPAP with FiO2 100 % overnight  This morning is on HiFlo   Pt says that he feels OK, has not noticed any change in his breathing     Patient denies abd pain, N/V, fever, chills, dysuria or any other complaints. All remainder ROS negative.     MEDICATIONS  (STANDING):  budesonide 160 MICROgram(s)/formoterol 4.5 MICROgram(s) Inhaler 2 Puff(s) Inhalation two times a day  dexAMETHasone  Injectable 6 milliGRAM(s) IV Push daily  dextrose 40% Gel 15 Gram(s) Oral once  dextrose 5%. 1000 milliLiter(s) (50 mL/Hr) IV Continuous <Continuous>  dextrose 5%. 1000 milliLiter(s) (100 mL/Hr) IV Continuous <Continuous>  dextrose 50% Injectable 25 Gram(s) IV Push once  dextrose 50% Injectable 12.5 Gram(s) IV Push once  dextrose 50% Injectable 25 Gram(s) IV Push once  enoxaparin Injectable 85 milliGRAM(s) SubCutaneous every 12 hours  famotidine Injectable 20 milliGRAM(s) IV Push two times a day  glucagon  Injectable 1 milliGRAM(s) IntraMuscular once  insulin lispro (ADMELOG) corrective regimen sliding scale   SubCutaneous three times a day before meals  insulin lispro (ADMELOG) corrective regimen sliding scale   SubCutaneous at bedtime  remdesivir  IVPB 100 milliGRAM(s) IV Intermittent every 24 hours  sodium chloride 0.65% Nasal 1 Spray(s) Both Nostrils two times a day  tiotropium 18 MICROgram(s) Capsule 1 Capsule(s) Inhalation daily    MEDICATIONS  (PRN):  acetaminophen   Tablet .. 650 milliGRAM(s) Oral every 6 hours PRN Mild Pain (1 - 3), Moderate Pain (4 - 6)  morphine  - Injectable 2 milliGRAM(s) IV Push every 4 hours PRN tachypnea        I&O's Summary    04 Mar 2021 07:01  -  05 Mar 2021 07:00  --------------------------------------------------------  IN: 0 mL / OUT: 1300 mL / NET: -1300 mL        PHYSICAL EXAM:  Vital Signs Last 24 Hrs  T(C): 37.1 (05 Mar 2021 12:00), Max: 37.6 (04 Mar 2021 20:00)  T(F): 98.8 (05 Mar 2021 12:00), Max: 99.6 (04 Mar 2021 20:00)  HR: 78 (05 Mar 2021 12:00) (70 - 80)  BP: 126/71 (05 Mar 2021 12:00) (98/61 - 126/71)  BP(mean): 86 (05 Mar 2021 12:00) (86 - 86)  RR: 26 (05 Mar 2021 12:00) (16 - 26)  SpO2: 94% (05 Mar 2021 12:00) (90% - 95%)        CONSTITUTIONAL: NAD, On HiFlo   ENMT: Moist oral mucosa  RESPIRATORY: Normal respiratory effort; lungs with reduced BS bilaterally  CARDIOVASCULAR: Regular rate and rhythm, normal S1 and S2, No lower extremity edema  ABDOMEN: Nontender to palpation, normoactive bowel sounds  MUSCLOSKELETAL: no clubbing or cyanosis of digits; no joint swelling or tenderness to palpation  PSYCH: A+O to person, place, and time; affect appropriate  NEUROLOGY: CN 2-12 are intact and symmetric; no gross sensory deficits  SKIN: No rashes; no palpable lesions    LABS:                        13.9   25.71 )-----------( 420      ( 05 Mar 2021 08:38 )             42.4     03-05    140  |  102  |  33.0<H>  ----------------------------<  113<H>  4.8   |  21.0<L>  |  0.88    Ca    8.7      05 Mar 2021 08:10  Mg     2.2     03-04    TPro  6.9  /  Alb  2.4<L>  /  TBili  0.3<L>  /  DBili  0.1  /  AST  20  /  ALT  23  /  AlkPhos  77  03-05    PT/INR - ( 05 Mar 2021 08:10 )   PT: 16.1 sec;   INR: 1.41 ratio                   CAPILLARY BLOOD GLUCOSE      POCT Blood Glucose.: 191 mg/dL (05 Mar 2021 11:18)  POCT Blood Glucose.: 115 mg/dL (05 Mar 2021 05:05)  POCT Blood Glucose.: 98 mg/dL (04 Mar 2021 21:19)  POCT Blood Glucose.: 127 mg/dL (04 Mar 2021 16:42)

## 2021-03-05 NOTE — CONSULT NOTE ADULT - PROBLEM SELECTOR RECOMMENDATION 2
On high flow NC tolerating well with 02 sat 96% at present  Can use NIPPV if increased work of breathing but currently does not require   At this time pt does not require ICU level of care.

## 2021-03-05 NOTE — CONSULT NOTE ADULT - SUBJECTIVE AND OBJECTIVE BOX
PULMONARY CONSULT NOTE    MARI  MRN   [id number]    54y/o male admitted  2/27/2021  with hypoxia pneumonia covid +    55 year old male with a pmhx of HTN who presents with shortness of breath. Patient explains his symptoms began 2/22 with cough, kervin, myalgias, nausea, and SOB. Then went to urgent care and was found to be covid + on 2/22. Since then symptoms have gotten worse. Then came to [facility] for severe shortness of breath. EMS found him with sats of 50s on room air. Then upon arrival he was placed on bipap for increase work of breathing.    - was seen by MICU  - treated with dexamethasone placed on bipap   -treated with remdesivir   - noted to have increase sob on 3/4/2021    - improved currently on 100 %  HF  70 l/min speaking full sentences  - feeling better    - called to evaluate  high d dimer  3/5/2021 765   3/4/2021 549  3/3 561   3/2/    460     BRIEF HOSPITAL COURSE: ***    Events last 24 hours: ***    PAST MEDICAL & SURGICAL HISTORY:  Hypertension    No significant past surgical history          Medications:  remdesivir  IVPB 100 milliGRAM(s) IV Intermittent every 24 hours        acetaminophen   Tablet .. 650 milliGRAM(s) Oral every 6 hours PRN  morphine  - Injectable 2 milliGRAM(s) IV Push every 4 hours PRN      enoxaparin Injectable 85 milliGRAM(s) SubCutaneous every 12 hours    famotidine Injectable 20 milliGRAM(s) IV Push two times a day      dexAMETHasone  Injectable 6 milliGRAM(s) IV Push daily  dextrose 40% Gel 15 Gram(s) Oral once  dextrose 50% Injectable 25 Gram(s) IV Push once  dextrose 50% Injectable 12.5 Gram(s) IV Push once  dextrose 50% Injectable 25 Gram(s) IV Push once  glucagon  Injectable 1 milliGRAM(s) IntraMuscular once  insulin lispro (ADMELOG) corrective regimen sliding scale   SubCutaneous three times a day before meals  insulin lispro (ADMELOG) corrective regimen sliding scale   SubCutaneous at bedtime    dextrose 5%. 1000 milliLiter(s) IV Continuous <Continuous>  dextrose 5%. 1000 milliLiter(s) IV Continuous <Continuous>                ICU Vital Signs Last 24 Hrs  T(C): 37.1 (05 Mar 2021 12:00), Max: 37.6 (04 Mar 2021 20:00)  T(F): 98.8 (05 Mar 2021 12:00), Max: 99.6 (04 Mar 2021 20:00)  HR: 78 (05 Mar 2021 12:00) (70 - 80)  BP: 126/71 (05 Mar 2021 12:00) (98/61 - 126/71)  BP(mean): 86 (05 Mar 2021 12:00) (86 - 86)  ABP: --  ABP(mean): --  RR: 26 (05 Mar 2021 12:00) (16 - 26)  SpO2: 94% (05 Mar 2021 12:00) (90% - 95%)          I&O's Detail    04 Mar 2021 07:01  -  05 Mar 2021 07:00  --------------------------------------------------------  IN:  Total IN: 0 mL    OUT:    Voided (mL): 1300 mL  Total OUT: 1300 mL    Total NET: -1300 mL            LABS:                        13.9   25.71 )-----------( 420      ( 05 Mar 2021 08:38 )             42.4     03-05    140  |  102  |  33.0<H>  ----------------------------<  113<H>  4.8   |  21.0<L>  |  0.88    Ca    8.7      05 Mar 2021 08:10  Mg     2.2     03-04    TPro  6.9  /  Alb  2.4<L>  /  TBili  0.3<L>  /  DBili  0.1  /  AST  20  /  ALT  23  /  AlkPhos  77  03-05          CAPILLARY BLOOD GLUCOSE      POCT Blood Glucose.: 191 mg/dL (05 Mar 2021 11:18)    PT/INR - ( 05 Mar 2021 08:10 )   PT: 16.1 sec;   INR: 1.41 ratio             CULTURES:      Physical Examination:    General:  pleasant  male  no distress  HEENT: Pupils equal, reactive to light.  Symmetric.  sclera anicteric     PULM:  b/l air entry no w /r/r   NECK: Supple, no lymphadenopathy, trachea midline    CVS: Regular rate and rhythm, no murmurs, rubs, or gallops    ABD: Soft, nondistended, nontender, normoactive bowel sounds, no masses    EXT: No edema, nontender    SKIN: Warm and well perfused, no rashes noted.    NEURO: Alert, oriented, interactive, nonfocal        RADIOLOGY:     *MPRESSION: There is diffuse bilateral patchy opacification throughout both lungs but more prominently on the left concerning for multifocal pneumonia. The heart size cannot be evaluated. The bones are intact.            COLLEEN HALLMAN MD; Attending Radiologist  This document has been electronically signed. Feb 28 2021 12:52PM      IMPRESSION:  No evidence of deep venous thrombosis in either upper extremity.              VIOLETTA MASON MD; Attending Radiologist  This document has been electronically signed. Mar  4 2021  7:13PM  **

## 2021-03-06 LAB
A-TUMOR NECROSIS FACT SERPL-MCNC: 6.2 PG/ML — SIGNIFICANT CHANGE UP
ALBUMIN SERPL ELPH-MCNC: 2.5 G/DL — LOW (ref 3.3–5.2)
ALP SERPL-CCNC: 85 U/L — SIGNIFICANT CHANGE UP (ref 40–120)
ALT FLD-CCNC: 24 U/L — SIGNIFICANT CHANGE UP
ANION GAP SERPL CALC-SCNC: 16 MMOL/L — SIGNIFICANT CHANGE UP (ref 5–17)
AST SERPL-CCNC: 21 U/L — SIGNIFICANT CHANGE UP
BASOPHILS # BLD AUTO: 0.04 K/UL — SIGNIFICANT CHANGE UP (ref 0–0.2)
BASOPHILS NFR BLD AUTO: 0.2 % — SIGNIFICANT CHANGE UP (ref 0–2)
BILIRUB DIRECT SERPL-MCNC: 0.1 MG/DL — SIGNIFICANT CHANGE UP (ref 0–0.3)
BILIRUB INDIRECT FLD-MCNC: 0.2 MG/DL — SIGNIFICANT CHANGE UP (ref 0.2–1)
BILIRUB SERPL-MCNC: 0.3 MG/DL — LOW (ref 0.4–2)
BUN SERPL-MCNC: 23 MG/DL — HIGH (ref 8–20)
CALCIUM SERPL-MCNC: 8.4 MG/DL — LOW (ref 8.6–10.2)
CHLORIDE SERPL-SCNC: 96 MMOL/L — LOW (ref 98–107)
CO2 SERPL-SCNC: 22 MMOL/L — SIGNIFICANT CHANGE UP (ref 22–29)
CREAT SERPL-MCNC: 0.78 MG/DL — SIGNIFICANT CHANGE UP (ref 0.5–1.3)
CRP SERPL-MCNC: 117 MG/L — HIGH
D DIMER BLD IA.RAPID-MCNC: 977 NG/ML DDU — HIGH
EOSINOPHIL # BLD AUTO: 0.05 K/UL — SIGNIFICANT CHANGE UP (ref 0–0.5)
EOSINOPHIL NFR BLD AUTO: 0.2 % — SIGNIFICANT CHANGE UP (ref 0–6)
FERRITIN SERPL-MCNC: 598 NG/ML — HIGH (ref 30–400)
GAMMA INTERFERON BACKGROUND BLD IA-ACNC: 0.02 IU/ML — SIGNIFICANT CHANGE UP
GLUCOSE BLDC GLUCOMTR-MCNC: 103 MG/DL — HIGH (ref 70–99)
GLUCOSE BLDC GLUCOMTR-MCNC: 143 MG/DL — HIGH (ref 70–99)
GLUCOSE BLDC GLUCOMTR-MCNC: 159 MG/DL — HIGH (ref 70–99)
GLUCOSE BLDC GLUCOMTR-MCNC: 161 MG/DL — HIGH (ref 70–99)
GLUCOSE SERPL-MCNC: 114 MG/DL — HIGH (ref 70–99)
HCT VFR BLD CALC: 43.2 % — SIGNIFICANT CHANGE UP (ref 39–50)
HGB BLD-MCNC: 14 G/DL — SIGNIFICANT CHANGE UP (ref 13–17)
IL10 SERPL-MCNC: 9.1 PG/ML — HIGH
IL12 SERPL-MCNC: <1.9 PG/ML — SIGNIFICANT CHANGE UP
IL13 SERPL-MCNC: 18.1 PG/ML — HIGH
IL17A SERPL-MCNC: <1.4 PG/ML — SIGNIFICANT CHANGE UP
IL2 SERPL-MCNC: 1090.2 PG/ML — HIGH (ref 175.3–858.2)
IL2 SERPL-MCNC: <2.1 PG/ML — SIGNIFICANT CHANGE UP
IL4 SERPL-MCNC: <2.2 PG/ML — SIGNIFICANT CHANGE UP
IL6 SERPL-MCNC: 13.2 PG/ML — HIGH
IL8 SERPL-MCNC: <3 PG/ML — SIGNIFICANT CHANGE UP
IMM GRANULOCYTES NFR BLD AUTO: 1.7 % — HIGH (ref 0–1.5)
INR BLD: 1.43 RATIO — HIGH (ref 0.88–1.16)
INTERFERON GAMMA, BLOOD: <4.2 PG/ML — SIGNIFICANT CHANGE UP
INTERLEUKIN 1 BETA: <6.5 PG/ML — SIGNIFICANT CHANGE UP
INTERLEUKIN 5: <2.1 PG/ML — SIGNIFICANT CHANGE UP
LYMPHOCYTES # BLD AUTO: 0.7 K/UL — LOW (ref 1–3.3)
LYMPHOCYTES # BLD AUTO: 3.1 % — LOW (ref 13–44)
M TB IFN-G BLD-IMP: ABNORMAL
M TB IFN-G CD4+ BCKGRND COR BLD-ACNC: -0.01 IU/ML — SIGNIFICANT CHANGE UP
M TB IFN-G CD4+CD8+ BCKGRND COR BLD-ACNC: -0.01 IU/ML — SIGNIFICANT CHANGE UP
MAGNESIUM SERPL-MCNC: 2.1 MG/DL — SIGNIFICANT CHANGE UP (ref 1.6–2.6)
MCHC RBC-ENTMCNC: 29.4 PG — SIGNIFICANT CHANGE UP (ref 27–34)
MCHC RBC-ENTMCNC: 32.4 GM/DL — SIGNIFICANT CHANGE UP (ref 32–36)
MCV RBC AUTO: 90.8 FL — SIGNIFICANT CHANGE UP (ref 80–100)
MONOCYTES # BLD AUTO: 0.72 K/UL — SIGNIFICANT CHANGE UP (ref 0–0.9)
MONOCYTES NFR BLD AUTO: 3.1 % — SIGNIFICANT CHANGE UP (ref 2–14)
NEUTROPHILS # BLD AUTO: 21.01 K/UL — HIGH (ref 1.8–7.4)
NEUTROPHILS NFR BLD AUTO: 91.7 % — HIGH (ref 43–77)
PLATELET # BLD AUTO: 408 K/UL — HIGH (ref 150–400)
POTASSIUM SERPL-MCNC: 4.7 MMOL/L — SIGNIFICANT CHANGE UP (ref 3.5–5.3)
POTASSIUM SERPL-SCNC: 4.7 MMOL/L — SIGNIFICANT CHANGE UP (ref 3.5–5.3)
PROT SERPL-MCNC: 7.1 G/DL — SIGNIFICANT CHANGE UP (ref 6.6–8.7)
PROTHROM AB SERPL-ACNC: 16.3 SEC — HIGH (ref 10.6–13.6)
QUANT TB PLUS MITOGEN MINUS NIL: 0.01 IU/ML — SIGNIFICANT CHANGE UP
RBC # BLD: 4.76 M/UL — SIGNIFICANT CHANGE UP (ref 4.2–5.8)
RBC # FLD: 13.2 % — SIGNIFICANT CHANGE UP (ref 10.3–14.5)
SODIUM SERPL-SCNC: 134 MMOL/L — LOW (ref 135–145)
WBC # BLD: 22.92 K/UL — HIGH (ref 3.8–10.5)
WBC # FLD AUTO: 22.92 K/UL — HIGH (ref 3.8–10.5)

## 2021-03-06 PROCEDURE — 99233 SBSQ HOSP IP/OBS HIGH 50: CPT

## 2021-03-06 RX ORDER — ENOXAPARIN SODIUM 100 MG/ML
40 INJECTION SUBCUTANEOUS EVERY 12 HOURS
Refills: 0 | Status: DISCONTINUED | OUTPATIENT
Start: 2021-03-06 | End: 2021-03-28

## 2021-03-06 RX ADMIN — Medication 650 MILLIGRAM(S): at 20:23

## 2021-03-06 RX ADMIN — ENOXAPARIN SODIUM 40 MILLIGRAM(S): 100 INJECTION SUBCUTANEOUS at 05:29

## 2021-03-06 RX ADMIN — Medication 1 SPRAY(S): at 17:31

## 2021-03-06 RX ADMIN — REMDESIVIR 500 MILLIGRAM(S): 5 INJECTION INTRAVENOUS at 04:34

## 2021-03-06 RX ADMIN — ENOXAPARIN SODIUM 40 MILLIGRAM(S): 100 INJECTION SUBCUTANEOUS at 17:30

## 2021-03-06 RX ADMIN — FAMOTIDINE 20 MILLIGRAM(S): 10 INJECTION INTRAVENOUS at 17:31

## 2021-03-06 RX ADMIN — Medication 650 MILLIGRAM(S): at 10:50

## 2021-03-06 RX ADMIN — FAMOTIDINE 20 MILLIGRAM(S): 10 INJECTION INTRAVENOUS at 05:29

## 2021-03-06 RX ADMIN — Medication 650 MILLIGRAM(S): at 04:34

## 2021-03-06 RX ADMIN — Medication 6 MILLIGRAM(S): at 05:29

## 2021-03-06 RX ADMIN — Medication 1 SPRAY(S): at 05:30

## 2021-03-06 RX ADMIN — Medication 1: at 12:07

## 2021-03-06 RX ADMIN — Medication 650 MILLIGRAM(S): at 11:20

## 2021-03-06 NOTE — PROGRESS NOTE ADULT - SUBJECTIVE AND OBJECTIVE BOX
PULMONARY PROGRESS  NOTE    MARI  MRN   [id number]    56y/o male admitted  2/27/2021  with hypoxia pneumonia covid +      Events last 24 hours: ** feeling better   ctpa no PE ground glass changes  in bed   still intermittent cough -- tolerating high flow    *    ROS    CONSTITUTIONAL   no fevers  no loss of appetite  no weight loss   HEENT  no sore throat   no ringing in ears    RESPIRATORY  see HPI   CARDIOVASCULAR  no chest pain   GASTROINTESTINAL no vomiting  no diarrhea    no   gerd   MUSCULOSKELETAL  no joint pains    no  back pain   SKIN   no rash   no itchiness   GENITOURINARY  no dysuria   NEUROLOGIC  no tremors  no seizures  no    weakness    PSYCHIATRIC   no mood disorder       PAST MEDICAL & SURGICAL HISTORY:  Hypertension    No significant past surgical history          Medications:  remdesivir  IVPB 100 milliGRAM(s) IV Intermittent every 24 hours      budesonide 160 MICROgram(s)/formoterol 4.5 MICROgram(s) Inhaler 2 Puff(s) Inhalation two times a day  tiotropium 18 MICROgram(s) Capsule 1 Capsule(s) Inhalation daily    acetaminophen   Tablet .. 650 milliGRAM(s) Oral every 6 hours PRN  morphine  - Injectable 2 milliGRAM(s) IV Push every 4 hours PRN      enoxaparin Injectable 40 milliGRAM(s) SubCutaneous every 12 hours    famotidine Injectable 20 milliGRAM(s) IV Push two times a day      dexAMETHasone  Injectable 6 milliGRAM(s) IV Push daily  dextrose 40% Gel 15 Gram(s) Oral once  dextrose 50% Injectable 25 Gram(s) IV Push once  dextrose 50% Injectable 12.5 Gram(s) IV Push once  dextrose 50% Injectable 25 Gram(s) IV Push once  glucagon  Injectable 1 milliGRAM(s) IntraMuscular once  insulin lispro (ADMELOG) corrective regimen sliding scale   SubCutaneous three times a day before meals  insulin lispro (ADMELOG) corrective regimen sliding scale   SubCutaneous at bedtime    dextrose 5%. 1000 milliLiter(s) IV Continuous <Continuous>  dextrose 5%. 1000 milliLiter(s) IV Continuous <Continuous>      sodium chloride 0.65% Nasal 1 Spray(s) Both Nostrils two times a day            ICU Vital Signs Last 24 Hrs  T(C): 36.5 (06 Mar 2021 11:56), Max: 37.2 (06 Mar 2021 08:06)  T(F): 97.7 (06 Mar 2021 11:56), Max: 99 (06 Mar 2021 08:06)  HR: 71 (06 Mar 2021 11:56) (67 - 80)  BP: 113/72 (06 Mar 2021 11:56) (103/65 - 120/81)  BP(mean): 85 (06 Mar 2021 11:56) (85 - 85)  ABP: --  ABP(mean): --  RR: 25 (06 Mar 2021 11:56) (18 - 25)  SpO2: 97% (06 Mar 2021 11:56) (90% - 98%)      ABG - ( 05 Mar 2021 15:19 )  pH, Arterial: 7.44  pH, Blood: x     /  pCO2: 37    /  pO2: 66    / HCO3: 25    / Base Excess: 0.7   /  SaO2: 94                  I&O's Detail    05 Mar 2021 07:01  -  06 Mar 2021 07:00  --------------------------------------------------------  IN:  Total IN: 0 mL    OUT:    Voided (mL): 1000 mL  Total OUT: 1000 mL    Total NET: -1000 mL            LABS:                        14.0   22.92 )-----------( 408      ( 06 Mar 2021 08:43 )             43.2     03-06    134<L>  |  96<L>  |  23.0<H>  ----------------------------<  114<H>  4.7   |  22.0  |  0.78    Ca    8.4<L>      06 Mar 2021 08:43  Mg     2.1     03-06    TPro  7.1  /  Alb  2.5<L>  /  TBili  0.3<L>  /  DBili  0.1  /  AST  21  /  ALT  24  /  AlkPhos  85  03-06          CAPILLARY BLOOD GLUCOSE      POCT Blood Glucose.: 159 mg/dL (06 Mar 2021 12:01)    PT/INR - ( 06 Mar 2021 08:43 )   PT: 16.3 sec;   INR: 1.43 ratio             CULTURES:  Culture Results:   No growth at 48 hours (03-03 @ 22:19)  Culture Results:   No growth at 48 hours (03-03 @ 22:19)      Physical Examination:    General:  well built male no distress on high flow   HEENT: Pupils equal, reactive to light.  Symmetric.    PULM:  b/l air entry no    wheezing rales  rhonchi   NECK: Supple, no lymphadenopathy, trachea midline    CVS: Regular rate and rhythm, no murmurs, rubs, or gallops    ABD: Soft, nondistended, nontender, normoactive bowel sounds, no masses    EXT: No edema, nontender    SKIN: Warm and well perfused, no rashes noted.    NEURO: Alert, oriented, interactive, nonfocal        RADIOLOGY  :MPRESSION:   Persistent bilateral diffuse airspace disease..  HILDA RIOJAS MD; Attending Radiologist  This document has been electronically signed. Mar  6 2021  9:45AM     IMPRESSION:  1. No pulmonary embolism identified.  2. Diffuse confluent ground-glass opacities throughout both lungs, worse at the lower lobes    SOHEILA JASON MD; Attending Radiologist  This document has been electronically signed. Mar  6 2021 10:19AM***

## 2021-03-06 NOTE — PROGRESS NOTE ADULT - ASSESSMENT
54y/o male with :      1- acute hypoxic respiratory failure improving   2- covid- 19 pneumonia  3- possible sleep disorder  4- HTN     - remdesivir   - dexamethasone  - PPI  - saline nasal spray   - symbicort two inhalations bid   albuterol mdi prn   -tolerating HIGH FLOW  65 l/min   75%    Please  feel free to reach out with any questions or suggestions    LAUREN ALLEN    PULMONARY and CRITICAL CARE     [phone]

## 2021-03-06 NOTE — PROGRESS NOTE ADULT - SUBJECTIVE AND OBJECTIVE BOX
[employer] [employer] [facility] Division of Hospital Medicine    Chief Complaint:  SOB    SUBJECTIVE / OVERNIGHT EVENTS:  Pt examined lying comfortably in bed  Remains acutely hypoxic requiring HiFlo O2  Patient denies abd pain, N/V, fever, chills, dysuria or any other complaints. All remainder ROS negative.     MEDICATIONS  (STANDING):  budesonide 160 MICROgram(s)/formoterol 4.5 MICROgram(s) Inhaler 2 Puff(s) Inhalation two times a day  dexAMETHasone  Injectable 6 milliGRAM(s) IV Push daily  dextrose 40% Gel 15 Gram(s) Oral once  dextrose 5%. 1000 milliLiter(s) (50 mL/Hr) IV Continuous <Continuous>  dextrose 5%. 1000 milliLiter(s) (100 mL/Hr) IV Continuous <Continuous>  dextrose 50% Injectable 25 Gram(s) IV Push once  dextrose 50% Injectable 12.5 Gram(s) IV Push once  dextrose 50% Injectable 25 Gram(s) IV Push once  enoxaparin Injectable 85 milliGRAM(s) SubCutaneous every 12 hours  famotidine Injectable 20 milliGRAM(s) IV Push two times a day  glucagon  Injectable 1 milliGRAM(s) IntraMuscular once  insulin lispro (ADMELOG) corrective regimen sliding scale   SubCutaneous three times a day before meals  insulin lispro (ADMELOG) corrective regimen sliding scale   SubCutaneous at bedtime  remdesivir  IVPB 100 milliGRAM(s) IV Intermittent every 24 hours  sodium chloride 0.65% Nasal 1 Spray(s) Both Nostrils two times a day  tiotropium 18 MICROgram(s) Capsule 1 Capsule(s) Inhalation daily    MEDICATIONS  (PRN):  acetaminophen   Tablet .. 650 milliGRAM(s) Oral every 6 hours PRN Mild Pain (1 - 3), Moderate Pain (4 - 6)  morphine  - Injectable 2 milliGRAM(s) IV Push every 4 hours PRN tachypnea        I&O's Summary    04 Mar 2021 07:01  -  05 Mar 2021 07:00  --------------------------------------------------------  IN: 0 mL / OUT: 1300 mL / NET: -1300 mL        PHYSICAL EXAM:  Vital Signs Last 24 Hrs  T(C): 36.5 (06 Mar 2021 11:56), Max: 37.2 (06 Mar 2021 08:06)  T(F): 97.7 (06 Mar 2021 11:56), Max: 99 (06 Mar 2021 08:06)  HR: 71 (06 Mar 2021 11:56) (67 - 80)  BP: 113/72 (06 Mar 2021 11:56) (103/65 - 120/81)  BP(mean): 85 (06 Mar 2021 11:56) (85 - 85)  RR: 25 (06 Mar 2021 11:56) (18 - 25)  SpO2: 97% (06 Mar 2021 11:56) (90% - 98%)      CONSTITUTIONAL: NAD, On HiFlo   ENMT: Moist oral mucosa  RESPIRATORY: Normal respiratory effort; lungs with reduced BS bilaterally  CARDIOVASCULAR: Regular rate and rhythm, normal S1 and S2, No lower extremity edema  ABDOMEN: Nontender to palpation, normoactive bowel sounds  MUSCLOSKELETAL: no clubbing or cyanosis of digits; no joint swelling or tenderness to palpation  PSYCH: A+O to person, place, and time; affect appropriate  NEUROLOGY: CN 2-12 are intact and symmetric; no gross sensory deficits  SKIN: No rashes; no palpable lesions    LABS:                        14.0   22.92 )-----------( 408      ( 06 Mar 2021 08:43 )             43.2   03-06    134<L>  |  96<L>  |  23.0<H>  ----------------------------<  114<H>  4.7   |  22.0  |  0.78    Ca    8.4<L>      06 Mar 2021 08:43  Mg     2.1     03-06    TPro  7.1  /  Alb  2.5<L>  /  TBili  0.3<L>  /  DBili  0.1  /  AST  21  /  ALT  24  /  AlkPhos  85  03-06        CAPILLARY BLOOD GLUCOSE      POCT Blood Glucose.: 159 mg/dL (06 Mar 2021 12:01)  POCT Blood Glucose.: 103 mg/dL (06 Mar 2021 05:01)  POCT Blood Glucose.: 109 mg/dL (05 Mar 2021 21:13)  POCT Blood Glucose.: 105 mg/dL (05 Mar 2021 17:24)      3/6/21 CTA Chest  No pulmonary embolism identified.  Diffuse confluent ground-glass opacities throughout both lungs, worse at the lower lobes.   [facility] Division of Hospital Medicine    Chief Complaint:  SOB    SUBJECTIVE / OVERNIGHT EVENTS:  Pt examined lying comfortably in bed  Remains acutely hypoxic requiring HiFlo O2  Patient denies abd pain, N/V, fever, chills, dysuria or any other complaints. All remainder ROS negative.     MEDICATIONS  (STANDING):  budesonide 160 MICROgram(s)/formoterol 4.5 MICROgram(s) Inhaler 2 Puff(s) Inhalation two times a day  dexAMETHasone  Injectable 6 milliGRAM(s) IV Push daily  dextrose 40% Gel 15 Gram(s) Oral once  dextrose 5%. 1000 milliLiter(s) (50 mL/Hr) IV Continuous <Continuous>  dextrose 5%. 1000 milliLiter(s) (100 mL/Hr) IV Continuous <Continuous>  dextrose 50% Injectable 25 Gram(s) IV Push once  dextrose 50% Injectable 12.5 Gram(s) IV Push once  dextrose 50% Injectable 25 Gram(s) IV Push once  enoxaparin Injectable 85 milliGRAM(s) SubCutaneous every 12 hours  famotidine Injectable 20 milliGRAM(s) IV Push two times a day  glucagon  Injectable 1 milliGRAM(s) IntraMuscular once  insulin lispro (ADMELOG) corrective regimen sliding scale   SubCutaneous three times a day before meals  insulin lispro (ADMELOG) corrective regimen sliding scale   SubCutaneous at bedtime  remdesivir  IVPB 100 milliGRAM(s) IV Intermittent every 24 hours  sodium chloride 0.65% Nasal 1 Spray(s) Both Nostrils two times a day  tiotropium 18 MICROgram(s) Capsule 1 Capsule(s) Inhalation daily    MEDICATIONS  (PRN):  acetaminophen   Tablet .. 650 milliGRAM(s) Oral every 6 hours PRN Mild Pain (1 - 3), Moderate Pain (4 - 6)  morphine  - Injectable 2 milliGRAM(s) IV Push every 4 hours PRN tachypnea        I&O's Summary    04 Mar 2021 07:01  -  05 Mar 2021 07:00  --------------------------------------------------------  IN: 0 mL / OUT: 1300 mL / NET: -1300 mL        PHYSICAL EXAM:  Vital Signs Last 24 Hrs  T(C): 36.5 (06 Mar 2021 11:56), Max: 37.2 (06 Mar 2021 08:06)  T(F): 97.7 (06 Mar 2021 11:56), Max: 99 (06 Mar 2021 08:06)  HR: 71 (06 Mar 2021 11:56) (67 - 80)  BP: 113/72 (06 Mar 2021 11:56) (103/65 - 120/81)  BP(mean): 85 (06 Mar 2021 11:56) (85 - 85)  RR: 25 (06 Mar 2021 11:56) (18 - 25)  SpO2: 97% (06 Mar 2021 11:56) (90% - 98%)      CONSTITUTIONAL: NAD, On HiFlo   ENMT: Moist oral mucosa  RESPIRATORY: Normal respiratory effort; lungs with reduced BS bilaterally  CARDIOVASCULAR: Regular rate and rhythm, normal S1 and S2, No lower extremity edema  ABDOMEN: Nontender to palpation, normoactive bowel sounds  MUSCLOSKELETAL: no clubbing or cyanosis of digits; no joint swelling or tenderness to palpation  PSYCH: A+O to person, place, and time; affect appropriate  NEUROLOGY: CN 2-12 are intact and symmetric; no gross sensory deficits  SKIN: No rashes; no palpable lesions    LABS:                        14.0   22.92 )-----------( 408      ( 06 Mar 2021 08:43 )             43.2   03-06    134<L>  |  96<L>  |  23.0<H>  ----------------------------<  114<H>  4.7   |  22.0  |  0.78    Ca    8.4<L>      06 Mar 2021 08:43  Mg     2.1     03-06    TPro  7.1  /  Alb  2.5<L>  /  TBili  0.3<L>  /  DBili  0.1  /  AST  21  /  ALT  24  /  AlkPhos  85  03-06    Procalcitonin : 0.25>0.28>0.51  Ferritin : 659>513>598    CAPILLARY BLOOD GLUCOSE      POCT Blood Glucose.: 159 mg/dL (06 Mar 2021 12:01)  POCT Blood Glucose.: 103 mg/dL (06 Mar 2021 05:01)  POCT Blood Glucose.: 109 mg/dL (05 Mar 2021 21:13)  POCT Blood Glucose.: 105 mg/dL (05 Mar 2021 17:24)      3/6/21 CTA Chest  No pulmonary embolism identified.  Diffuse confluent ground-glass opacities throughout both lungs, worse at the lower lobes.

## 2021-03-06 NOTE — PROGRESS NOTE ADULT - ASSESSMENT
55y/oM PMH HTN, HLD, DM presenting with SOB, admitted with hypoxic respiratory failure to ICU, placed on Bipap. Weaned to HFNC and downgraded 3/1.     Acute hypoxic respiratory failure 2/2 COVID-19 PNA   Elevated d-dimer  Remains acutely hypoxic requiring Hiflo  Will attempt to titarte down as tolerated   cont remdesivir, dexamethasone (Day 6)  ID recs appreciated   Encouraged IS, proning   Trend inflammatory markers, ordered for AM    pt not candidate for Actemra   D-dimer Up trending   LE duplex neg for DVT, CTA neg for PE   Pulmonary consult appreciated  CCM eval appreciated      HTN  Normotensive, continue holding lisinopril for now  Monitor BP     Prediabetes   HgbA1c 6   cont ISS     Obesity, BMI 32    DVT ppx: lovenox       55y/oM PMH HTN, HLD, DM presenting with SOB, admitted with hypoxic respiratory failure to ICU, placed on Bipap. Weaned to HFNC and downgraded 3/1.     Acute hypoxic respiratory failure 2/2 COVID-19 PNA   Elevated d-dimer  Remains acutely hypoxic requiring Hiflo  Will attempt to titarte down as tolerated   cont remdesivir, dexamethasone (Day 8)  ID recs appreciated   Encouraged IS, proning   Trend inflammatory markers, ordered for AM    pt not candidate for Actemra   D-dimer Up trending   LE duplex neg for DVT, CTA neg for PE   Pulmonary consult appreciated  CCM eval appreciated      HTN  Normotensive, continue holding lisinopril for now  Monitor BP     Prediabetes   HgbA1c 6   cont ISS     Obesity, BMI 32    DVT ppx: lovenox

## 2021-03-07 LAB
ALBUMIN SERPL ELPH-MCNC: 2.5 G/DL — LOW (ref 3.3–5.2)
ALBUMIN SERPL ELPH-MCNC: 2.5 G/DL — LOW (ref 3.3–5.2)
ALP SERPL-CCNC: 94 U/L — SIGNIFICANT CHANGE UP (ref 40–120)
ALP SERPL-CCNC: 94 U/L — SIGNIFICANT CHANGE UP (ref 40–120)
ALT FLD-CCNC: 18 U/L — SIGNIFICANT CHANGE UP
ALT FLD-CCNC: 18 U/L — SIGNIFICANT CHANGE UP
ANION GAP SERPL CALC-SCNC: 14 MMOL/L — SIGNIFICANT CHANGE UP (ref 5–17)
AST SERPL-CCNC: 24 U/L — SIGNIFICANT CHANGE UP
AST SERPL-CCNC: 24 U/L — SIGNIFICANT CHANGE UP
BASOPHILS # BLD AUTO: 0.05 K/UL — SIGNIFICANT CHANGE UP (ref 0–0.2)
BASOPHILS NFR BLD AUTO: 0.2 % — SIGNIFICANT CHANGE UP (ref 0–2)
BILIRUB DIRECT SERPL-MCNC: 0.1 MG/DL — SIGNIFICANT CHANGE UP (ref 0–0.3)
BILIRUB INDIRECT FLD-MCNC: 0.3 MG/DL — SIGNIFICANT CHANGE UP (ref 0.2–1)
BILIRUB SERPL-MCNC: 0.4 MG/DL — SIGNIFICANT CHANGE UP (ref 0.4–2)
BILIRUB SERPL-MCNC: 0.4 MG/DL — SIGNIFICANT CHANGE UP (ref 0.4–2)
BUN SERPL-MCNC: 20 MG/DL — SIGNIFICANT CHANGE UP (ref 8–20)
CALCIUM SERPL-MCNC: 8.4 MG/DL — LOW (ref 8.6–10.2)
CHLORIDE SERPL-SCNC: 95 MMOL/L — LOW (ref 98–107)
CO2 SERPL-SCNC: 24 MMOL/L — SIGNIFICANT CHANGE UP (ref 22–29)
CREAT SERPL-MCNC: 0.8 MG/DL — SIGNIFICANT CHANGE UP (ref 0.5–1.3)
EOSINOPHIL # BLD AUTO: 0.02 K/UL — SIGNIFICANT CHANGE UP (ref 0–0.5)
EOSINOPHIL NFR BLD AUTO: 0.1 % — SIGNIFICANT CHANGE UP (ref 0–6)
GLUCOSE BLDC GLUCOMTR-MCNC: 102 MG/DL — HIGH (ref 70–99)
GLUCOSE BLDC GLUCOMTR-MCNC: 119 MG/DL — HIGH (ref 70–99)
GLUCOSE BLDC GLUCOMTR-MCNC: 119 MG/DL — HIGH (ref 70–99)
GLUCOSE BLDC GLUCOMTR-MCNC: 149 MG/DL — HIGH (ref 70–99)
GLUCOSE SERPL-MCNC: 134 MG/DL — HIGH (ref 70–99)
HCT VFR BLD CALC: 44.3 % — SIGNIFICANT CHANGE UP (ref 39–50)
HGB BLD-MCNC: 14.4 G/DL — SIGNIFICANT CHANGE UP (ref 13–17)
IMM GRANULOCYTES NFR BLD AUTO: 1.5 % — SIGNIFICANT CHANGE UP (ref 0–1.5)
INR BLD: 1.47 RATIO — HIGH (ref 0.88–1.16)
LYMPHOCYTES # BLD AUTO: 0.91 K/UL — LOW (ref 1–3.3)
LYMPHOCYTES # BLD AUTO: 3.9 % — LOW (ref 13–44)
MCHC RBC-ENTMCNC: 29.5 PG — SIGNIFICANT CHANGE UP (ref 27–34)
MCHC RBC-ENTMCNC: 32.5 GM/DL — SIGNIFICANT CHANGE UP (ref 32–36)
MCV RBC AUTO: 90.8 FL — SIGNIFICANT CHANGE UP (ref 80–100)
MONOCYTES # BLD AUTO: 0.67 K/UL — SIGNIFICANT CHANGE UP (ref 0–0.9)
MONOCYTES NFR BLD AUTO: 2.9 % — SIGNIFICANT CHANGE UP (ref 2–14)
NEUTROPHILS # BLD AUTO: 21.28 K/UL — HIGH (ref 1.8–7.4)
NEUTROPHILS NFR BLD AUTO: 91.4 % — HIGH (ref 43–77)
PLATELET # BLD AUTO: 506 K/UL — HIGH (ref 150–400)
POTASSIUM SERPL-MCNC: 5.1 MMOL/L — SIGNIFICANT CHANGE UP (ref 3.5–5.3)
POTASSIUM SERPL-SCNC: 5.1 MMOL/L — SIGNIFICANT CHANGE UP (ref 3.5–5.3)
PROT SERPL-MCNC: 7.2 G/DL — SIGNIFICANT CHANGE UP (ref 6.6–8.7)
PROT SERPL-MCNC: 7.2 G/DL — SIGNIFICANT CHANGE UP (ref 6.6–8.7)
PROTHROM AB SERPL-ACNC: 16.7 SEC — HIGH (ref 10.6–13.6)
RBC # BLD: 4.88 M/UL — SIGNIFICANT CHANGE UP (ref 4.2–5.8)
RBC # FLD: 13.1 % — SIGNIFICANT CHANGE UP (ref 10.3–14.5)
SODIUM SERPL-SCNC: 133 MMOL/L — LOW (ref 135–145)
WBC # BLD: 23.27 K/UL — HIGH (ref 3.8–10.5)
WBC # FLD AUTO: 23.27 K/UL — HIGH (ref 3.8–10.5)

## 2021-03-07 PROCEDURE — 99233 SBSQ HOSP IP/OBS HIGH 50: CPT

## 2021-03-07 RX ADMIN — FAMOTIDINE 20 MILLIGRAM(S): 10 INJECTION INTRAVENOUS at 18:23

## 2021-03-07 RX ADMIN — TIOTROPIUM BROMIDE 1 CAPSULE(S): 18 CAPSULE ORAL; RESPIRATORY (INHALATION) at 12:40

## 2021-03-07 RX ADMIN — BUDESONIDE AND FORMOTEROL FUMARATE DIHYDRATE 2 PUFF(S): 160; 4.5 AEROSOL RESPIRATORY (INHALATION) at 20:08

## 2021-03-07 RX ADMIN — Medication 1 SPRAY(S): at 18:24

## 2021-03-07 RX ADMIN — Medication 6 MILLIGRAM(S): at 05:01

## 2021-03-07 RX ADMIN — Medication 650 MILLIGRAM(S): at 02:37

## 2021-03-07 RX ADMIN — FAMOTIDINE 20 MILLIGRAM(S): 10 INJECTION INTRAVENOUS at 05:01

## 2021-03-07 RX ADMIN — ENOXAPARIN SODIUM 40 MILLIGRAM(S): 100 INJECTION SUBCUTANEOUS at 05:01

## 2021-03-07 RX ADMIN — Medication 1 SPRAY(S): at 05:01

## 2021-03-07 RX ADMIN — ENOXAPARIN SODIUM 40 MILLIGRAM(S): 100 INJECTION SUBCUTANEOUS at 18:24

## 2021-03-07 RX ADMIN — BUDESONIDE AND FORMOTEROL FUMARATE DIHYDRATE 2 PUFF(S): 160; 4.5 AEROSOL RESPIRATORY (INHALATION) at 12:40

## 2021-03-07 RX ADMIN — REMDESIVIR 500 MILLIGRAM(S): 5 INJECTION INTRAVENOUS at 04:44

## 2021-03-07 NOTE — PROGRESS NOTE ADULT - ASSESSMENT
55y/oM PMH HTN, HLD, DM presenting with SOB, admitted with hypoxic respiratory failure to ICU, placed on Bipap. Weaned to HFNC and downgraded 3/1.     Acute hypoxic respiratory failure 2/2 COVID-19 PNA   Elevated d-dimer  Remains acutely hypoxic requiring Hiflo  Will attempt to titarte down as tolerated   cont remdesivir, dexamethasone (Day 7)  ID recs appreciated   Encouraged IS, proning   Trend inflammatory markers, ordered for AM    pt not candidate for Actemra   D-dimer Up trending   LE duplex neg for DVT, CTA neg for PE   Pulmonary consult appreciated  CCM eval appreciated      HTN  Normotensive, continue holding lisinopril for now  Monitor BP     Prediabetes   HgbA1c 6   cont ISS     Obesity, BMI 32    DVT ppx: lovenox

## 2021-03-07 NOTE — PROGRESS NOTE ADULT - SUBJECTIVE AND OBJECTIVE BOX
[facility] Division of Hospital Medicine    Chief Complaint:  SOB    SUBJECTIVE / OVERNIGHT EVENTS:  Pt examined lying comfortably in bed  Was taken down to 70% FiO2 via hiflo yesterday but became hypoxic again overnight prompting increase back to 100%  Currently comfortable, saturating 97-98%.   Patient denies abd pain, N/V, fever, chills, dysuria or any other complaints. All remainder ROS negative.     MEDICATIONS  (STANDING):  budesonide 160 MICROgram(s)/formoterol 4.5 MICROgram(s) Inhaler 2 Puff(s) Inhalation two times a day  dexAMETHasone  Injectable 6 milliGRAM(s) IV Push daily  dextrose 40% Gel 15 Gram(s) Oral once  dextrose 5%. 1000 milliLiter(s) (50 mL/Hr) IV Continuous <Continuous>  dextrose 5%. 1000 milliLiter(s) (100 mL/Hr) IV Continuous <Continuous>  dextrose 50% Injectable 25 Gram(s) IV Push once  dextrose 50% Injectable 12.5 Gram(s) IV Push once  dextrose 50% Injectable 25 Gram(s) IV Push once  enoxaparin Injectable 40 milliGRAM(s) SubCutaneous every 12 hours  famotidine Injectable 20 milliGRAM(s) IV Push two times a day  glucagon  Injectable 1 milliGRAM(s) IntraMuscular once  insulin lispro (ADMELOG) corrective regimen sliding scale   SubCutaneous three times a day before meals  insulin lispro (ADMELOG) corrective regimen sliding scale   SubCutaneous at bedtime  remdesivir  IVPB 100 milliGRAM(s) IV Intermittent every 24 hours  sodium chloride 0.65% Nasal 1 Spray(s) Both Nostrils two times a day  tiotropium 18 MICROgram(s) Capsule 1 Capsule(s) Inhalation daily    MEDICATIONS  (PRN):  acetaminophen   Tablet .. 650 milliGRAM(s) Oral every 6 hours PRN Mild Pain (1 - 3), Moderate Pain (4 - 6)  morphine  - Injectable 2 milliGRAM(s) IV Push every 4 hours PRN tachypnea      I&O's Summary    06 Mar 2021 07:01  -  07 Mar 2021 07:00  --------------------------------------------------------  IN: 1150 mL / OUT: 275 mL / NET: 875 mL    07 Mar 2021 07:01  -  07 Mar 2021 10:51  --------------------------------------------------------  IN: 150 mL / OUT: 300 mL / NET: -150 mL            PHYSICAL EXAM:  Vital Signs Last 24 Hrs  T(C): 37.2 (07 Mar 2021 08:05), Max: 37.7 (06 Mar 2021 20:20)  T(F): 98.9 (07 Mar 2021 08:05), Max: 99.8 (06 Mar 2021 20:20)  HR: 88 (07 Mar 2021 08:05) (71 - 88)  BP: 111/73 (07 Mar 2021 08:05) (105/66 - 120/74)  BP(mean): 85 (06 Mar 2021 11:56) (85 - 85)  RR: 22 (07 Mar 2021 08:05) (21 - 25)  SpO2: 91% (07 Mar 2021 08:05) (87% - 97%)      CONSTITUTIONAL: NAD, On HiFlo   ENMT: Moist oral mucosa  RESPIRATORY: Normal respiratory effort; lungs with reduced BS bilaterally  CARDIOVASCULAR: Regular rate and rhythm, normal S1 and S2, No lower extremity edema  ABDOMEN: Nontender to palpation, normoactive bowel sounds  MUSCLOSKELETAL: no clubbing or cyanosis of digits; no joint swelling or tenderness to palpation  PSYCH: A+O to person, place, and time; affect appropriate  NEUROLOGY: CN 2-12 are intact and symmetric; no gross sensory deficits  SKIN: No rashes; no palpable lesions    LABS:                                   14.4   23.27 )-----------( 506      ( 07 Mar 2021 10:07 )             44.3   03-07    133<L>  |  95<L>  |  20.0  ----------------------------<  134<H>  5.1   |  24.0  |  0.80    Ca    8.4<L>      07 Mar 2021 10:07  Mg     2.1     03-06    TPro  7.2  /  Alb  2.5<L>  /  TBili  0.4  /  DBili  0.1  /  AST  24  /  ALT  18  /  AlkPhos  94  03-07          CAPILLARY BLOOD GLUCOSE      POCT Blood Glucose.: 102 mg/dL (07 Mar 2021 05:00)  POCT Blood Glucose.: 161 mg/dL (06 Mar 2021 20:01)  POCT Blood Glucose.: 143 mg/dL (06 Mar 2021 17:28)  POCT Blood Glucose.: 159 mg/dL (06 Mar 2021 12:01)        3/6/21 CTA Chest  No pulmonary embolism identified.  Diffuse confluent ground-glass opacities throughout both lungs, worse at the lower lobes.

## 2021-03-08 LAB
ALBUMIN SERPL ELPH-MCNC: 2.4 G/DL — LOW (ref 3.3–5.2)
ALBUMIN SERPL ELPH-MCNC: 2.4 G/DL — LOW (ref 3.3–5.2)
ALP SERPL-CCNC: 88 U/L — SIGNIFICANT CHANGE UP (ref 40–120)
ALP SERPL-CCNC: 88 U/L — SIGNIFICANT CHANGE UP (ref 40–120)
ALT FLD-CCNC: 19 U/L — SIGNIFICANT CHANGE UP
ALT FLD-CCNC: 19 U/L — SIGNIFICANT CHANGE UP
ANION GAP SERPL CALC-SCNC: 14 MMOL/L — SIGNIFICANT CHANGE UP (ref 5–17)
AST SERPL-CCNC: 26 U/L — SIGNIFICANT CHANGE UP
AST SERPL-CCNC: 26 U/L — SIGNIFICANT CHANGE UP
BASOPHILS # BLD AUTO: 0.03 K/UL — SIGNIFICANT CHANGE UP (ref 0–0.2)
BASOPHILS NFR BLD AUTO: 0.1 % — SIGNIFICANT CHANGE UP (ref 0–2)
BILIRUB DIRECT SERPL-MCNC: 0.1 MG/DL — SIGNIFICANT CHANGE UP (ref 0–0.3)
BILIRUB INDIRECT FLD-MCNC: 0.2 MG/DL — SIGNIFICANT CHANGE UP (ref 0.2–1)
BILIRUB SERPL-MCNC: 0.3 MG/DL — LOW (ref 0.4–2)
BILIRUB SERPL-MCNC: 0.3 MG/DL — LOW (ref 0.4–2)
BUN SERPL-MCNC: 23 MG/DL — HIGH (ref 8–20)
CALCIUM SERPL-MCNC: 8.3 MG/DL — LOW (ref 8.6–10.2)
CHLORIDE SERPL-SCNC: 94 MMOL/L — LOW (ref 98–107)
CO2 SERPL-SCNC: 23 MMOL/L — SIGNIFICANT CHANGE UP (ref 22–29)
CREAT SERPL-MCNC: 0.81 MG/DL — SIGNIFICANT CHANGE UP (ref 0.5–1.3)
CRP SERPL HS-MCNC: 114.31 MG/L — HIGH (ref 0–3)
CULTURE RESULTS: SIGNIFICANT CHANGE UP
CULTURE RESULTS: SIGNIFICANT CHANGE UP
EOSINOPHIL # BLD AUTO: 0.07 K/UL — SIGNIFICANT CHANGE UP (ref 0–0.5)
EOSINOPHIL NFR BLD AUTO: 0.3 % — SIGNIFICANT CHANGE UP (ref 0–6)
GLUCOSE BLDC GLUCOMTR-MCNC: 112 MG/DL — HIGH (ref 70–99)
GLUCOSE BLDC GLUCOMTR-MCNC: 118 MG/DL — HIGH (ref 70–99)
GLUCOSE BLDC GLUCOMTR-MCNC: 136 MG/DL — HIGH (ref 70–99)
GLUCOSE BLDC GLUCOMTR-MCNC: 192 MG/DL — HIGH (ref 70–99)
GLUCOSE BLDC GLUCOMTR-MCNC: 196 MG/DL — HIGH (ref 70–99)
GLUCOSE SERPL-MCNC: 90 MG/DL — SIGNIFICANT CHANGE UP (ref 70–99)
HCT VFR BLD CALC: 41.9 % — SIGNIFICANT CHANGE UP (ref 39–50)
HGB BLD-MCNC: 13.8 G/DL — SIGNIFICANT CHANGE UP (ref 13–17)
IMM GRANULOCYTES NFR BLD AUTO: 1.1 % — SIGNIFICANT CHANGE UP (ref 0–1.5)
INR BLD: 1.36 RATIO — HIGH (ref 0.88–1.16)
LDH SERPL L TO P-CCNC: 485 U/L — HIGH (ref 98–192)
LYMPHOCYTES # BLD AUTO: 1.05 K/UL — SIGNIFICANT CHANGE UP (ref 1–3.3)
LYMPHOCYTES # BLD AUTO: 4.5 % — LOW (ref 13–44)
MCHC RBC-ENTMCNC: 29.6 PG — SIGNIFICANT CHANGE UP (ref 27–34)
MCHC RBC-ENTMCNC: 32.9 GM/DL — SIGNIFICANT CHANGE UP (ref 32–36)
MCV RBC AUTO: 89.9 FL — SIGNIFICANT CHANGE UP (ref 80–100)
MONOCYTES # BLD AUTO: 0.93 K/UL — HIGH (ref 0–0.9)
MONOCYTES NFR BLD AUTO: 4 % — SIGNIFICANT CHANGE UP (ref 2–14)
NEUTROPHILS # BLD AUTO: 20.88 K/UL — HIGH (ref 1.8–7.4)
NEUTROPHILS NFR BLD AUTO: 90 % — HIGH (ref 43–77)
PLATELET # BLD AUTO: 430 K/UL — HIGH (ref 150–400)
POTASSIUM SERPL-MCNC: 4.3 MMOL/L — SIGNIFICANT CHANGE UP (ref 3.5–5.3)
POTASSIUM SERPL-SCNC: 4.3 MMOL/L — SIGNIFICANT CHANGE UP (ref 3.5–5.3)
PROCALCITONIN SERPL-MCNC: 0.5 NG/ML — HIGH (ref 0.02–0.1)
PROT SERPL-MCNC: 6.6 G/DL — SIGNIFICANT CHANGE UP (ref 6.6–8.7)
PROT SERPL-MCNC: 6.6 G/DL — SIGNIFICANT CHANGE UP (ref 6.6–8.7)
PROTHROM AB SERPL-ACNC: 15.6 SEC — HIGH (ref 10.6–13.6)
RBC # BLD: 4.66 M/UL — SIGNIFICANT CHANGE UP (ref 4.2–5.8)
RBC # FLD: 12.9 % — SIGNIFICANT CHANGE UP (ref 10.3–14.5)
SODIUM SERPL-SCNC: 131 MMOL/L — LOW (ref 135–145)
SPECIMEN SOURCE: SIGNIFICANT CHANGE UP
SPECIMEN SOURCE: SIGNIFICANT CHANGE UP
WBC # BLD: 23.21 K/UL — HIGH (ref 3.8–10.5)
WBC # FLD AUTO: 23.21 K/UL — HIGH (ref 3.8–10.5)

## 2021-03-08 PROCEDURE — 99232 SBSQ HOSP IP/OBS MODERATE 35: CPT

## 2021-03-08 PROCEDURE — 99233 SBSQ HOSP IP/OBS HIGH 50: CPT

## 2021-03-08 RX ADMIN — BUDESONIDE AND FORMOTEROL FUMARATE DIHYDRATE 2 PUFF(S): 160; 4.5 AEROSOL RESPIRATORY (INHALATION) at 19:18

## 2021-03-08 RX ADMIN — ENOXAPARIN SODIUM 40 MILLIGRAM(S): 100 INJECTION SUBCUTANEOUS at 05:01

## 2021-03-08 RX ADMIN — FAMOTIDINE 20 MILLIGRAM(S): 10 INJECTION INTRAVENOUS at 18:10

## 2021-03-08 RX ADMIN — Medication 1 SPRAY(S): at 18:10

## 2021-03-08 RX ADMIN — FAMOTIDINE 20 MILLIGRAM(S): 10 INJECTION INTRAVENOUS at 05:01

## 2021-03-08 RX ADMIN — Medication 1 SPRAY(S): at 05:01

## 2021-03-08 RX ADMIN — TIOTROPIUM BROMIDE 1 CAPSULE(S): 18 CAPSULE ORAL; RESPIRATORY (INHALATION) at 08:35

## 2021-03-08 RX ADMIN — BUDESONIDE AND FORMOTEROL FUMARATE DIHYDRATE 2 PUFF(S): 160; 4.5 AEROSOL RESPIRATORY (INHALATION) at 08:35

## 2021-03-08 RX ADMIN — ENOXAPARIN SODIUM 40 MILLIGRAM(S): 100 INJECTION SUBCUTANEOUS at 18:09

## 2021-03-08 RX ADMIN — REMDESIVIR 500 MILLIGRAM(S): 5 INJECTION INTRAVENOUS at 04:35

## 2021-03-08 RX ADMIN — Medication 6 MILLIGRAM(S): at 05:00

## 2021-03-08 RX ADMIN — Medication 1: at 11:35

## 2021-03-08 NOTE — PROGRESS NOTE ADULT - SUBJECTIVE AND OBJECTIVE BOX
[facility] Division of Hospital Medicine    Chief Complaint:  SOB    SUBJECTIVE / OVERNIGHT EVENTS:  Pt examined sitting up comfortably in chair  Mildly dyspneic when conversing (using language line)  Patient denies abd pain, N/V, fever, chills, dysuria or any other complaints. All remainder ROS negative.     MEDICATIONS  (STANDING):  budesonide 160 MICROgram(s)/formoterol 4.5 MICROgram(s) Inhaler 2 Puff(s) Inhalation two times a day  dextrose 40% Gel 15 Gram(s) Oral once  dextrose 5%. 1000 milliLiter(s) (50 mL/Hr) IV Continuous <Continuous>  dextrose 5%. 1000 milliLiter(s) (100 mL/Hr) IV Continuous <Continuous>  dextrose 50% Injectable 25 Gram(s) IV Push once  dextrose 50% Injectable 12.5 Gram(s) IV Push once  dextrose 50% Injectable 25 Gram(s) IV Push once  enoxaparin Injectable 40 milliGRAM(s) SubCutaneous every 12 hours  famotidine Injectable 20 milliGRAM(s) IV Push two times a day  glucagon  Injectable 1 milliGRAM(s) IntraMuscular once  insulin lispro (ADMELOG) corrective regimen sliding scale   SubCutaneous three times a day before meals  insulin lispro (ADMELOG) corrective regimen sliding scale   SubCutaneous at bedtime  remdesivir  IVPB 100 milliGRAM(s) IV Intermittent every 24 hours  sodium chloride 0.65% Nasal 1 Spray(s) Both Nostrils two times a day  tiotropium 18 MICROgram(s) Capsule 1 Capsule(s) Inhalation daily    MEDICATIONS  (PRN):  acetaminophen   Tablet .. 650 milliGRAM(s) Oral every 6 hours PRN Mild Pain (1 - 3), Moderate Pain (4 - 6)        PHYSICAL EXAM:  Vital Signs Last 24 Hrs  T(C): 37.1 (08 Mar 2021 08:05), Max: 37.5 (08 Mar 2021 04:15)  T(F): 98.8 (08 Mar 2021 08:05), Max: 99.5 (08 Mar 2021 04:15)  HR: 79 (08 Mar 2021 08:35) (78 - 91)  BP: 120/75 (08 Mar 2021 08:05) (103/67 - 120/75)  BP(mean): --  RR: 18 (08 Mar 2021 08:05) (18 - 22)  SpO2: 97% (08 Mar 2021 08:35) (88% - 97%)      CONSTITUTIONAL: NAD, On HiFlo   ENMT: Moist oral mucosa  RESPIRATORY: Normal respiratory effort; lungs with reduced BS bilaterally  CARDIOVASCULAR: Regular rate and rhythm, normal S1 and S2, No lower extremity edema  ABDOMEN: Nontender to palpation, normoactive bowel sounds  MUSCLOSKELETAL: no clubbing or cyanosis of digits; no joint swelling or tenderness to palpation  PSYCH: A+O to person, place, and time; affect appropriate  NEUROLOGY: CN 2-12 are intact and symmetric; no gross sensory deficits  SKIN: No rashes; no palpable lesions    LABS:                                              13.8   23.21 )-----------( 430      ( 08 Mar 2021 05:57 )             41.9   03-08    131<L>  |  94<L>  |  23.0<H>  ----------------------------<  90  4.3   |  23.0  |  0.81    Ca    8.3<L>      08 Mar 2021 05:57    TPro  6.6  /  Alb  2.4<L>  /  TBili  0.3<L>  /  DBili  0.1  /  AST  26  /  ALT  19  /  AlkPhos  88  03-08        CAPILLARY BLOOD GLUCOSE      POCT Blood Glucose.: 112 mg/dL (08 Mar 2021 05:03)  POCT Blood Glucose.: 119 mg/dL (07 Mar 2021 20:31)  POCT Blood Glucose.: 119 mg/dL (07 Mar 2021 16:57)  POCT Blood Glucose.: 149 mg/dL (07 Mar 2021 11:05)    LDH : 485  Procalcitonin : 0.25 > 0.28 > 0.51 > 0.50  Ferritin : 659 > 513 > 598  D-Dimer : 224 > 460 > 561 > 549 > 765 > 977      Pertinent Imaging    2/28/21 US LE b/l  No evidence of deep venous thrombosis in either lower extremity.    3/5/21 CXR :   Persistent bilateral diffuse airspace disease..    3/6/21 CTA Chest  No pulmonary embolism identified.  Diffuse confluent ground-glass opacities throughout both lungs, worse at the lower lobes.

## 2021-03-08 NOTE — PROGRESS NOTE ADULT - ASSESSMENT
55y  Male with h/o HTN. Patient presented with shortness of breath. His symptoms began 2/22 with cough, subjective fevers, myalgias, nausea, and SOB. He went to urgent care and was found to be covid + on 2/22. Since his symptoms have gotten worse. He called EMS jose alfredo worsening SOB. EMS found him with sats of 50s on room air, brought him to the ED and was placed on BIPAP for increase work of breathing. O2 Sats improved but RR did not. Patint was admitted to MICU for COVID 19 PNA and respiratory failure.      Acute Hypoxic respiratory failure  COVID-19 infection  B/L Pneumonia   cough  shortness of breath      - COVID 19 PCR positive   - Continue supportive care measures  - continue to trend inflammatory markers.   - trend CBC with diff, CMP,  CRP, Ferritin,  procalcitonin q 48hours  - Avoid antibiotics unless there is a concern for a bacterial infection  - May consider vitamin C, thiamine and zinc (note lack of evidence to support benefit with COVID 19)  - Continue Remdesivir 200mg IV x1 followed by 100mg IV daily to complete 10 days 3/9  - Patient is not a candidate for Actemra based on [facility] COVID guidelines   - Dexamethasone 6mg Daily while on Remdesivir   - follow up all outstanding cultures  - Trend Fever  - Trend Leukocytosis    Will sign off. please call PRN.

## 2021-03-08 NOTE — PROGRESS NOTE ADULT - SUBJECTIVE AND OBJECTIVE BOX
[facility]  INFECTIOUS DISEASES AND INTERNAL MEDICINE at [place]  =======================================================  Domingo Monsivais MD  Diplomates American Board of Internal Medicine and Infectious Diseases  Tel: [phone]      Fax: [phone]  =======================================================    BECKY GUERRA [id number]    Follow up: COVID 19    Now on HFNC      Allergies:  No Known Allergies      REVIEW OF SYSTEMS:  CONSTITUTIONAL:  No Fever or chills  HEENT:   No diplopia or blurred vision.  No earache, sore throat or runny nose.  CARDIOVASCULAR:  No pressure, squeezing, strangling, tightness, heaviness or aching about the chest, neck, axilla or epigastrium.  RESPIRATORY:  + cough, + shortness of breath  GASTROINTESTINAL:  No nausea, vomiting or diarrhea.  GENITOURINARY:  No dysuria, frequency or urgency.  MUSCULOSKELETAL:  no joint aches, no muscle pain  SKIN:  No change in skin, hair or nails.  NEUROLOGIC:  No Headaches, seizures   PSYCHIATRIC:  No disorder of thought or mood.  ENDOCRINE:  No heat or cold intolerance  HEMATOLOGICAL:  No easy bruising or bleeding.       Physical Exam:  GEN: NAD  HEENT: normocephalic and atraumatic. EOMI. PERRL.  Anicteric   NECK: Supple.   LUNGS: Coarse BS B/L  HEART: Regular rate and rhythm  ABDOMEN: Soft, nontender, and nondistended.  Positive bowel sounds.    : No CVA tenderness  EXTREMITIES: Without any edema.  MSK: no joint swelling  NEUROLOGIC: No focal deficits  PSYCHIATRIC: Appropriate affect .  SKIN: No Rash      Vitals:  T(F): 98.8 (08 Mar 2021 08:05), Max: 99.5 (08 Mar 2021 04:15)  HR: 79 (08 Mar 2021 08:35)  BP: 120/75 (08 Mar 2021 08:05)  RR: 18 (08 Mar 2021 08:05)  SpO2: 97% (08 Mar 2021 08:35) (88% - 97%)  temp max in last 48H T(F): , Max: 99.8 (03-06-21 @ 20:20)      Current Antibiotics:  remdesivir  IVPB 100 milliGRAM(s) IV Intermittent every 24 hours    Other medications:  budesonide 160 MICROgram(s)/formoterol 4.5 MICROgram(s) Inhaler 2 Puff(s) Inhalation two times a day  dextrose 40% Gel 15 Gram(s) Oral once  dextrose 5%. 1000 milliLiter(s) IV Continuous <Continuous>  dextrose 5%. 1000 milliLiter(s) IV Continuous <Continuous>  dextrose 50% Injectable 25 Gram(s) IV Push once  dextrose 50% Injectable 12.5 Gram(s) IV Push once  dextrose 50% Injectable 25 Gram(s) IV Push once  enoxaparin Injectable 40 milliGRAM(s) SubCutaneous every 12 hours  famotidine Injectable 20 milliGRAM(s) IV Push two times a day  glucagon  Injectable 1 milliGRAM(s) IntraMuscular once  insulin lispro (ADMELOG) corrective regimen sliding scale   SubCutaneous three times a day before meals  insulin lispro (ADMELOG) corrective regimen sliding scale   SubCutaneous at bedtime  sodium chloride 0.65% Nasal 1 Spray(s) Both Nostrils two times a day  tiotropium 18 MICROgram(s) Capsule 1 Capsule(s) Inhalation daily                 13.8   23.21 )-----------( 430      ( 08 Mar 2021 05:57 )             41.9     03-08    131<L>  |  94<L>  |  23.0<H>  ----------------------------<  90  4.3   |  23.0  |  0.81    Ca    8.3<L>      08 Mar 2021 05:57    TPro  6.6  /  Alb  2.4<L>  /  TBili  0.3<L>  /  DBili  0.1  /  AST  26  /  ALT  19  /  AlkPhos  88  03-08      RECENT CULTURES:  03-03 @ 22:19 .Blood Blood-Peripheral     No growth at 48 hours      WBC Count: 23.21 K/uL (03-08-21 @ 05:57)  WBC Count: 23.27 K/uL (03-07-21 @ 10:07)  WBC Count: 22.92 K/uL (03-06-21 @ 08:43)  WBC Count: 25.71 K/uL (03-05-21 @ 08:38)  WBC Count: 20.81 K/uL (03-04-21 @ 07:34)    Creatinine, Serum: 0.81 mg/dL (03-08-21 @ 05:57)  Creatinine, Serum: 0.80 mg/dL (03-07-21 @ 10:07)  Creatinine, Serum: 0.78 mg/dL (03-06-21 @ 08:43)  Creatinine, Serum: 0.88 mg/dL (03-05-21 @ 08:10)  Creatinine, Serum: 0.88 mg/dL (03-05-21 @ 08:10)  Creatinine, Serum: 1.00 mg/dL (03-04-21 @ 07:34)    C-Reactive Protein, Serum: 117 mg/L (03-06-21 @ 08:43)  C-Reactive Protein, Serum: 128 mg/L (03-04-21 @ 07:34)  C-Reactive Protein, Serum: 45 mg/L (03-03-21 @ 06:48)  C-Reactive Protein, Serum: 30 mg/L (03-02-21 @ 10:56)  C-Reactive Protein, Serum: 16.87 mg/dL (02-27-21 @ 22:13)    Ferritin, Serum: 598 ng/mL (03-06-21 @ 08:43)  Ferritin, Serum: 513 ng/mL (03-04-21 @ 07:34)  Ferritin, Serum: 659 ng/mL (02-27-21 @ 22:13)    Procalcitonin, Serum: 0.50 ng/mL (03-08-21 @ 05:57)  Procalcitonin, Serum: 0.51 ng/mL (03-04-21 @ 07:34)  Procalcitonin, Serum: 0.28 ng/mL (03-03-21 @ 06:48)  Procalcitonin, Serum: 0.25 ng/mL (02-27-21 @ 22:13)     COVID-19 IgG Antibody Interpretation: Negative (02-28-21 @ 12:37)  COVID-19 IgG Antibody Index: 0.14 Index (02-28-21 @ 12:37)  COVID-19 PCR: Detected (02-27-21 @ 22:15)

## 2021-03-08 NOTE — PROGRESS NOTE ADULT - ASSESSMENT
55y/oM PMH HTN, HLD, DM presenting with SOB, admitted with hypoxic respiratory failure to ICU, placed on Bipap. Weaned to HFNC and downgraded 3/1.     Acute hypoxic respiratory failure 2/2 COVID-19 PNA   Elevated d-dimer  Remains acutely hypoxic requiring Hiflo  Will attempt to titarte down as tolerated   cont remdesivir, dexamethasone (Day 9)  ID recs appreciated   Encouraged IS, proning   Trend inflammatory markers, ordered for AM    pt not candidate for Actemra (already on Hiflo when Tocilizumab was approved and date of initial diagnosis excludes pt)   D-dimer Up trending   LE duplex neg for DVT, CTA neg for PE   Pulmonary consult appreciated  CCM eval appreciated      HTN  Normotensive, continue holding lisinopril for now  Monitor BP     Prediabetes   HgbA1c 6   cont ISS     Obesity, BMI 32    DVT ppx: Lovenox

## 2021-03-09 LAB
ALBUMIN SERPL ELPH-MCNC: 2.5 G/DL — LOW (ref 3.3–5.2)
ALP SERPL-CCNC: 91 U/L — SIGNIFICANT CHANGE UP (ref 40–120)
ALT FLD-CCNC: 37 U/L — SIGNIFICANT CHANGE UP
AST SERPL-CCNC: 36 U/L — SIGNIFICANT CHANGE UP
BILIRUB DIRECT SERPL-MCNC: 0.1 MG/DL — SIGNIFICANT CHANGE UP (ref 0–0.3)
BILIRUB INDIRECT FLD-MCNC: 0.3 MG/DL — SIGNIFICANT CHANGE UP (ref 0.2–1)
BILIRUB SERPL-MCNC: 0.4 MG/DL — SIGNIFICANT CHANGE UP (ref 0.4–2)
CREAT SERPL-MCNC: 0.8 MG/DL — SIGNIFICANT CHANGE UP (ref 0.5–1.3)
GLUCOSE BLDC GLUCOMTR-MCNC: 108 MG/DL — HIGH (ref 70–99)
GLUCOSE BLDC GLUCOMTR-MCNC: 111 MG/DL — HIGH (ref 70–99)
GLUCOSE BLDC GLUCOMTR-MCNC: 116 MG/DL — HIGH (ref 70–99)
GLUCOSE BLDC GLUCOMTR-MCNC: 137 MG/DL — HIGH (ref 70–99)
INR BLD: 1.36 RATIO — HIGH (ref 0.88–1.16)
PROT SERPL-MCNC: 6.8 G/DL — SIGNIFICANT CHANGE UP (ref 6.6–8.7)
PROTHROM AB SERPL-ACNC: 15.5 SEC — HIGH (ref 10.6–13.6)

## 2021-03-09 PROCEDURE — 99291 CRITICAL CARE FIRST HOUR: CPT

## 2021-03-09 PROCEDURE — 99233 SBSQ HOSP IP/OBS HIGH 50: CPT

## 2021-03-09 RX ORDER — DEXAMETHASONE 0.5 MG/5ML
6 ELIXIR ORAL EVERY 12 HOURS
Refills: 0 | Status: DISCONTINUED | OUTPATIENT
Start: 2021-03-09 | End: 2021-03-09

## 2021-03-09 RX ORDER — DEXAMETHASONE 0.5 MG/5ML
6 ELIXIR ORAL DAILY
Refills: 0 | Status: DISCONTINUED | OUTPATIENT
Start: 2021-03-09 | End: 2021-03-10

## 2021-03-09 RX ORDER — MORPHINE SULFATE 50 MG/1
1 CAPSULE, EXTENDED RELEASE ORAL EVERY 4 HOURS
Refills: 0 | Status: DISCONTINUED | OUTPATIENT
Start: 2021-03-09 | End: 2021-03-09

## 2021-03-09 RX ORDER — LANOLIN ALCOHOL/MO/W.PET/CERES
5 CREAM (GRAM) TOPICAL AT BEDTIME
Refills: 0 | Status: DISCONTINUED | OUTPATIENT
Start: 2021-03-09 | End: 2021-03-28

## 2021-03-09 RX ORDER — MORPHINE SULFATE 50 MG/1
1 CAPSULE, EXTENDED RELEASE ORAL EVERY 6 HOURS
Refills: 0 | Status: DISCONTINUED | OUTPATIENT
Start: 2021-03-09 | End: 2021-03-13

## 2021-03-09 RX ADMIN — MORPHINE SULFATE 1 MILLIGRAM(S): 50 CAPSULE, EXTENDED RELEASE ORAL at 22:19

## 2021-03-09 RX ADMIN — Medication 5 MILLIGRAM(S): at 21:12

## 2021-03-09 RX ADMIN — Medication 650 MILLIGRAM(S): at 23:27

## 2021-03-09 RX ADMIN — Medication 6 MILLIGRAM(S): at 16:13

## 2021-03-09 RX ADMIN — Medication 1 SPRAY(S): at 06:15

## 2021-03-09 RX ADMIN — Medication 650 MILLIGRAM(S): at 22:23

## 2021-03-09 RX ADMIN — Medication 650 MILLIGRAM(S): at 12:23

## 2021-03-09 RX ADMIN — Medication 650 MILLIGRAM(S): at 11:50

## 2021-03-09 RX ADMIN — FAMOTIDINE 20 MILLIGRAM(S): 10 INJECTION INTRAVENOUS at 17:15

## 2021-03-09 RX ADMIN — MORPHINE SULFATE 1 MILLIGRAM(S): 50 CAPSULE, EXTENDED RELEASE ORAL at 21:14

## 2021-03-09 RX ADMIN — REMDESIVIR 500 MILLIGRAM(S): 5 INJECTION INTRAVENOUS at 03:00

## 2021-03-09 RX ADMIN — TIOTROPIUM BROMIDE 1 CAPSULE(S): 18 CAPSULE ORAL; RESPIRATORY (INHALATION) at 08:03

## 2021-03-09 RX ADMIN — ENOXAPARIN SODIUM 40 MILLIGRAM(S): 100 INJECTION SUBCUTANEOUS at 17:15

## 2021-03-09 RX ADMIN — Medication 1 SPRAY(S): at 17:15

## 2021-03-09 RX ADMIN — ENOXAPARIN SODIUM 40 MILLIGRAM(S): 100 INJECTION SUBCUTANEOUS at 06:15

## 2021-03-09 RX ADMIN — BUDESONIDE AND FORMOTEROL FUMARATE DIHYDRATE 2 PUFF(S): 160; 4.5 AEROSOL RESPIRATORY (INHALATION) at 07:58

## 2021-03-09 RX ADMIN — FAMOTIDINE 20 MILLIGRAM(S): 10 INJECTION INTRAVENOUS at 06:15

## 2021-03-09 RX ADMIN — BUDESONIDE AND FORMOTEROL FUMARATE DIHYDRATE 2 PUFF(S): 160; 4.5 AEROSOL RESPIRATORY (INHALATION) at 19:24

## 2021-03-09 NOTE — PROGRESS NOTE ADULT - SUBJECTIVE AND OBJECTIVE BOX
PULMONARY PROGRESS NOTE      [id number]    Patient is a 55y old  Male who presents with a chief complaint of covid hypoxia (09 Mar 2021 12:32)  55 year old male with a pmhx of HTN who presents with shortness of breath. Patient explains his symptoms began 2/22 with cough, kervin, myalgias, nausea, and SOB. Then went to urgent care and was found to be covid + on 2/22. Since then symptoms have gotten worse. Then came to [facility] for severe shortness of breath. EMS found him with sats of 50s on room air. Then upon arrival he was placed on bipap for increase work of breathing.    - was seen by MICU  - treated with dexamethasone placed on bipap   -treated with remdesivir       INTERVAL HPI/OVERNIGHT EVENTS:    MEDICATIONS  (STANDING):  budesonide 160 MICROgram(s)/formoterol 4.5 MICROgram(s) Inhaler 2 Puff(s) Inhalation two times a day  dextrose 40% Gel 15 Gram(s) Oral once  dextrose 5%. 1000 milliLiter(s) (50 mL/Hr) IV Continuous <Continuous>  dextrose 5%. 1000 milliLiter(s) (100 mL/Hr) IV Continuous <Continuous>  dextrose 50% Injectable 25 Gram(s) IV Push once  dextrose 50% Injectable 12.5 Gram(s) IV Push once  dextrose 50% Injectable 25 Gram(s) IV Push once  enoxaparin Injectable 40 milliGRAM(s) SubCutaneous every 12 hours  famotidine Injectable 20 milliGRAM(s) IV Push two times a day  glucagon  Injectable 1 milliGRAM(s) IntraMuscular once  insulin lispro (ADMELOG) corrective regimen sliding scale   SubCutaneous three times a day before meals  insulin lispro (ADMELOG) corrective regimen sliding scale   SubCutaneous at bedtime  melatonin 5 milliGRAM(s) Oral at bedtime  sodium chloride 0.65% Nasal 1 Spray(s) Both Nostrils two times a day  tiotropium 18 MICROgram(s) Capsule 1 Capsule(s) Inhalation daily      MEDICATIONS  (PRN):  acetaminophen   Tablet .. 650 milliGRAM(s) Oral every 6 hours PRN Mild Pain (1 - 3), Moderate Pain (4 - 6)  morphine   Solution 1 milliGRAM(s) Oral every 4 hours PRN Severe Pain (7 - 10) OR dyspnea  morphine  - Injectable 1 milliGRAM(s) IV Push every 6 hours PRN SOB      Allergies    No Known Allergies    Intolerances        PAST MEDICAL & SURGICAL HISTORY:  Hypertension    No significant past surgical history        SOCIAL HISTORY  Smoking History:       REVIEW OF SYSTEMS:    CONSTITUTIONAL:  No distress    HEENT:  Eyes:  No diplopia or blurred vision. ENT:  No earache, sore throat or runny nose.    CARDIOVASCULAR:  No pressure, squeezing, tightness, heaviness or aching about the chest; no palpitations.    RESPIRATORY:  see HPI    GASTROINTESTINAL:  No nausea, vomiting or diarrhea.    GENITOURINARY:  No dysuria, frequency or urgency.    NEUROLOGIC:  No paresthesias, fasciculations, seizures or weakness.    PSYCHIATRIC:  No disorder of thought or mood.    Vital Signs Last 24 Hrs  T(C): 36.4 (09 Mar 2021 11:49), Max: 37.2 (08 Mar 2021 20:00)  T(F): 97.6 (09 Mar 2021 11:49), Max: 99 (08 Mar 2021 20:00)  HR: 111 (09 Mar 2021 11:49) (71 - 111)  BP: 96/61 (09 Mar 2021 11:49) (96/61 - 108/70)  BP(mean): --  RR: 20 (09 Mar 2021 11:49) (16 - 25)  SpO2: 93% (09 Mar 2021 11:49) (84% - 95%)    PHYSICAL EXAMINATION:    GENERAL: The patient is awake and alert in no apparent distress.       LABS:                        13.8   23.21 )-----------( 430      ( 08 Mar 2021 05:57 )             41.9     03-09    x   |  x   |  x   ----------------------------<  x   x    |  x   |  0.80    Ca    8.3<L>      08 Mar 2021 05:57    TPro  6.8  /  Alb  2.5<L>  /  TBili  0.4  /  DBili  0.1  /  AST  36  /  ALT  37  /  AlkPhos  91  03-09    PT/INR - ( 09 Mar 2021 06:16 )   PT: 15.5 sec;   INR: 1.36 ratio                         Procalcitonin, Serum: 0.50 ng/mL (03-08-21 @ 05:57)      MICROBIOLOGY:    RADIOLOGY & ADDITIONAL STUDIES:  cxrs and CTscan reviewed

## 2021-03-09 NOTE — PROGRESS NOTE ADULT - ASSESSMENT
55y/oM PMH HTN, HLD, DM presenting with SOB, admitted with hypoxic respiratory failure to ICU, placed on Bipap. Weaned to HFNC and downgraded 3/1.     Acute hypoxic respiratory failure 2/2 COVID-19 PNA   Elevated d-dimer  Remains acutely hypoxic requiring Hiflo  Will attempt to titarte down as tolerated   cont remdesivir, dexamethasone (Day 9)  ID recs appreciated   Encouraged IS, proning   Trend inflammatory markers, ordered for AM    pt not candidate for Actemra (already on Hiflo when Tocilizumab was approved and date of initial diagnosis excludes pt)   D-dimer Up trending   LE duplex neg for DVT, CTA neg for PE   Pulmonary consult appreciated  CCM eval appreciated      HTN  Normotensive, continue holding lisinopril for now  Monitor BP     Prediabetes   HgbA1c 6   cont ISS     Obesity, BMI 32    DVT ppx: Lovenox       55y/oM PMH HTN, HLD, DM presenting with SOB, admitted with hypoxic respiratory failure to ICU, placed on Bipap. Weaned to HFNC and downgraded 3/1.     Acute hypoxic respiratory failure 2/2 COVID-19 PNA   Acutely worsneing  Remains acutely hypoxic requiring Hiflo  CCM reconsulted, agree with NIPPV if pts work of breathing increases  Pt wishes to be DNI (NOT DNR)  s/p completion of extended Remdesivir / Dexamethasone course  Encouraged IS, proning   pt not candidate for Actemra (already on Hiflo when Tocilizumab was approved and date of initial diagnosis excludes pt)   D-dimer Up trending   LE duplex neg for DVT, CTA neg for PE   Pulmonary consult appreciated  CCM eval appreciated      HTN  Normotensive, continue holding lisinopril for now  Monitor BP     Prediabetes   HgbA1c 6   cont ISS     Obesity, BMI 32    DVT ppx: Lovenox      Pt is DNI but NOT DNR

## 2021-03-09 NOTE — PROGRESS NOTE ADULT - ASSESSMENT
Covid 19 pneumonia, acute hypoxemic resp failure  ID, ICU input noted, PE/DVT excluded   post remdesivir and decadron  remains on high Fi02 , current BIPAP 14/6, 90% sp02 97%  self proning  high flow when off bipap  restart Decadron given elevated inflammatory markers as salvage   wean fio2 as able  remains critical Covid 19 pneumonia, acute hypoxemic resp failure  ID, ICU input noted, PE/DVT excluded   post remdesivir and decadron  remains on high Fi02 , current BIPAP 14/6, 90% sp02 97%  self proning  high flow when off bipap  restart Decadron given elevated inflammatory markers as salvage , check fungitell  wean fio2 as able  remains critical

## 2021-03-09 NOTE — CONSULT NOTE ADULT - PROBLEM SELECTOR RECOMMENDATION 2
On high flow NC with O2 sat between 89-92%  Can use NIPPV if increased work of breathing On high flow NC with O2 sat between 89-92%  Can use NIPPV if increased work of breathing  Would recommend Palliative care consult as pt has told PCP he wants to be DNI but not DNR would clarify

## 2021-03-09 NOTE — CONSULT NOTE ADULT - SUBJECTIVE AND OBJECTIVE BOX
REASON FOR CONSULT: hypoxia    CONSULT REQUESTED BY: ***    Patient is a 55y old  Male who presents with a chief complaint of Covid hypoxia (08 Mar 2021 10:04)      BRIEF HOSPITAL COURSE: 55 year old male pt with PMHx significant for HTN presents with shortness of breath. Pt diagnosed with COVID-19 on 2/22/21. Patient was admitted to MICU, stabilized and downgraded. MICU team was called today to reevaluate the patient for worsening hypoxia. At this time the patient remains on high flow NC at 100%/50L and is tolerating it well. Pt appeared comfortable in his chair and in no acute distress. Pt was able to speak in full sentences without needing take pauses to catch his breath. Patient states he feels he is improving. Patient admits to cough and SOB when standing and moving. Pt also admits to getting confused when standing. Patient denies chest pain, abdominal pain, fever, chills, N/V.       PAST MEDICAL & SURGICAL HISTORY:  Hypertension    No significant past surgical history      Allergies    No Known Allergies    Intolerances      FAMILY HISTORY:      Review of Systems:  CONSTITUTIONAL: No fever, chills, or fatigue  EYES: No eye pain, visual disturbances, or discharge  ENMT:  No difficulty hearing, tinnitus, vertigo; No sinus or throat pain  NECK: No pain or stiffness  RESPIRATORY: Mild shortness of breath  CARDIOVASCULAR: No chest pain, palpitations, dizziness, or leg swelling  GASTROINTESTINAL: No abdominal or epigastric pain. No nausea, vomiting, or hematemesis; No diarrhea or constipation. No melena or hematochezia.  GENITOURINARY: No dysuria, frequency, hematuria, or incontinence  NEUROLOGICAL: No headaches, memory loss, loss of strength, numbness, or tremors  SKIN: No itching, burning, rashes, or lesions   MUSCULOSKELETAL: No joint pain or swelling; No muscle, back, or extremity pain  PSYCHIATRIC: No depression, anxiety, mood swings, or difficulty sleeping      Medications:      budesonide 160 MICROgram(s)/formoterol 4.5 MICROgram(s) Inhaler 2 Puff(s) Inhalation two times a day  tiotropium 18 MICROgram(s) Capsule 1 Capsule(s) Inhalation daily    acetaminophen   Tablet .. 650 milliGRAM(s) Oral every 6 hours PRN      enoxaparin Injectable 40 milliGRAM(s) SubCutaneous every 12 hours    famotidine Injectable 20 milliGRAM(s) IV Push two times a day      dextrose 40% Gel 15 Gram(s) Oral once  dextrose 50% Injectable 25 Gram(s) IV Push once  dextrose 50% Injectable 12.5 Gram(s) IV Push once  dextrose 50% Injectable 25 Gram(s) IV Push once  glucagon  Injectable 1 milliGRAM(s) IntraMuscular once  insulin lispro (ADMELOG) corrective regimen sliding scale   SubCutaneous three times a day before meals  insulin lispro (ADMELOG) corrective regimen sliding scale   SubCutaneous at bedtime    dextrose 5%. 1000 milliLiter(s) IV Continuous <Continuous>  dextrose 5%. 1000 milliLiter(s) IV Continuous <Continuous>      sodium chloride 0.65% Nasal 1 Spray(s) Both Nostrils two times a day            ICU Vital Signs Last 24 Hrs  T(C): 36.9 (09 Mar 2021 07:50), Max: 37.2 (08 Mar 2021 20:00)  T(F): 98.5 (09 Mar 2021 07:50), Max: 99 (08 Mar 2021 20:00)  HR: 94 (09 Mar 2021 07:58) (71 - 94)  BP: 108/70 (09 Mar 2021 07:50) (102/63 - 113/71)  BP(mean): --  ABP: --  ABP(mean): --  RR: 16 (09 Mar 2021 07:50) (16 - 25)  SpO2: 89% (09 Mar 2021 07:58) (84% - 95%)    Vital Signs Last 24 Hrs  T(C): 36.9 (09 Mar 2021 07:50), Max: 37.2 (08 Mar 2021 20:00)  T(F): 98.5 (09 Mar 2021 07:50), Max: 99 (08 Mar 2021 20:00)  HR: 94 (09 Mar 2021 07:58) (71 - 94)  BP: 108/70 (09 Mar 2021 07:50) (102/63 - 113/71)  BP(mean): --  RR: 16 (09 Mar 2021 07:50) (16 - 25)  SpO2: 89% (09 Mar 2021 07:58) (84% - 95%)        I&O's Detail    08 Mar 2021 07:01  -  09 Mar 2021 07:00  --------------------------------------------------------  IN:    Oral Fluid: 300 mL  Total IN: 300 mL    OUT:    Voided (mL): 1325 mL  Total OUT: 1325 mL    Total NET: -1025 mL      09 Mar 2021 07:01  -  09 Mar 2021 10:58  --------------------------------------------------------  IN:  Total IN: 0 mL    OUT:    Voided (mL): 300 mL  Total OUT: 300 mL    Total NET: -300 mL            LABS:                        13.8   23.21 )-----------( 430      ( 08 Mar 2021 05:57 )             41.9     03-09    x   |  x   |  x   ----------------------------<  x   x    |  x   |  0.80    Ca    8.3<L>      08 Mar 2021 05:57    TPro  6.8  /  Alb  2.5<L>  /  TBili  0.4  /  DBili  0.1  /  AST  36  /  ALT  37  /  AlkPhos  91  03-09          CAPILLARY BLOOD GLUCOSE      POCT Blood Glucose.: 108 mg/dL (09 Mar 2021 07:34)    PT/INR - ( 09 Mar 2021 06:16 )   PT: 15.5 sec;   INR: 1.36 ratio             CULTURES:  Culture Results:   No growth at 5 days. (03-03 @ 22:19)  Culture Results:   No growth at 5 days. (03-03 @ 22:19)      Physical Examination:    General: No acute distress.  Alert, oriented, interactive, nonfocal  HEENT: Pupils equal, reactive to light.  Symmetric.  PULM: mild shortness of breath, diminished at bases bilaterally, mild wheezing, no significant sputum production  CVS: Regular rate and rhythm, no murmurs, rubs, or gallops  ABD: Soft, nondistended, nontender, normoactive bowel sounds, no masses  EXT: No edema, nontender  SKIN: Warm and well perfused, no rashes noted.    RADIOLOGY: images reviewed    CRITICAL CARE TIME SPENT: ***   REASON FOR CONSULT: hypoxia    CONSULT REQUESTED BY: ***    Patient is a 55y old  Male who presents with a chief complaint of Covid hypoxia (08 Mar 2021 10:04)      BRIEF HOSPITAL COURSE: 55 year old male pt with PMHx significant for HTN presents with shortness of breath. Pt diagnosed with COVID-19 on 2/22/21. Patient was admitted to MICU, stabilized and downgraded. MICU team was called today to reevaluate the patient for worsening hypoxia. At this time the patient remains on high flow NC at 100%/50L and is tolerating it well. Pt appeared comfortable in his chair and in no acute distress. Pt was able to speak in full sentences without needing take pauses to catch his breath. Patient states he feels he is improving. Patient admits to cough and SOB when standing and moving. Pt also admits to getting confused when standing. Patient denies chest pain, abdominal pain, fever, chills, N/V.   At this time pt does not require ICU level of care, if pt condition worsens please reconsult PRN.     PAST MEDICAL & SURGICAL HISTORY:  Hypertension    No significant past surgical history      Allergies    No Known Allergies    Intolerances      FAMILY HISTORY:      Review of Systems:  CONSTITUTIONAL: No fever, chills, or fatigue  EYES: No eye pain, visual disturbances, or discharge  ENMT:  No difficulty hearing, tinnitus, vertigo; No sinus or throat pain  NECK: No pain or stiffness  RESPIRATORY: Mild shortness of breath  CARDIOVASCULAR: No chest pain, palpitations, dizziness, or leg swelling  GASTROINTESTINAL: No abdominal or epigastric pain. No nausea, vomiting, or hematemesis; No diarrhea or constipation. No melena or hematochezia.  GENITOURINARY: No dysuria, frequency, hematuria, or incontinence  NEUROLOGICAL: No headaches, memory loss, loss of strength, numbness, or tremors  SKIN: No itching, burning, rashes, or lesions   MUSCULOSKELETAL: No joint pain or swelling; No muscle, back, or extremity pain  PSYCHIATRIC: No depression, anxiety, mood swings, or difficulty sleeping      Medications:      budesonide 160 MICROgram(s)/formoterol 4.5 MICROgram(s) Inhaler 2 Puff(s) Inhalation two times a day  tiotropium 18 MICROgram(s) Capsule 1 Capsule(s) Inhalation daily    acetaminophen   Tablet .. 650 milliGRAM(s) Oral every 6 hours PRN      enoxaparin Injectable 40 milliGRAM(s) SubCutaneous every 12 hours    famotidine Injectable 20 milliGRAM(s) IV Push two times a day      dextrose 40% Gel 15 Gram(s) Oral once  dextrose 50% Injectable 25 Gram(s) IV Push once  dextrose 50% Injectable 12.5 Gram(s) IV Push once  dextrose 50% Injectable 25 Gram(s) IV Push once  glucagon  Injectable 1 milliGRAM(s) IntraMuscular once  insulin lispro (ADMELOG) corrective regimen sliding scale   SubCutaneous three times a day before meals  insulin lispro (ADMELOG) corrective regimen sliding scale   SubCutaneous at bedtime    dextrose 5%. 1000 milliLiter(s) IV Continuous <Continuous>  dextrose 5%. 1000 milliLiter(s) IV Continuous <Continuous>      sodium chloride 0.65% Nasal 1 Spray(s) Both Nostrils two times a day    ICU Vital Signs Last 24 Hrs  T(C): 36.9 (09 Mar 2021 07:50), Max: 37.2 (08 Mar 2021 20:00)  T(F): 98.5 (09 Mar 2021 07:50), Max: 99 (08 Mar 2021 20:00)  HR: 94 (09 Mar 2021 07:58) (71 - 94)  BP: 108/70 (09 Mar 2021 07:50) (102/63 - 113/71)  BP(mean): --  ABP: --  ABP(mean): --  RR: 16 (09 Mar 2021 07:50) (16 - 25)  SpO2: 89% (09 Mar 2021 07:58) (84% - 95%)    Vital Signs Last 24 Hrs  T(C): 36.9 (09 Mar 2021 07:50), Max: 37.2 (08 Mar 2021 20:00)  T(F): 98.5 (09 Mar 2021 07:50), Max: 99 (08 Mar 2021 20:00)  HR: 94 (09 Mar 2021 07:58) (71 - 94)  BP: 108/70 (09 Mar 2021 07:50) (102/63 - 113/71)  BP(mean): --  RR: 16 (09 Mar 2021 07:50) (16 - 25)  SpO2: 89% (09 Mar 2021 07:58) (84% - 95%)        I&O's Detail    08 Mar 2021 07:01  -  09 Mar 2021 07:00  --------------------------------------------------------  IN:    Oral Fluid: 300 mL  Total IN: 300 mL    OUT:    Voided (mL): 1325 mL  Total OUT: 1325 mL    Total NET: -1025 mL      09 Mar 2021 07:01  -  09 Mar 2021 10:58  --------------------------------------------------------  IN:  Total IN: 0 mL    OUT:    Voided (mL): 300 mL  Total OUT: 300 mL    Total NET: -300 mL            LABS:                        13.8   23.21 )-----------( 430      ( 08 Mar 2021 05:57 )             41.9     03-09    x   |  x   |  x   ----------------------------<  x   x    |  x   |  0.80    Ca    8.3<L>      08 Mar 2021 05:57    TPro  6.8  /  Alb  2.5<L>  /  TBili  0.4  /  DBili  0.1  /  AST  36  /  ALT  37  /  AlkPhos  91  03-09          CAPILLARY BLOOD GLUCOSE      POCT Blood Glucose.: 108 mg/dL (09 Mar 2021 07:34)    PT/INR - ( 09 Mar 2021 06:16 )   PT: 15.5 sec;   INR: 1.36 ratio             CULTURES:  Culture Results:   No growth at 5 days. (03-03 @ 22:19)  Culture Results:   No growth at 5 days. (03-03 @ 22:19)      Physical Examination:    General: No acute distress.  Alert, oriented, interactive, nonfocal  HEENT: Pupils equal, reactive to light.  Symmetric.  PULM: mild shortness of breath, diminished at bases bilaterally, mild wheezing, no significant sputum production  CVS: Regular rate and rhythm, no murmurs, rubs, or gallops  ABD: Soft, nondistended, nontender, normoactive bowel sounds, no masses  EXT: No edema, nontender  SKIN: Warm and well perfused, no rashes noted.    RADIOLOGY: images reviewed    CRITICAL CARE TIME SPENT: ***

## 2021-03-09 NOTE — PROGRESS NOTE ADULT - SUBJECTIVE AND OBJECTIVE BOX
[facility] Division of Hospital Medicine    Chief Complaint:  SOB    SUBJECTIVE / OVERNIGHT EVENTS:  Pt examined sitting up in chair  Notably dyspneic and tachypneic with minimal conversation  Clarified GOC with pt, he is clear about wishing to be DNI but doies not wish to be DNR  CCM reevaluation requested, appreciate f/u  Patient denies abd pain, N/V, fever, chills, dysuria or any other complaints. All remainder ROS negative.     MEDICATIONS  (STANDING):  budesonide 160 MICROgram(s)/formoterol 4.5 MICROgram(s) Inhaler 2 Puff(s) Inhalation two times a day  dextrose 40% Gel 15 Gram(s) Oral once  dextrose 5%. 1000 milliLiter(s) (50 mL/Hr) IV Continuous <Continuous>  dextrose 5%. 1000 milliLiter(s) (100 mL/Hr) IV Continuous <Continuous>  dextrose 50% Injectable 25 Gram(s) IV Push once  dextrose 50% Injectable 12.5 Gram(s) IV Push once  dextrose 50% Injectable 25 Gram(s) IV Push once  enoxaparin Injectable 40 milliGRAM(s) SubCutaneous every 12 hours  famotidine Injectable 20 milliGRAM(s) IV Push two times a day  glucagon  Injectable 1 milliGRAM(s) IntraMuscular once  insulin lispro (ADMELOG) corrective regimen sliding scale   SubCutaneous three times a day before meals  insulin lispro (ADMELOG) corrective regimen sliding scale   SubCutaneous at bedtime  melatonin 5 milliGRAM(s) Oral at bedtime  sodium chloride 0.65% Nasal 1 Spray(s) Both Nostrils two times a day  tiotropium 18 MICROgram(s) Capsule 1 Capsule(s) Inhalation daily    MEDICATIONS  (PRN):  acetaminophen   Tablet .. 650 milliGRAM(s) Oral every 6 hours PRN Mild Pain (1 - 3), Moderate Pain (4 - 6)  morphine   Solution 1 milliGRAM(s) Oral every 4 hours PRN Severe Pain (7 - 10) OR dyspnea  morphine  - Injectable 1 milliGRAM(s) IV Push every 6 hours PRN SOB          PHYSICAL EXAM:  Vital Signs Last 24 Hrs  T(C): 36.4 (09 Mar 2021 11:49), Max: 37.2 (08 Mar 2021 20:00)  T(F): 97.6 (09 Mar 2021 11:49), Max: 99 (08 Mar 2021 20:00)  HR: 111 (09 Mar 2021 11:49) (71 - 111)  BP: 96/61 (09 Mar 2021 11:49) (96/61 - 108/70)  BP(mean): --  RR: 20 (09 Mar 2021 11:49) (16 - 25)  SpO2: 93% (09 Mar 2021 11:49) (84% - 95%)    CONSTITUTIONAL: NAD, On HiFlo, dypenic and tahcypneic   ENMT: Moist oral mucosa  RESPIRATORY: Very shallow breathsm no wheezes appreciated   CARDIOVASCULAR: Regular rate and rhythm, normal S1 and S2, No lower extremity edema  ABDOMEN: Nontender to palpation, normoactive bowel sounds  MUSCLOSKELETAL: no clubbing or cyanosis of digits; no joint swelling or tenderness to palpation  PSYCH: A+O to person, place, and time; affect appropriate  NEUROLOGY: CN 2-12 are intact and symmetric; no gross sensory deficits  SKIN: No rashes; no palpable lesions    LABS:                                              13.8   23.21 )-----------( 430      ( 08 Mar 2021 05:57 )             41.9   03-09    x   |  x   |  x   ----------------------------<  x   x    |  x   |  0.80    Ca    8.3<L>      08 Mar 2021 05:57    TPro  6.8  /  Alb  2.5<L>  /  TBili  0.4  /  DBili  0.1  /  AST  36  /  ALT  37  /  AlkPhos  91  03-09        LDH : 485  Procalcitonin : 0.25 > 0.28 > 0.51 > 0.50  Ferritin : 659 > 513 > 598  D-Dimer : 224 > 460 > 561 > 549 > 765 > 977  CRP : 16.87 > 30 > 45 > 128 > 117 > 114.31    Pertinent Imaging    2/28/21 US LE b/l  No evidence of deep venous thrombosis in either lower extremity.    3/5/21 CXR :   Persistent bilateral diffuse airspace disease..    3/6/21 CTA Chest  No pulmonary embolism identified.  Diffuse confluent ground-glass opacities throughout both lungs, worse at the lower lobes.   [facility] Division of Hospital Medicine    Chief Complaint:  SOB    SUBJECTIVE / OVERNIGHT EVENTS:  Pt examined sitting up in chair  Notably dyspneic and tachypneic with minimal conversation  Clarified GOC with pt, he is clear about wishing to be DNI but doies not wish to be DNR  CCM reevaluation requested, appreciate f/u  Patient denies abd pain, N/V, fever, chills, dysuria or any other complaints. All remainder ROS negative.     MEDICATIONS  (STANDING):  budesonide 160 MICROgram(s)/formoterol 4.5 MICROgram(s) Inhaler 2 Puff(s) Inhalation two times a day  dextrose 40% Gel 15 Gram(s) Oral once  dextrose 5%. 1000 milliLiter(s) (50 mL/Hr) IV Continuous <Continuous>  dextrose 5%. 1000 milliLiter(s) (100 mL/Hr) IV Continuous <Continuous>  dextrose 50% Injectable 25 Gram(s) IV Push once  dextrose 50% Injectable 12.5 Gram(s) IV Push once  dextrose 50% Injectable 25 Gram(s) IV Push once  enoxaparin Injectable 40 milliGRAM(s) SubCutaneous every 12 hours  famotidine Injectable 20 milliGRAM(s) IV Push two times a day  glucagon  Injectable 1 milliGRAM(s) IntraMuscular once  insulin lispro (ADMELOG) corrective regimen sliding scale   SubCutaneous three times a day before meals  insulin lispro (ADMELOG) corrective regimen sliding scale   SubCutaneous at bedtime  melatonin 5 milliGRAM(s) Oral at bedtime  sodium chloride 0.65% Nasal 1 Spray(s) Both Nostrils two times a day  tiotropium 18 MICROgram(s) Capsule 1 Capsule(s) Inhalation daily    MEDICATIONS  (PRN):  acetaminophen   Tablet .. 650 milliGRAM(s) Oral every 6 hours PRN Mild Pain (1 - 3), Moderate Pain (4 - 6)  morphine   Solution 1 milliGRAM(s) Oral every 4 hours PRN Severe Pain (7 - 10) OR dyspnea  morphine  - Injectable 1 milliGRAM(s) IV Push every 6 hours PRN SOB      PHYSICAL EXAM:  Vital Signs Last 24 Hrs  T(C): 36.4 (09 Mar 2021 11:49), Max: 37.2 (08 Mar 2021 20:00)  T(F): 97.6 (09 Mar 2021 11:49), Max: 99 (08 Mar 2021 20:00)  HR: 111 (09 Mar 2021 11:49) (71 - 111)  BP: 96/61 (09 Mar 2021 11:49) (96/61 - 108/70)  BP(mean): --  RR: 20 (09 Mar 2021 11:49) (16 - 25)  SpO2: 93% (09 Mar 2021 11:49) (84% - 95%)    CONSTITUTIONAL: NAD, On HiFlo, dypenic and tahcypneic   ENMT: Moist oral mucosa  RESPIRATORY: Very shallow breathsm no wheezes appreciated   CARDIOVASCULAR: Regular rate and rhythm, normal S1 and S2, No lower extremity edema  ABDOMEN: Nontender to palpation, normoactive bowel sounds  MUSCLOSKELETAL: no clubbing or cyanosis of digits; no joint swelling or tenderness to palpation  PSYCH: A+O to person, place, and time; affect appropriate  NEUROLOGY: CN 2-12 are intact and symmetric; no gross sensory deficits  SKIN: No rashes; no palpable lesions    LABS:                                              13.8   23.21 )-----------( 430      ( 08 Mar 2021 05:57 )             41.9   03-09    x   |  x   |  x   ----------------------------<  x   x    |  x   |  0.80    Ca    8.3<L>      08 Mar 2021 05:57    TPro  6.8  /  Alb  2.5<L>  /  TBili  0.4  /  DBili  0.1  /  AST  36  /  ALT  37  /  AlkPhos  91  03-09        LDH : 485  Procalcitonin : 0.25 > 0.28 > 0.51 > 0.50  Ferritin : 659 > 513 > 598  D-Dimer : 224 > 460 > 561 > 549 > 765 > 977  CRP : 16.87 > 30 > 45 > 128 > 117 > 114.31    Pertinent Imaging    2/28/21 US LE b/l  No evidence of deep venous thrombosis in either lower extremity.    3/5/21 CXR :   Persistent bilateral diffuse airspace disease..    3/6/21 CTA Chest  No pulmonary embolism identified.  Diffuse confluent ground-glass opacities throughout both lungs, worse at the lower lobes.

## 2021-03-10 LAB
ANION GAP SERPL CALC-SCNC: 13 MMOL/L — SIGNIFICANT CHANGE UP (ref 5–17)
BUN SERPL-MCNC: 19 MG/DL — SIGNIFICANT CHANGE UP (ref 8–20)
CALCIUM SERPL-MCNC: 8.5 MG/DL — LOW (ref 8.6–10.2)
CHLORIDE SERPL-SCNC: 95 MMOL/L — LOW (ref 98–107)
CO2 SERPL-SCNC: 21 MMOL/L — LOW (ref 22–29)
CREAT SERPL-MCNC: 0.69 MG/DL — SIGNIFICANT CHANGE UP (ref 0.5–1.3)
CRP SERPL-MCNC: 106 MG/L — HIGH
D DIMER BLD IA.RAPID-MCNC: 651 NG/ML DDU — HIGH
GLUCOSE BLDC GLUCOMTR-MCNC: 108 MG/DL — HIGH (ref 70–99)
GLUCOSE BLDC GLUCOMTR-MCNC: 148 MG/DL — HIGH (ref 70–99)
GLUCOSE BLDC GLUCOMTR-MCNC: 150 MG/DL — HIGH (ref 70–99)
GLUCOSE BLDC GLUCOMTR-MCNC: 176 MG/DL — HIGH (ref 70–99)
GLUCOSE SERPL-MCNC: 107 MG/DL — HIGH (ref 70–99)
HCT VFR BLD CALC: 42.6 % — SIGNIFICANT CHANGE UP (ref 39–50)
HGB BLD-MCNC: 14 G/DL — SIGNIFICANT CHANGE UP (ref 13–17)
LDH SERPL L TO P-CCNC: 569 U/L — HIGH (ref 98–192)
MCHC RBC-ENTMCNC: 29.9 PG — SIGNIFICANT CHANGE UP (ref 27–34)
MCHC RBC-ENTMCNC: 32.9 GM/DL — SIGNIFICANT CHANGE UP (ref 32–36)
MCV RBC AUTO: 91 FL — SIGNIFICANT CHANGE UP (ref 80–100)
PLATELET # BLD AUTO: 573 K/UL — HIGH (ref 150–400)
POTASSIUM SERPL-MCNC: 5.3 MMOL/L — SIGNIFICANT CHANGE UP (ref 3.5–5.3)
POTASSIUM SERPL-SCNC: 5.3 MMOL/L — SIGNIFICANT CHANGE UP (ref 3.5–5.3)
PROCALCITONIN SERPL-MCNC: 0.25 NG/ML — HIGH (ref 0.02–0.1)
RBC # BLD: 4.68 M/UL — SIGNIFICANT CHANGE UP (ref 4.2–5.8)
RBC # FLD: 13 % — SIGNIFICANT CHANGE UP (ref 10.3–14.5)
SODIUM SERPL-SCNC: 129 MMOL/L — LOW (ref 135–145)
WBC # BLD: 19.79 K/UL — HIGH (ref 3.8–10.5)
WBC # FLD AUTO: 19.79 K/UL — HIGH (ref 3.8–10.5)

## 2021-03-10 PROCEDURE — 71045 X-RAY EXAM CHEST 1 VIEW: CPT | Mod: 26

## 2021-03-10 PROCEDURE — 99233 SBSQ HOSP IP/OBS HIGH 50: CPT

## 2021-03-10 RX ORDER — LANOLIN ALCOHOL/MO/W.PET/CERES
5 CREAM (GRAM) TOPICAL AT BEDTIME
Refills: 0 | Status: DISCONTINUED | OUTPATIENT
Start: 2021-03-10 | End: 2021-03-10

## 2021-03-10 RX ORDER — TRAMADOL HYDROCHLORIDE 50 MG/1
50 TABLET ORAL EVERY 8 HOURS
Refills: 0 | Status: DISCONTINUED | OUTPATIENT
Start: 2021-03-10 | End: 2021-03-17

## 2021-03-10 RX ORDER — DEXAMETHASONE 0.5 MG/5ML
12 ELIXIR ORAL DAILY
Refills: 0 | Status: DISCONTINUED | OUTPATIENT
Start: 2021-03-10 | End: 2021-03-13

## 2021-03-10 RX ADMIN — ENOXAPARIN SODIUM 40 MILLIGRAM(S): 100 INJECTION SUBCUTANEOUS at 17:25

## 2021-03-10 RX ADMIN — Medication 1 SPRAY(S): at 17:25

## 2021-03-10 RX ADMIN — Medication 650 MILLIGRAM(S): at 05:22

## 2021-03-10 RX ADMIN — Medication 650 MILLIGRAM(S): at 06:00

## 2021-03-10 RX ADMIN — Medication 1 SPRAY(S): at 05:22

## 2021-03-10 RX ADMIN — Medication 5 MILLIGRAM(S): at 21:34

## 2021-03-10 RX ADMIN — TRAMADOL HYDROCHLORIDE 50 MILLIGRAM(S): 50 TABLET ORAL at 22:03

## 2021-03-10 RX ADMIN — TRAMADOL HYDROCHLORIDE 50 MILLIGRAM(S): 50 TABLET ORAL at 21:34

## 2021-03-10 RX ADMIN — FAMOTIDINE 20 MILLIGRAM(S): 10 INJECTION INTRAVENOUS at 17:25

## 2021-03-10 RX ADMIN — TIOTROPIUM BROMIDE 1 CAPSULE(S): 18 CAPSULE ORAL; RESPIRATORY (INHALATION) at 08:36

## 2021-03-10 RX ADMIN — Medication 6 MILLIGRAM(S): at 05:21

## 2021-03-10 RX ADMIN — BUDESONIDE AND FORMOTEROL FUMARATE DIHYDRATE 2 PUFF(S): 160; 4.5 AEROSOL RESPIRATORY (INHALATION) at 08:36

## 2021-03-10 RX ADMIN — ENOXAPARIN SODIUM 40 MILLIGRAM(S): 100 INJECTION SUBCUTANEOUS at 05:21

## 2021-03-10 RX ADMIN — FAMOTIDINE 20 MILLIGRAM(S): 10 INJECTION INTRAVENOUS at 05:25

## 2021-03-10 NOTE — PROGRESS NOTE ADULT - ASSESSMENT
55y/oM PMH HTN, HLD, DM presenting with SOB, admitted with hypoxic respiratory failure to ICU, placed on Bipap. Weaned to HFNC and downgraded 3/1.     Acute hypoxic respiratory failure 2/2 COVID-19 PNA   Acutely worsneing  Remains acutely hypoxic requiring Hiflo  NIPPV if pts work of breathing increases  Pt wishes to be DNI (NOT DNR)  s/p completion of extended Remdesivir / Dexamethasone course  Dexamethasone increased to 12 mg daily (as per discussion with pulmonary yesterday   Encouraged IS, proning   pt not candidate for Actemra (already on Hiflo when Tocilizumab was approved and date of initial diagnosis excludes pt)   LE duplex neg for DVT, CTA neg for PE   Pulmonary consult appreciated  CCM eval appreciated      HTN  Normotensive, continue holding lisinopril for now  Monitor BP     Headache with insomnia  Frontal sinus non tender,   No evidence of bacterial infection,  Remains on hi andrew potentially aggravating  Will start tramadol 50 mg Q8 and reassess  Avoid NSAIDs if possible given ongoing high steroid usage and intermittent NIPPV    Prediabetes   HgbA1c 6   cont ISS     Obesity, BMI 32    DVT ppx: Lovenox      Pt is DNI but NOT DNR

## 2021-03-10 NOTE — PROGRESS NOTE ADULT - SUBJECTIVE AND OBJECTIVE BOX
[facility] Division of Hospital Medicine    Chief Complaint:  SOB    SUBJECTIVE / OVERNIGHT EVENTS:  Pt examined sitting up in chair  Significantly less dyspneic today although remains on hi andrew @ 100% 55L  Reports a left sided headache which occurs at night and keeps him from sleeping   Patient denies abd pain, N/V, fever, chills, dysuria or any other complaints. All remainder ROS negative.     MEDICATIONS  (STANDING):  budesonide 160 MICROgram(s)/formoterol 4.5 MICROgram(s) Inhaler 2 Puff(s) Inhalation two times a day  dexAMETHasone     Tablet 12 milliGRAM(s) Oral daily  dextrose 40% Gel 15 Gram(s) Oral once  dextrose 5%. 1000 milliLiter(s) (50 mL/Hr) IV Continuous <Continuous>  dextrose 5%. 1000 milliLiter(s) (100 mL/Hr) IV Continuous <Continuous>  dextrose 50% Injectable 25 Gram(s) IV Push once  dextrose 50% Injectable 12.5 Gram(s) IV Push once  dextrose 50% Injectable 25 Gram(s) IV Push once  enoxaparin Injectable 40 milliGRAM(s) SubCutaneous every 12 hours  famotidine Injectable 20 milliGRAM(s) IV Push two times a day  glucagon  Injectable 1 milliGRAM(s) IntraMuscular once  insulin lispro (ADMELOG) corrective regimen sliding scale   SubCutaneous three times a day before meals  insulin lispro (ADMELOG) corrective regimen sliding scale   SubCutaneous at bedtime  melatonin 5 milliGRAM(s) Oral at bedtime  sodium chloride 0.65% Nasal 1 Spray(s) Both Nostrils two times a day  tiotropium 18 MICROgram(s) Capsule 1 Capsule(s) Inhalation daily    MEDICATIONS  (PRN):  acetaminophen   Tablet .. 650 milliGRAM(s) Oral every 6 hours PRN Mild Pain (1 - 3), Moderate Pain (4 - 6)  morphine   Solution 1 milliGRAM(s) Oral every 4 hours PRN Severe Pain (7 - 10) OR dyspnea  morphine  - Injectable 1 milliGRAM(s) IV Push every 6 hours PRN SOB  traMADol 50 milliGRAM(s) Oral every 8 hours PRN Moderate Pain (4 - 6)        PHYSICAL EXAM:  Vital Signs Last 24 Hrs  T(C): 36.5 (10 Mar 2021 12:00), Max: 36.7 (09 Mar 2021 20:05)  T(F): 97.7 (10 Mar 2021 12:00), Max: 98.1 (09 Mar 2021 20:05)  HR: 89 (10 Mar 2021 12:00) (67 - 89)  BP: 102/67 (10 Mar 2021 12:00) (96/59 - 119/74)  BP(mean): 82 (09 Mar 2021 20:05) (82 - 82)  RR: 20 (10 Mar 2021 12:00) (20 - 34)  SpO2: 90% (10 Mar 2021 12:00) (90% - 94%)    CONSTITUTIONAL: Non toxic appearing male on HiFlo, dyspneic and tachypneic   ENMT: Moist oral mucosa  RESPIRATORY: good air entry b/l, no wheezes appreciated   CARDIOVASCULAR: Regular rate and rhythm, normal S1 and S2, No lower extremity edema  ABDOMEN: Nontender to palpation, normoactive bowel sounds  MUSCLOSKELETAL: no clubbing or cyanosis of digits; no joint swelling or tenderness to palpation  PSYCH: A+O to person, place, and time; affect appropriate  NEUROLOGY: CN 2-12 are intact and symmetric; no gross sensory deficits  SKIN: No rashes; no palpable lesions    LABS:                                              14.0   19.79 )-----------( 573      ( 10 Mar 2021 05:44 )             42.6   03-10    129<L>  |  95<L>  |  19.0  ----------------------------<  107<H>  5.3   |  21.0<L>  |  0.69    Ca    8.5<L>      10 Mar 2021 05:44    TPro  6.8  /  Alb  2.5<L>  /  TBili  0.4  /  DBili  0.1  /  AST  36  /  ALT  37  /  AlkPhos  91  03-09          LDH : 485 > 569   Procalcitonin : 0.25 > 0.28 > 0.51 > 0.50 > 0.25   Ferritin : 659 > 513 > 598  D-Dimer : 224 > 460 > 561 > 549 > 765 > 977  CRP : 16.87 > 30 > 45 > 128 > 117 > 114.31 > 106     Pertinent Imaging    2/28/21 US LE b/l  No evidence of deep venous thrombosis in either lower extremity.    3/5/21 CXR :   Persistent bilateral diffuse airspace disease..    3/6/21 CTA Chest  No pulmonary embolism identified.  Diffuse confluent ground-glass opacities throughout both lungs, worse at the lower lobes.

## 2021-03-10 NOTE — PROGRESS NOTE ADULT - SUBJECTIVE AND OBJECTIVE BOX
PULMONARY PROGRESS NOTE      [id number]      Patient is a 55y old  Male who presents with a chief complaint of covid hypoxia (09 Mar 2021 12:54)      Events last 24 hours: *  more comfortable  feeling better    still requiring   high flow 40 l/min  95 %  speaking full sentences no cough     REVIEW OF SYSTEMS     CONSTITUTIONAL   no fevers  no loss of appetite  no weight loss   HEENT  no sore throat   no ringing in ears  NECK   no pain   RESPIRATORY  see HPI   CARDIOVASCULAR  no chest  pain no palpitations   GASTROINTESTINAL no vomiting  no diarrhea    no   gerd  at times nausea  +   MUSCULOSKELETAL  no joint pains    no  back pain   SKIN   no rash   no itchiness   GENITOURINARY  no dysuria   HEME    no bleeding or bruising   ENDOCRINE     no   warmth   no  sweating   no  cold intolerance   NEUROLOGIC  no tremors  no seizures  no    weakness    PSYCHIATRIC   no mood disorder    no delirium   **    PAST MEDICAL & SURGICAL HISTORY:  Hypertension    No significant past surgical history          Medications:      budesonide 160 MICROgram(s)/formoterol 4.5 MICROgram(s) Inhaler 2 Puff(s) Inhalation two times a day  tiotropium 18 MICROgram(s) Capsule 1 Capsule(s) Inhalation daily    acetaminophen   Tablet .. 650 milliGRAM(s) Oral every 6 hours PRN  melatonin 5 milliGRAM(s) Oral at bedtime  morphine   Solution 1 milliGRAM(s) Oral every 4 hours PRN  morphine  - Injectable 1 milliGRAM(s) IV Push every 6 hours PRN      enoxaparin Injectable 40 milliGRAM(s) SubCutaneous every 12 hours    famotidine Injectable 20 milliGRAM(s) IV Push two times a day      dexAMETHasone     Tablet 12 milliGRAM(s) Oral daily  dextrose 40% Gel 15 Gram(s) Oral once  dextrose 50% Injectable 25 Gram(s) IV Push once  dextrose 50% Injectable 12.5 Gram(s) IV Push once  dextrose 50% Injectable 25 Gram(s) IV Push once  glucagon  Injectable 1 milliGRAM(s) IntraMuscular once  insulin lispro (ADMELOG) corrective regimen sliding scale   SubCutaneous three times a day before meals  insulin lispro (ADMELOG) corrective regimen sliding scale   SubCutaneous at bedtime    dextrose 5%. 1000 milliLiter(s) IV Continuous <Continuous>  dextrose 5%. 1000 milliLiter(s) IV Continuous <Continuous>      sodium chloride 0.65% Nasal 1 Spray(s) Both Nostrils two times a day            ICU Vital Signs Last 24 Hrs  T(C): 36.5 (10 Mar 2021 12:00), Max: 36.7 (09 Mar 2021 20:05)  T(F): 97.7 (10 Mar 2021 12:00), Max: 98.1 (09 Mar 2021 20:05)  HR: 89 (10 Mar 2021 12:00) (67 - 89)  BP: 102/67 (10 Mar 2021 12:00) (96/59 - 119/74)  BP(mean): 82 (09 Mar 2021 20:05) (82 - 82)  ABP: --  ABP(mean): --  RR: 20 (10 Mar 2021 12:00) (20 - 34)  SpO2: 90% (10 Mar 2021 12:00) (90% - 94%)          I&O's Detail    09 Mar 2021 07:01  -  10 Mar 2021 07:00  --------------------------------------------------------  IN:  Total IN: 0 mL    OUT:    Voided (mL): 1850 mL  Total OUT: 1850 mL    Total NET: -1850 mL      10 Mar 2021 07:01  -  10 Mar 2021 13:01  --------------------------------------------------------  IN:  Total IN: 0 mL    OUT:    Voided (mL): 550 mL  Total OUT: 550 mL    Total NET: -550 mL            LABS:                        14.0   19.79 )-----------( 573      ( 10 Mar 2021 05:44 )             42.6     03-10    129<L>  |  95<L>  |  19.0  ----------------------------<  107<H>  5.3   |  21.0<L>  |  0.69    Ca    8.5<L>      10 Mar 2021 05:44    TPro  6.8  /  Alb  2.5<L>  /  TBili  0.4  /  DBili  0.1  /  AST  36  /  ALT  37  /  AlkPhos  91  03-09          CAPILLARY BLOOD GLUCOSE      POCT Blood Glucose.: 148 mg/dL (10 Mar 2021 11:56)    PT/INR - ( 09 Mar 2021 06:16 )   PT: 15.5 sec;   INR: 1.36 ratio             CULTURES:  Culture Results:   No growth at 5 days. (03-03 @ 22:19)  Culture Results:   No growth at 5 days. (03-03 @ 22:19)      Physical Examination:    General: well built male no distress in chair   HEENT: Pupils equal, reactive to light.  Symmetric. nothrush large tongue     PULM: Clear to auscultation bilaterally, no significant sputum production    NECK: Supple, no lymphadenopathy, trachea midline    CVS: Regular rate and rhythm, no murmurs, rubs, or gallops    ABD: Soft, nondistended, nontender, normoactive bowel sounds, no masses    EXT: No edema, nontender    SKIN: Warm and well perfused, no rashes noted.    NEURO: Alert, oriented, interactive, nonfocal      RADIOLOGY: ***

## 2021-03-10 NOTE — PROGRESS NOTE ADULT - ASSESSMENT
56y/o male with :      1- acute hypoxic respiratory failure improving   2- covid- 19 pneumonia  3- possible sleep disorder  4- HTN   -s/p decadron x 1  -s/p remdesivir  - remains hypoxic    complete  10 days decadron  resume  - symbicort bid  - titrate  oxygen  -discussed proning with nurse and patient   -repeat cxr         Please  feel free to reach out with any questions or suggestions    LAUREN ALLEN    PULMONARY and CRITICAL CARE     [phone]

## 2021-03-11 LAB
ANION GAP SERPL CALC-SCNC: 11 MMOL/L — SIGNIFICANT CHANGE UP (ref 5–17)
ANISOCYTOSIS BLD QL: SLIGHT — SIGNIFICANT CHANGE UP
BASOPHILS # BLD AUTO: 0 K/UL — SIGNIFICANT CHANGE UP (ref 0–0.2)
BASOPHILS NFR BLD AUTO: 0 % — SIGNIFICANT CHANGE UP (ref 0–2)
BUN SERPL-MCNC: 21 MG/DL — HIGH (ref 8–20)
BURR CELLS BLD QL SMEAR: SLIGHT — SIGNIFICANT CHANGE UP
CALCIUM SERPL-MCNC: 8.6 MG/DL — SIGNIFICANT CHANGE UP (ref 8.6–10.2)
CHLORIDE SERPL-SCNC: 93 MMOL/L — LOW (ref 98–107)
CO2 SERPL-SCNC: 26 MMOL/L — SIGNIFICANT CHANGE UP (ref 22–29)
CREAT SERPL-MCNC: 0.72 MG/DL — SIGNIFICANT CHANGE UP (ref 0.5–1.3)
CRP SERPL-MCNC: 50 MG/L — HIGH
D DIMER BLD IA.RAPID-MCNC: 736 NG/ML DDU — HIGH
DACRYOCYTES BLD QL SMEAR: SLIGHT — SIGNIFICANT CHANGE UP
ELLIPTOCYTES BLD QL SMEAR: SLIGHT — SIGNIFICANT CHANGE UP
EOSINOPHIL # BLD AUTO: 0 K/UL — SIGNIFICANT CHANGE UP (ref 0–0.5)
EOSINOPHIL NFR BLD AUTO: 0 % — SIGNIFICANT CHANGE UP (ref 0–6)
GIANT PLATELETS BLD QL SMEAR: PRESENT — SIGNIFICANT CHANGE UP
GLUCOSE BLDC GLUCOMTR-MCNC: 113 MG/DL — HIGH (ref 70–99)
GLUCOSE BLDC GLUCOMTR-MCNC: 136 MG/DL — HIGH (ref 70–99)
GLUCOSE BLDC GLUCOMTR-MCNC: 151 MG/DL — HIGH (ref 70–99)
GLUCOSE SERPL-MCNC: 92 MG/DL — SIGNIFICANT CHANGE UP (ref 70–99)
HCT VFR BLD CALC: 45 % — SIGNIFICANT CHANGE UP (ref 39–50)
HGB BLD-MCNC: 14.7 G/DL — SIGNIFICANT CHANGE UP (ref 13–17)
HYPOCHROMIA BLD QL: SLIGHT — SIGNIFICANT CHANGE UP
LDH SERPL L TO P-CCNC: 503 U/L — HIGH (ref 98–192)
LYMPHOCYTES # BLD AUTO: 0.23 K/UL — LOW (ref 1–3.3)
LYMPHOCYTES # BLD AUTO: 0.9 % — LOW (ref 13–44)
MACROCYTES BLD QL: SLIGHT — SIGNIFICANT CHANGE UP
MANUAL SMEAR VERIFICATION: SIGNIFICANT CHANGE UP
MCHC RBC-ENTMCNC: 29.9 PG — SIGNIFICANT CHANGE UP (ref 27–34)
MCHC RBC-ENTMCNC: 32.7 GM/DL — SIGNIFICANT CHANGE UP (ref 32–36)
MCV RBC AUTO: 91.5 FL — SIGNIFICANT CHANGE UP (ref 80–100)
MICROCYTES BLD QL: SLIGHT — SIGNIFICANT CHANGE UP
MONOCYTES # BLD AUTO: 1.1 K/UL — HIGH (ref 0–0.9)
MONOCYTES NFR BLD AUTO: 4.4 % — SIGNIFICANT CHANGE UP (ref 2–14)
NEUTROPHILS # BLD AUTO: 23.73 K/UL — HIGH (ref 1.8–7.4)
NEUTROPHILS NFR BLD AUTO: 94.7 % — HIGH (ref 43–77)
OVALOCYTES BLD QL SMEAR: SLIGHT — SIGNIFICANT CHANGE UP
PLAT MORPH BLD: NORMAL — SIGNIFICANT CHANGE UP
PLATELET # BLD AUTO: 585 K/UL — HIGH (ref 150–400)
PLATELET COUNT - ESTIMATE: ABNORMAL
POIKILOCYTOSIS BLD QL AUTO: SLIGHT — SIGNIFICANT CHANGE UP
POLYCHROMASIA BLD QL SMEAR: SLIGHT — SIGNIFICANT CHANGE UP
POTASSIUM SERPL-MCNC: 5.3 MMOL/L — SIGNIFICANT CHANGE UP (ref 3.5–5.3)
POTASSIUM SERPL-SCNC: 5.3 MMOL/L — SIGNIFICANT CHANGE UP (ref 3.5–5.3)
PROCALCITONIN SERPL-MCNC: 0.18 NG/ML — HIGH (ref 0.02–0.1)
RBC # BLD: 4.92 M/UL — SIGNIFICANT CHANGE UP (ref 4.2–5.8)
RBC # FLD: 12.9 % — SIGNIFICANT CHANGE UP (ref 10.3–14.5)
RBC BLD AUTO: NORMAL — SIGNIFICANT CHANGE UP
SODIUM SERPL-SCNC: 130 MMOL/L — LOW (ref 135–145)
WBC # BLD: 25.06 K/UL — HIGH (ref 3.8–10.5)
WBC # FLD AUTO: 25.06 K/UL — HIGH (ref 3.8–10.5)

## 2021-03-11 PROCEDURE — 99233 SBSQ HOSP IP/OBS HIGH 50: CPT

## 2021-03-11 PROCEDURE — 99232 SBSQ HOSP IP/OBS MODERATE 35: CPT

## 2021-03-11 RX ADMIN — FAMOTIDINE 20 MILLIGRAM(S): 10 INJECTION INTRAVENOUS at 17:34

## 2021-03-11 RX ADMIN — Medication 1 SPRAY(S): at 17:34

## 2021-03-11 RX ADMIN — BUDESONIDE AND FORMOTEROL FUMARATE DIHYDRATE 2 PUFF(S): 160; 4.5 AEROSOL RESPIRATORY (INHALATION) at 20:05

## 2021-03-11 RX ADMIN — ENOXAPARIN SODIUM 40 MILLIGRAM(S): 100 INJECTION SUBCUTANEOUS at 05:41

## 2021-03-11 RX ADMIN — BUDESONIDE AND FORMOTEROL FUMARATE DIHYDRATE 2 PUFF(S): 160; 4.5 AEROSOL RESPIRATORY (INHALATION) at 08:41

## 2021-03-11 RX ADMIN — ENOXAPARIN SODIUM 40 MILLIGRAM(S): 100 INJECTION SUBCUTANEOUS at 17:34

## 2021-03-11 RX ADMIN — Medication 5 MILLIGRAM(S): at 22:02

## 2021-03-11 RX ADMIN — TIOTROPIUM BROMIDE 1 CAPSULE(S): 18 CAPSULE ORAL; RESPIRATORY (INHALATION) at 08:41

## 2021-03-11 RX ADMIN — FAMOTIDINE 20 MILLIGRAM(S): 10 INJECTION INTRAVENOUS at 05:41

## 2021-03-11 RX ADMIN — Medication 1 SPRAY(S): at 05:41

## 2021-03-11 RX ADMIN — Medication 1: at 11:20

## 2021-03-11 RX ADMIN — Medication 12 MILLIGRAM(S): at 05:41

## 2021-03-11 NOTE — PROGRESS NOTE ADULT - ASSESSMENT
55y/oM PMH HTN, HLD, DM presenting with SOB, admitted with hypoxic respiratory failure to ICU, placed on Bipap. Weaned to HFNC and downgraded 3/1.     Acute hypoxic respiratory failure 2/2 COVID-19 PNA   Acutely worsneing  Remains acutely hypoxic requiring Hiflo  NIPPV if pts work of breathing increases  Pt wishes to be DNI (NOT DNR)  s/p completion of extended Remdesivir / Dexamethasone course  Dexamethasone increased to 12 mg daily (as per discussion with pulmonary   Encouraged IS, proning   pt not candidate for Actemra (already on Hiflo when Tocilizumab was approved and date of initial diagnosis excludes pt)   LE duplex neg for DVT, CTA neg for PE   Pulmonary consult appreciated  CCM eval appreciated      HTN  Normotensive, continue holding lisinopril for now  Monitor BP     Headache with insomnia  Continue Tramadol 50 mg Q8 PRN  Concern for intracranial lesions givens wife report  MRI currently being deferred due to pts ongoing hypoxia with hi O2 requirements      Prediabetes   HgbA1c 6   cont ISS     Obesity, BMI 32    DVT ppx: Lovenox      Pt is DNI but NOT DNR

## 2021-03-11 NOTE — PROGRESS NOTE ADULT - SUBJECTIVE AND OBJECTIVE BOX
PULMONARY PROGRESS NOTE      [id number]    Patient is a 55y old  Male who presents with a chief complaint of COVID hypoxia (10 Mar 2021 16:12)          Events last 24 hours: ** still with some nausea  poor appetite  cough improved    feeling a little better    ROS no change*    PAST MEDICAL & SURGICAL HISTORY:  Hypertension    No significant past surgical history          Medications:      budesonide 160 MICROgram(s)/formoterol 4.5 MICROgram(s) Inhaler 2 Puff(s) Inhalation two times a day  tiotropium 18 MICROgram(s) Capsule 1 Capsule(s) Inhalation daily    acetaminophen   Tablet .. 650 milliGRAM(s) Oral every 6 hours PRN  melatonin 5 milliGRAM(s) Oral at bedtime  morphine   Solution 1 milliGRAM(s) Oral every 4 hours PRN  morphine  - Injectable 1 milliGRAM(s) IV Push every 6 hours PRN  traMADol 50 milliGRAM(s) Oral every 8 hours PRN      enoxaparin Injectable 40 milliGRAM(s) SubCutaneous every 12 hours    famotidine Injectable 20 milliGRAM(s) IV Push two times a day      dexAMETHasone     Tablet 12 milliGRAM(s) Oral daily  dextrose 40% Gel 15 Gram(s) Oral once  dextrose 50% Injectable 25 Gram(s) IV Push once  dextrose 50% Injectable 12.5 Gram(s) IV Push once  dextrose 50% Injectable 25 Gram(s) IV Push once  glucagon  Injectable 1 milliGRAM(s) IntraMuscular once  insulin lispro (ADMELOG) corrective regimen sliding scale   SubCutaneous three times a day before meals  insulin lispro (ADMELOG) corrective regimen sliding scale   SubCutaneous at bedtime    dextrose 5%. 1000 milliLiter(s) IV Continuous <Continuous>  dextrose 5%. 1000 milliLiter(s) IV Continuous <Continuous>      sodium chloride 0.65% Nasal 1 Spray(s) Both Nostrils two times a day            ICU Vital Signs Last 24 Hrs  T(C): 36.4 (11 Mar 2021 12:00), Max: 36.9 (10 Mar 2021 20:31)  T(F): 97.6 (11 Mar 2021 12:00), Max: 98.4 (10 Mar 2021 20:31)  HR: 83 (11 Mar 2021 12:18) (77 - 96)  BP: 110/71 (11 Mar 2021 12:00) (110/71 - 129/78)  BP(mean): 94 (10 Mar 2021 20:31) (94 - 94)  ABP: --  ABP(mean): --  RR: 30 (11 Mar 2021 12:00) (18 - 36)  SpO2: 96% (11 Mar 2021 12:18) (89% - 99%)          I&O's Detail    10 Mar 2021 07:01  -  11 Mar 2021 07:00  --------------------------------------------------------  IN:    Oral Fluid: 600 mL  Total IN: 600 mL    OUT:    Voided (mL): 1375 mL  Total OUT: 1375 mL    Total NET: -775 mL      11 Mar 2021 07:01  -  11 Mar 2021 14:28  --------------------------------------------------------  IN:  Total IN: 0 mL    OUT:    Voided (mL): 200 mL  Total OUT: 200 mL    Total NET: -200 mL            LABS:                        14.7   25.06 )-----------( 585      ( 11 Mar 2021 07:34 )             45.0     03-11    130<L>  |  93<L>  |  21.0<H>  ----------------------------<  92  5.3   |  26.0  |  0.72    Ca    8.6      11 Mar 2021 07:34            CAPILLARY BLOOD GLUCOSE      POCT Blood Glucose.: 151 mg/dL (11 Mar 2021 11:16)        CULTURES:      Physical Examination:    General:  well built male in chair   HEENT: Pupils equal, reactive to light.  Symmetric.  high flow in place     PULM: Clear to auscultation bilaterally, no significant sputum production    NECK: Supple, no lymphadenopathy, trachea midline    CVS: Regular rate and rhythm, no murmurs, rubs, or gallops    ABD: Soft, nondistended, nontender, normoactive bowel sounds, no masses    EXT: No edema, nontender    SKIN: Warm and well perfused, no rashes noted.    NEURO: Alert, oriented, interactive, nonfocal        RADIOLOGY: **Single frontal view of the chest demonstrates diffuse bilateral infiltrates, worse. The cardiomediastinal silhouette is enlarged. No acute osseous abnormalities. Overlying EKG leads and wires are noted. Postcholecystectomy clips.    IMPRESSION: Multilobar pneumonia, worse. Cardiomegaly.    DIPAK LOYA MD; Attending Radiologist  This document has been electronically signed. Mar 11 2021  1:29PM  *

## 2021-03-11 NOTE — PROGRESS NOTE ADULT - SUBJECTIVE AND OBJECTIVE BOX
[facility] Division of Hospital Medicine    Chief Complaint:  SOB    SUBJECTIVE / OVERNIGHT EVENTS:  Pt examined sitting up in chair  States that his breathing is slightly better today  Long discussion with pts wife. She reports that the pt has been having chronic headaches for many years and that recently was told that there were spots on his brain. He was supposed to undergo a scan for further investigation, however ehas been unable to follow up due to current hospitlization with Covid PNA  Patient denies abd pain, N/V, fever, chills, dysuria or any other complaints. All remainder ROS negative.     MEDICATIONS  (STANDING):  budesonide 160 MICROgram(s)/formoterol 4.5 MICROgram(s) Inhaler 2 Puff(s) Inhalation two times a day  dexAMETHasone     Tablet 12 milliGRAM(s) Oral daily  dextrose 40% Gel 15 Gram(s) Oral once  dextrose 5%. 1000 milliLiter(s) (50 mL/Hr) IV Continuous <Continuous>  dextrose 5%. 1000 milliLiter(s) (100 mL/Hr) IV Continuous <Continuous>  dextrose 50% Injectable 25 Gram(s) IV Push once  dextrose 50% Injectable 12.5 Gram(s) IV Push once  dextrose 50% Injectable 25 Gram(s) IV Push once  enoxaparin Injectable 40 milliGRAM(s) SubCutaneous every 12 hours  famotidine Injectable 20 milliGRAM(s) IV Push two times a day  glucagon  Injectable 1 milliGRAM(s) IntraMuscular once  insulin lispro (ADMELOG) corrective regimen sliding scale   SubCutaneous three times a day before meals  insulin lispro (ADMELOG) corrective regimen sliding scale   SubCutaneous at bedtime  melatonin 5 milliGRAM(s) Oral at bedtime  sodium chloride 0.65% Nasal 1 Spray(s) Both Nostrils two times a day  tiotropium 18 MICROgram(s) Capsule 1 Capsule(s) Inhalation daily    MEDICATIONS  (PRN):  acetaminophen   Tablet .. 650 milliGRAM(s) Oral every 6 hours PRN Mild Pain (1 - 3), Moderate Pain (4 - 6)  morphine   Solution 1 milliGRAM(s) Oral every 4 hours PRN Severe Pain (7 - 10) OR dyspnea  morphine  - Injectable 1 milliGRAM(s) IV Push every 6 hours PRN SOB  traMADol 50 milliGRAM(s) Oral every 8 hours PRN Moderate Pain (4 - 6)        PHYSICAL EXAM:  Vital Signs Last 24 Hrs  T(C): 36.4 (11 Mar 2021 12:00), Max: 36.9 (10 Mar 2021 20:31)  T(F): 97.6 (11 Mar 2021 12:00), Max: 98.4 (10 Mar 2021 20:31)  HR: 83 (11 Mar 2021 12:18) (77 - 96)  BP: 110/71 (11 Mar 2021 12:00) (110/71 - 129/78)  BP(mean): 94 (10 Mar 2021 20:31) (94 - 94)  RR: 30 (11 Mar 2021 12:00) (30 - 36)  SpO2: 96% (11 Mar 2021 12:18) (89% - 99%)    CONSTITUTIONAL: Non toxic appearing male on HiFlo, less dyspneic today    ENMT: Moist oral mucosa  RESPIRATORY: good air entry b/l, no wheezes appreciated   CARDIOVASCULAR: Regular rate and rhythm, normal S1 and S2, No lower extremity edema  ABDOMEN: Nontender to palpation, normoactive bowel sounds  MUSCLOSKELETAL: no clubbing or cyanosis of digits; no joint swelling or tenderness to palpation  PSYCH: A+O to person, place, and time; affect appropriate  NEUROLOGY: CN 2-12 are intact and symmetric; no gross sensory deficits  SKIN: No rashes; no palpable lesions    LABS:                                   14.7   25.06 )-----------( 585      ( 11 Mar 2021 07:34 )             45.0   03-11    130<L>  |  93<L>  |  21.0<H>  ----------------------------<  92  5.3   |  26.0  |  0.72    Ca    8.6      11 Mar 2021 07:34            LDH : 485 > 569 >  503  Procalcitonin : 0.25 > 0.28 > 0.51 > 0.50 > 0.25 > 0.18  Ferritin : 659 > 513 > 598 >   D-Dimer : 224 > 460 > 561 > 549 > 765 > 977 > 736  CRP : 16.87 > 30 > 45 > 128 > 117 > 114.31 > 106 > 50    Pertinent Imaging    2/28/21 US LE b/l  No evidence of deep venous thrombosis in either lower extremity.    3/5/21 CXR :   Persistent bilateral diffuse airspace disease..    3/6/21 CTA Chest  No pulmonary embolism identified.  Diffuse confluent ground-glass opacities throughout both lungs, worse at the lower lobes.

## 2021-03-11 NOTE — PROGRESS NOTE ADULT - ASSESSMENT
54y/o male with :      1- acute hypoxic respiratory failure   2- covid- 19 pneumonia  3- possible sleep disorder  4- HTN       - high flow  80 % 50l/min   - dexamethasone  -symbicort  - spiriva  -f/u procalcitonin   d dimer   wbc    Please  feel free to reach out with any questions or suggestions    LAUREN ALLEN    PULMONARY and CRITICAL CARE     [phone]

## 2021-03-12 LAB
ANION GAP SERPL CALC-SCNC: 15 MMOL/L — SIGNIFICANT CHANGE UP (ref 5–17)
BUN SERPL-MCNC: 20 MG/DL — SIGNIFICANT CHANGE UP (ref 8–20)
CALCIUM SERPL-MCNC: 8.7 MG/DL — SIGNIFICANT CHANGE UP (ref 8.6–10.2)
CHLORIDE SERPL-SCNC: 92 MMOL/L — LOW (ref 98–107)
CO2 SERPL-SCNC: 25 MMOL/L — SIGNIFICANT CHANGE UP (ref 22–29)
CREAT SERPL-MCNC: 0.73 MG/DL — SIGNIFICANT CHANGE UP (ref 0.5–1.3)
CRP SERPL HS-MCNC: 67.86 MG/L — HIGH (ref 0–3)
D DIMER BLD IA.RAPID-MCNC: 854 NG/ML DDU — HIGH
FERRITIN SERPL-MCNC: 738 NG/ML — HIGH (ref 30–400)
FUNGITELL: <31 PG/ML — SIGNIFICANT CHANGE UP
GLUCOSE BLDC GLUCOMTR-MCNC: 107 MG/DL — HIGH (ref 70–99)
GLUCOSE BLDC GLUCOMTR-MCNC: 113 MG/DL — HIGH (ref 70–99)
GLUCOSE BLDC GLUCOMTR-MCNC: 114 MG/DL — HIGH (ref 70–99)
GLUCOSE BLDC GLUCOMTR-MCNC: 137 MG/DL — HIGH (ref 70–99)
GLUCOSE BLDC GLUCOMTR-MCNC: 173 MG/DL — HIGH (ref 70–99)
GLUCOSE SERPL-MCNC: 115 MG/DL — HIGH (ref 70–99)
HCT VFR BLD CALC: 45.5 % — SIGNIFICANT CHANGE UP (ref 39–50)
HGB BLD-MCNC: 14.9 G/DL — SIGNIFICANT CHANGE UP (ref 13–17)
LDH SERPL L TO P-CCNC: 417 U/L — HIGH (ref 98–192)
MCHC RBC-ENTMCNC: 29.4 PG — SIGNIFICANT CHANGE UP (ref 27–34)
MCHC RBC-ENTMCNC: 32.7 GM/DL — SIGNIFICANT CHANGE UP (ref 32–36)
MCV RBC AUTO: 89.7 FL — SIGNIFICANT CHANGE UP (ref 80–100)
PLATELET # BLD AUTO: 631 K/UL — HIGH (ref 150–400)
POTASSIUM SERPL-MCNC: 4.9 MMOL/L — SIGNIFICANT CHANGE UP (ref 3.5–5.3)
POTASSIUM SERPL-SCNC: 4.9 MMOL/L — SIGNIFICANT CHANGE UP (ref 3.5–5.3)
PROCALCITONIN SERPL-MCNC: 0.15 NG/ML — HIGH (ref 0.02–0.1)
RBC # BLD: 5.07 M/UL — SIGNIFICANT CHANGE UP (ref 4.2–5.8)
RBC # FLD: 12.8 % — SIGNIFICANT CHANGE UP (ref 10.3–14.5)
SODIUM SERPL-SCNC: 132 MMOL/L — LOW (ref 135–145)
WBC # BLD: 22.98 K/UL — HIGH (ref 3.8–10.5)
WBC # FLD AUTO: 22.98 K/UL — HIGH (ref 3.8–10.5)

## 2021-03-12 PROCEDURE — 99232 SBSQ HOSP IP/OBS MODERATE 35: CPT

## 2021-03-12 RX ORDER — PANTOPRAZOLE SODIUM 20 MG/1
40 TABLET, DELAYED RELEASE ORAL
Refills: 0 | Status: DISCONTINUED | OUTPATIENT
Start: 2021-03-12 | End: 2021-03-28

## 2021-03-12 RX ORDER — BENZOCAINE AND MENTHOL 5; 1 G/100ML; G/100ML
1 LIQUID ORAL EVERY 6 HOURS
Refills: 0 | Status: DISCONTINUED | OUTPATIENT
Start: 2021-03-12 | End: 2021-03-28

## 2021-03-12 RX ORDER — FAMOTIDINE 10 MG/ML
20 INJECTION INTRAVENOUS ONCE
Refills: 0 | Status: COMPLETED | OUTPATIENT
Start: 2021-03-12 | End: 2021-03-12

## 2021-03-12 RX ADMIN — Medication 12 MILLIGRAM(S): at 05:30

## 2021-03-12 RX ADMIN — BENZOCAINE AND MENTHOL 1 LOZENGE: 5; 1 LIQUID ORAL at 16:19

## 2021-03-12 RX ADMIN — ENOXAPARIN SODIUM 40 MILLIGRAM(S): 100 INJECTION SUBCUTANEOUS at 05:30

## 2021-03-12 RX ADMIN — MORPHINE SULFATE 1 MILLIGRAM(S): 50 CAPSULE, EXTENDED RELEASE ORAL at 06:00

## 2021-03-12 RX ADMIN — BENZOCAINE AND MENTHOL 1 LOZENGE: 5; 1 LIQUID ORAL at 10:20

## 2021-03-12 RX ADMIN — ENOXAPARIN SODIUM 40 MILLIGRAM(S): 100 INJECTION SUBCUTANEOUS at 18:12

## 2021-03-12 RX ADMIN — Medication 1: at 16:18

## 2021-03-12 RX ADMIN — MORPHINE SULFATE 1 MILLIGRAM(S): 50 CAPSULE, EXTENDED RELEASE ORAL at 05:29

## 2021-03-12 RX ADMIN — PANTOPRAZOLE SODIUM 40 MILLIGRAM(S): 20 TABLET, DELAYED RELEASE ORAL at 08:48

## 2021-03-12 RX ADMIN — Medication 1 SPRAY(S): at 05:30

## 2021-03-12 RX ADMIN — FAMOTIDINE 20 MILLIGRAM(S): 10 INJECTION INTRAVENOUS at 05:30

## 2021-03-12 RX ADMIN — Medication 1 SPRAY(S): at 18:12

## 2021-03-12 RX ADMIN — FAMOTIDINE 20 MILLIGRAM(S): 10 INJECTION INTRAVENOUS at 08:48

## 2021-03-12 NOTE — PROGRESS NOTE ADULT - SUBJECTIVE AND OBJECTIVE BOX
[facility] Division of Hospital Medicine    Chief Complaint:  SOB    SUBJECTIVE / OVERNIGHT EVENTS:  Pt examined sitting up in chair  Significantly better today  Reports heartburn  Patient denies abd pain, N/V, fever, chills, dysuria or any other complaints. All remainder ROS negative.       Brief hospital course:   55b year old male with HTN, HLD, DM admitted for acute hypoxic resp failure 2/2 Covid 19. Initially in the ICU on Bipap, weaned to HFNC and transferred to medicine on 3/1. He has remained acutely hypoxic requiring 100% FiO2 despite completing 10 days fo IV Remdesivir and Dexamethasone 6 mg. Care discussed with pulmonary and critical care but because the pt has clearly stated that although he is agreeable to CPR if needed, he is refusing intubation the pt has remained under medicine Pulmonary did recommend increasing Dexamethasone to 12 mg daily following which there has been some mild improvement in the pts oxygen requirements. CTA imaging and LE dopplers neg for acute thromboembolic disease. He has complained of severe headaches which his wife confirmed is a chronic issue for which he was undergoing a w/u prior to admission (possible lesions on brain for which outpt f/u and imaging were planned).         MEDICATIONS  (STANDING):  budesonide 160 MICROgram(s)/formoterol 4.5 MICROgram(s) Inhaler 2 Puff(s) Inhalation two times a day  dexAMETHasone     Tablet 12 milliGRAM(s) Oral daily  dextrose 40% Gel 15 Gram(s) Oral once  dextrose 5%. 1000 milliLiter(s) (50 mL/Hr) IV Continuous <Continuous>  dextrose 5%. 1000 milliLiter(s) (100 mL/Hr) IV Continuous <Continuous>  dextrose 50% Injectable 25 Gram(s) IV Push once  dextrose 50% Injectable 12.5 Gram(s) IV Push once  dextrose 50% Injectable 25 Gram(s) IV Push once  enoxaparin Injectable 40 milliGRAM(s) SubCutaneous every 12 hours  glucagon  Injectable 1 milliGRAM(s) IntraMuscular once  insulin lispro (ADMELOG) corrective regimen sliding scale   SubCutaneous three times a day before meals  insulin lispro (ADMELOG) corrective regimen sliding scale   SubCutaneous at bedtime  melatonin 5 milliGRAM(s) Oral at bedtime  pantoprazole    Tablet 40 milliGRAM(s) Oral before breakfast  sodium chloride 0.65% Nasal 1 Spray(s) Both Nostrils two times a day  tiotropium 18 MICROgram(s) Capsule 1 Capsule(s) Inhalation daily    MEDICATIONS  (PRN):  acetaminophen   Tablet .. 650 milliGRAM(s) Oral every 6 hours PRN Mild Pain (1 - 3), Moderate Pain (4 - 6)  benzocaine 15 mG/menthol 3.6 mG (Sugar-Free) Lozenge 1 Lozenge Oral every 6 hours PRN Sore Throat  morphine   Solution 1 milliGRAM(s) Oral every 4 hours PRN Severe Pain (7 - 10) OR dyspnea  morphine  - Injectable 1 milliGRAM(s) IV Push every 6 hours PRN SOB  traMADol 50 milliGRAM(s) Oral every 8 hours PRN Moderate Pain (4 - 6)        PHYSICAL EXAM:  Vital Signs Last 24 Hrs  T(C): 36.9 (12 Mar 2021 16:00), Max: 37.4 (11 Mar 2021 20:00)  T(F): 98.4 (12 Mar 2021 16:00), Max: 99.3 (11 Mar 2021 20:00)  HR: 63 (12 Mar 2021 16:00) (63 - 94)  BP: 148/81 (12 Mar 2021 16:00) (117/64 - 157/81)  BP(mean): --  RR: 20 (12 Mar 2021 16:00) (20 - 30)  SpO2: 97% (12 Mar 2021 16:00) (85% - 98%)    CONSTITUTIONAL: Non toxic appearing male on HiFlo, less dyspneic today weaned down to 60%  ENMT: Moist oral mucosa  RESPIRATORY: Shallow breaths, good air entry b/l, no wheezes appreciated   CARDIOVASCULAR: Regular rate and rhythm, normal S1 and S2, No lower extremity edema  ABDOMEN: Nontender to palpation, normoactive bowel sounds  MUSCLOSKELETAL: no clubbing or cyanosis of digits; no joint swelling or tenderness to palpation  PSYCH: A+O to person, place, and time; affect appropriate  NEUROLOGY: CN 2-12 are intact and symmetric; no gross sensory deficits  SKIN: No rashes; no palpable lesions    LABS:                                              14.9   22.98 )-----------( 631      ( 12 Mar 2021 06:59 )             45.5   03-12    132<L>  |  92<L>  |  20.0  ----------------------------<  115<H>  4.9   |  25.0  |  0.73    Ca    8.7      12 Mar 2021 06:59            LDH : 485 > 569 >  503 > 417  Procalcitonin : 0.25 > 0.28 > 0.51 > 0.50 > 0.25 > 0.18 > 0.15  Ferritin : 659 > 513 > 598 > 738  D-Dimer : 224 > 460 > 561 > 549 > 765 > 977 > 736 > 854  CRP : 16.87 > 30 > 45 > 128 > 117 > 114.31 > 106 > 50 > 67.86    Pertinent Imaging    2/28/21 US LE b/l  No evidence of deep venous thrombosis in either lower extremity.    3/5/21 CXR :   Persistent bilateral diffuse airspace disease..    3/6/21 CTA Chest  No pulmonary embolism identified.  Diffuse confluent ground-glass opacities throughout both lungs, worse at the lower lobes.

## 2021-03-12 NOTE — PROGRESS NOTE ADULT - ASSESSMENT
See L ear pain for discussion   55y/oM PMH HTN, HLD, DM presenting with SOB, admitted with hypoxic respiratory failure to ICU, placed on Bipap. Weaned to HFNC and downgraded 3/1.     Acute hypoxic respiratory failure 2/2 COVID-19 PNA   Remains acutely hypoxic requiring Hiflo  NIPPV if pts work of breathing increases  Pt wishes to be DNI (NOT DNR)  s/p completion of extended Remdesivir / Dexamethasone course  Dexamethasone increased to 12 mg daily (as per discussion with pulmonary)  Encouraged IS, proning   pt not candidate for Actemra (already on Hiflo when Tocilizumab was approved and date of initial diagnosis excludes pt)   LE duplex neg for DVT, CTA neg for PE   Pulmonary consult appreciated  CCM eval appreciated      HTN  Normotensive, continue holding lisinopril for now  Monitor BP     Headache with insomnia  Continue Tramadol 50 mg Q8 PRN  Concern for intracranial lesions givens wife report  MRI currently being deferred due to pts ongoing hypoxia with hi O2 requirements      Prediabetes   HgbA1c 6   cont ISS     Obesity, BMI 32    DVT ppx: Lovenox      Pt is DNI but NOT DNR

## 2021-03-13 LAB
ANION GAP SERPL CALC-SCNC: 14 MMOL/L — SIGNIFICANT CHANGE UP (ref 5–17)
BUN SERPL-MCNC: 23 MG/DL — HIGH (ref 8–20)
CALCIUM SERPL-MCNC: 9.3 MG/DL — SIGNIFICANT CHANGE UP (ref 8.6–10.2)
CHLORIDE SERPL-SCNC: 90 MMOL/L — LOW (ref 98–107)
CO2 SERPL-SCNC: 27 MMOL/L — SIGNIFICANT CHANGE UP (ref 22–29)
CREAT SERPL-MCNC: 0.87 MG/DL — SIGNIFICANT CHANGE UP (ref 0.5–1.3)
D DIMER BLD IA.RAPID-MCNC: 419 NG/ML DDU — HIGH
FUNGITELL: <31 PG/ML — SIGNIFICANT CHANGE UP
GLUCOSE BLDC GLUCOMTR-MCNC: 100 MG/DL — HIGH (ref 70–99)
GLUCOSE BLDC GLUCOMTR-MCNC: 148 MG/DL — HIGH (ref 70–99)
GLUCOSE BLDC GLUCOMTR-MCNC: 175 MG/DL — HIGH (ref 70–99)
GLUCOSE BLDC GLUCOMTR-MCNC: 310 MG/DL — HIGH (ref 70–99)
GLUCOSE SERPL-MCNC: 83 MG/DL — SIGNIFICANT CHANGE UP (ref 70–99)
HCT VFR BLD CALC: 49.4 % — SIGNIFICANT CHANGE UP (ref 39–50)
HGB BLD-MCNC: 16 G/DL — SIGNIFICANT CHANGE UP (ref 13–17)
MCHC RBC-ENTMCNC: 30 PG — SIGNIFICANT CHANGE UP (ref 27–34)
MCHC RBC-ENTMCNC: 32.4 GM/DL — SIGNIFICANT CHANGE UP (ref 32–36)
MCV RBC AUTO: 92.5 FL — SIGNIFICANT CHANGE UP (ref 80–100)
PLATELET # BLD AUTO: 638 K/UL — HIGH (ref 150–400)
POTASSIUM SERPL-MCNC: 5.9 MMOL/L — HIGH (ref 3.5–5.3)
POTASSIUM SERPL-SCNC: 5.9 MMOL/L — HIGH (ref 3.5–5.3)
RBC # BLD: 5.34 M/UL — SIGNIFICANT CHANGE UP (ref 4.2–5.8)
RBC # FLD: 12.9 % — SIGNIFICANT CHANGE UP (ref 10.3–14.5)
SODIUM SERPL-SCNC: 130 MMOL/L — LOW (ref 135–145)
WBC # BLD: 20.42 K/UL — HIGH (ref 3.8–10.5)
WBC # FLD AUTO: 20.42 K/UL — HIGH (ref 3.8–10.5)

## 2021-03-13 PROCEDURE — 99291 CRITICAL CARE FIRST HOUR: CPT

## 2021-03-13 PROCEDURE — 93010 ELECTROCARDIOGRAM REPORT: CPT

## 2021-03-13 PROCEDURE — 99233 SBSQ HOSP IP/OBS HIGH 50: CPT

## 2021-03-13 PROCEDURE — 93970 EXTREMITY STUDY: CPT | Mod: 26

## 2021-03-13 PROCEDURE — 71045 X-RAY EXAM CHEST 1 VIEW: CPT | Mod: 26

## 2021-03-13 RX ORDER — SODIUM POLYSTYRENE SULFONATE 4.1 MEQ/G
15 POWDER, FOR SUSPENSION ORAL ONCE
Refills: 0 | Status: COMPLETED | OUTPATIENT
Start: 2021-03-13 | End: 2021-03-13

## 2021-03-13 RX ORDER — DEXAMETHASONE 0.5 MG/5ML
12 ELIXIR ORAL DAILY
Refills: 0 | Status: DISCONTINUED | OUTPATIENT
Start: 2021-03-13 | End: 2021-03-23

## 2021-03-13 RX ADMIN — MORPHINE SULFATE 1 MILLIGRAM(S): 50 CAPSULE, EXTENDED RELEASE ORAL at 12:38

## 2021-03-13 RX ADMIN — BENZOCAINE AND MENTHOL 1 LOZENGE: 5; 1 LIQUID ORAL at 06:31

## 2021-03-13 RX ADMIN — TIOTROPIUM BROMIDE 1 CAPSULE(S): 18 CAPSULE ORAL; RESPIRATORY (INHALATION) at 09:33

## 2021-03-13 RX ADMIN — Medication 2: at 21:41

## 2021-03-13 RX ADMIN — SODIUM POLYSTYRENE SULFONATE 15 GRAM(S): 4.1 POWDER, FOR SUSPENSION ORAL at 16:10

## 2021-03-13 RX ADMIN — Medication 1: at 13:06

## 2021-03-13 RX ADMIN — BENZOCAINE AND MENTHOL 1 LOZENGE: 5; 1 LIQUID ORAL at 21:40

## 2021-03-13 RX ADMIN — Medication 5 MILLIGRAM(S): at 21:40

## 2021-03-13 RX ADMIN — PANTOPRAZOLE SODIUM 40 MILLIGRAM(S): 20 TABLET, DELAYED RELEASE ORAL at 06:28

## 2021-03-13 RX ADMIN — BUDESONIDE AND FORMOTEROL FUMARATE DIHYDRATE 2 PUFF(S): 160; 4.5 AEROSOL RESPIRATORY (INHALATION) at 20:43

## 2021-03-13 RX ADMIN — ENOXAPARIN SODIUM 40 MILLIGRAM(S): 100 INJECTION SUBCUTANEOUS at 06:28

## 2021-03-13 RX ADMIN — BUDESONIDE AND FORMOTEROL FUMARATE DIHYDRATE 2 PUFF(S): 160; 4.5 AEROSOL RESPIRATORY (INHALATION) at 09:32

## 2021-03-13 RX ADMIN — Medication 1 SPRAY(S): at 06:28

## 2021-03-13 RX ADMIN — MORPHINE SULFATE 1 MILLIGRAM(S): 50 CAPSULE, EXTENDED RELEASE ORAL at 00:45

## 2021-03-13 RX ADMIN — Medication 1 SPRAY(S): at 18:32

## 2021-03-13 RX ADMIN — Medication 12 MILLIGRAM(S): at 06:28

## 2021-03-13 RX ADMIN — ENOXAPARIN SODIUM 40 MILLIGRAM(S): 100 INJECTION SUBCUTANEOUS at 18:33

## 2021-03-13 RX ADMIN — MORPHINE SULFATE 1 MILLIGRAM(S): 50 CAPSULE, EXTENDED RELEASE ORAL at 00:37

## 2021-03-13 NOTE — PROGRESS NOTE ADULT - SUBJECTIVE AND OBJECTIVE BOX
Follow up:     INTERVAL HPI/OVERNIGHT EVENTS: Patient seen and examined with myGreekter Ipad at bedside. Was tachypneic on Hiflo NC this morning, now on BIpap. Complaints of central chest pain described as pressure associated with difficulty breathing.       Vital Signs Last 24 Hrs  T(C): 36.7 (13 Mar 2021 08:17), Max: 37.2 (12 Mar 2021 20:00)  T(F): 98.1 (13 Mar 2021 08:17), Max: 99 (12 Mar 2021 20:00)  HR: 111 (13 Mar 2021 12:36) (63 - 112)  BP: 116/77 (13 Mar 2021 12:34) (113/81 - 148/81)  BP(mean): --  RR: 31 (13 Mar 2021 12:34) (16 - 47)  SpO2: 95% (13 Mar 2021 12:36) (88% - 98%)    PHYSICAL EXAM:    GENERAL: NAD,AOX3 on BIpap   HEAD:  Atraumatic, Normocephalic  EYES: EOMI, PERRLA, conjunctiva and sclera clear  ENMT: Moist mucous membranes  CHEST/LUNG: Clear to auscultation bilaterally; No rales, rhonchi, wheezing, or rubs  HEART: +tachycardia; No murmurs, rubs, or gallops  ABDOMEN: Soft, Nontender, Nondistended; Bowel sounds present  EXTREMITIES:  2+ Peripheral Pulses, No clubbing, cyanosis, or edema        MEDICATIONS  (STANDING):  budesonide 160 MICROgram(s)/formoterol 4.5 MICROgram(s) Inhaler 2 Puff(s) Inhalation two times a day  dexAMETHasone     Tablet 12 milliGRAM(s) Oral daily  dextrose 40% Gel 15 Gram(s) Oral once  dextrose 5%. 1000 milliLiter(s) (50 mL/Hr) IV Continuous <Continuous>  dextrose 5%. 1000 milliLiter(s) (100 mL/Hr) IV Continuous <Continuous>  dextrose 50% Injectable 25 Gram(s) IV Push once  dextrose 50% Injectable 12.5 Gram(s) IV Push once  dextrose 50% Injectable 25 Gram(s) IV Push once  enoxaparin Injectable 40 milliGRAM(s) SubCutaneous every 12 hours  glucagon  Injectable 1 milliGRAM(s) IntraMuscular once  insulin lispro (ADMELOG) corrective regimen sliding scale   SubCutaneous three times a day before meals  insulin lispro (ADMELOG) corrective regimen sliding scale   SubCutaneous at bedtime  melatonin 5 milliGRAM(s) Oral at bedtime  pantoprazole    Tablet 40 milliGRAM(s) Oral before breakfast  sodium chloride 0.65% Nasal 1 Spray(s) Both Nostrils two times a day  sodium polystyrene sulfonate Suspension 15 Gram(s) Oral once  tiotropium 18 MICROgram(s) Capsule 1 Capsule(s) Inhalation daily    MEDICATIONS  (PRN):  acetaminophen   Tablet .. 650 milliGRAM(s) Oral every 6 hours PRN Mild Pain (1 - 3), Moderate Pain (4 - 6)  benzocaine 15 mG/menthol 3.6 mG (Sugar-Free) Lozenge 1 Lozenge Oral every 6 hours PRN Sore Throat  morphine   Solution 1 milliGRAM(s) Oral every 4 hours PRN Severe Pain (7 - 10) OR dyspnea  morphine  - Injectable 1 milliGRAM(s) IV Push every 6 hours PRN SOB  traMADol 50 milliGRAM(s) Oral every 8 hours PRN Moderate Pain (4 - 6)      Allergies    No Known Allergies    Intolerances          LABS:                          16.0   20.42 )-----------( 638      ( 13 Mar 2021 07:54 )             49.4     03-13    130<L>  |  90<L>  |  23.0<H>  ----------------------------<  83  5.9<H>   |  27.0  |  0.87    Ca    9.3      13 Mar 2021 07:54            RADIOLOGY & ADDITIONAL TESTS:

## 2021-03-13 NOTE — PROGRESS NOTE ADULT - ASSESSMENT
The patient is a 55 year old male with a past medical history of hypertension, hyperlipidemia and diabetes mellitus type 2 admitted for acute hypoxic respiratory failure secondary to COVID. Initially was admitted to ICU on BIpap, was weaned off BIpap to [facility] and transferred to Medicine on 3.1 Completed 10 days of IV remdesivir and decadron however remained hypoxia.  CTA imaging and LE dopplers neg for acute thromboembolic disease. Decadron was restarted at 12mg PO.     Assessment/Plan:    1. Chest pain: EKG with no acute changes  Stat D dimer- dimer trending up  Stat Lower extremity doppler  85mg Of Subcut lovenox x 1 now  Stat chest xray    2. Acute hypoxic respiratory failure secondary to COVID pneumonia: Now on bipap  DNI not DNR  COmpleted 10 days of IV decadron and remdesivir  Not a candidate for Actemra  On decadron 12mg PO OD now    3. Hyperkalemia: PO Kayexalate x 1  No Acute EKG changes noted    4. Hypertension: BP stable    5. History of chronic headaches on tramadol Q8 prn    6.  Prediabetes Hbaic of 6  HSS  Monitor BSl closely    Code status: DNI   NOT DNR

## 2021-03-13 NOTE — PROGRESS NOTE ADULT - SUBJECTIVE AND OBJECTIVE BOX
PULMONARY PROGRESS NOTE      [id number]    Patient is a 55y old  Male who presents with a chief complaint of covid hypoxia (12 Mar 2021 19:15)      INTERVAL HPI/OVERNIGHT EVENTS:  tachypneic on bipap  no overnight issues  MEDICATIONS  (STANDING):  budesonide 160 MICROgram(s)/formoterol 4.5 MICROgram(s) Inhaler 2 Puff(s) Inhalation two times a day  dexAMETHasone     Tablet 12 milliGRAM(s) Oral daily  dextrose 40% Gel 15 Gram(s) Oral once  dextrose 5%. 1000 milliLiter(s) (50 mL/Hr) IV Continuous <Continuous>  dextrose 5%. 1000 milliLiter(s) (100 mL/Hr) IV Continuous <Continuous>  dextrose 50% Injectable 25 Gram(s) IV Push once  dextrose 50% Injectable 12.5 Gram(s) IV Push once  dextrose 50% Injectable 25 Gram(s) IV Push once  enoxaparin Injectable 40 milliGRAM(s) SubCutaneous every 12 hours  glucagon  Injectable 1 milliGRAM(s) IntraMuscular once  insulin lispro (ADMELOG) corrective regimen sliding scale   SubCutaneous three times a day before meals  insulin lispro (ADMELOG) corrective regimen sliding scale   SubCutaneous at bedtime  melatonin 5 milliGRAM(s) Oral at bedtime  pantoprazole    Tablet 40 milliGRAM(s) Oral before breakfast  sodium chloride 0.65% Nasal 1 Spray(s) Both Nostrils two times a day  tiotropium 18 MICROgram(s) Capsule 1 Capsule(s) Inhalation daily      MEDICATIONS  (PRN):  acetaminophen   Tablet .. 650 milliGRAM(s) Oral every 6 hours PRN Mild Pain (1 - 3), Moderate Pain (4 - 6)  benzocaine 15 mG/menthol 3.6 mG (Sugar-Free) Lozenge 1 Lozenge Oral every 6 hours PRN Sore Throat  morphine   Solution 1 milliGRAM(s) Oral every 4 hours PRN Severe Pain (7 - 10) OR dyspnea  morphine  - Injectable 1 milliGRAM(s) IV Push every 6 hours PRN SOB  traMADol 50 milliGRAM(s) Oral every 8 hours PRN Moderate Pain (4 - 6)      Allergies    No Known Allergies    Intolerances        PAST MEDICAL & SURGICAL HISTORY:  Hypertension    No significant past surgical history        SOCIAL HISTORY  Smoking History:       REVIEW OF SYSTEMS:    CONSTITUTIONAL:  No distress    HEENT:  Eyes:  No diplopia or blurred vision. ENT:  No earache, sore throat or runny nose.    CARDIOVASCULAR:  No pressure, squeezing, tightness, heaviness or aching about the chest; no palpitations.    RESPIRATORY:  see HPI    GASTROINTESTINAL:  No nausea, vomiting or diarrhea.    GENITOURINARY:  No dysuria, frequency or urgency.    NEUROLOGIC:  No paresthesias, fasciculations, seizures or weakness.    PSYCHIATRIC:  No disorder of thought or mood.    Vital Signs Last 24 Hrs  T(C): 36.7 (13 Mar 2021 08:17), Max: 37.2 (12 Mar 2021 20:00)  T(F): 98.1 (13 Mar 2021 08:17), Max: 99 (12 Mar 2021 20:00)  HR: 109 (13 Mar 2021 09:34) (63 - 112)  BP: 118/75 (13 Mar 2021 08:17) (113/81 - 148/81)  BP(mean): --  RR: 16 (13 Mar 2021 08:17) (16 - 24)  SpO2: 90% (13 Mar 2021 09:34) (88% - 98%)    PHYSICAL EXAMINATION:      NEUROLOGIC: Grossly intact.    LABS:                        16.0   20.42 )-----------( 638      ( 13 Mar 2021 07:54 )             49.4     03-13    130<L>  |  90<L>  |  23.0<H>  ----------------------------<  83  5.9<H>   |  27.0  |  0.87    Ca    9.3      13 Mar 2021 07:54                      Procalcitonin, Serum: 0.15 ng/mL (03-12-21 @ 06:59)  Procalcitonin, Serum: 0.18 ng/mL (03-11-21 @ 07:34)      MICROBIOLOGY:    RADIOLOGY & ADDITIONAL STUDIES:  cxr, CTA reviewed

## 2021-03-14 LAB
ALBUMIN SERPL ELPH-MCNC: 2.5 G/DL — LOW (ref 3.3–5.2)
ALP SERPL-CCNC: 83 U/L — SIGNIFICANT CHANGE UP (ref 40–120)
ALT FLD-CCNC: 28 U/L — SIGNIFICANT CHANGE UP
ANION GAP SERPL CALC-SCNC: 12 MMOL/L — SIGNIFICANT CHANGE UP (ref 5–17)
AST SERPL-CCNC: 27 U/L — SIGNIFICANT CHANGE UP
BILIRUB SERPL-MCNC: 0.3 MG/DL — LOW (ref 0.4–2)
BUN SERPL-MCNC: 20 MG/DL — SIGNIFICANT CHANGE UP (ref 8–20)
CALCIUM SERPL-MCNC: 8.4 MG/DL — LOW (ref 8.6–10.2)
CHLORIDE SERPL-SCNC: 92 MMOL/L — LOW (ref 98–107)
CO2 SERPL-SCNC: 29 MMOL/L — SIGNIFICANT CHANGE UP (ref 22–29)
CREAT SERPL-MCNC: 0.7 MG/DL — SIGNIFICANT CHANGE UP (ref 0.5–1.3)
GLUCOSE BLDC GLUCOMTR-MCNC: 107 MG/DL — HIGH (ref 70–99)
GLUCOSE BLDC GLUCOMTR-MCNC: 130 MG/DL — HIGH (ref 70–99)
GLUCOSE BLDC GLUCOMTR-MCNC: 148 MG/DL — HIGH (ref 70–99)
GLUCOSE BLDC GLUCOMTR-MCNC: 152 MG/DL — HIGH (ref 70–99)
GLUCOSE SERPL-MCNC: 93 MG/DL — SIGNIFICANT CHANGE UP (ref 70–99)
HCT VFR BLD CALC: 41.6 % — SIGNIFICANT CHANGE UP (ref 39–50)
HGB BLD-MCNC: 13.6 G/DL — SIGNIFICANT CHANGE UP (ref 13–17)
MCHC RBC-ENTMCNC: 29.4 PG — SIGNIFICANT CHANGE UP (ref 27–34)
MCHC RBC-ENTMCNC: 32.7 GM/DL — SIGNIFICANT CHANGE UP (ref 32–36)
MCV RBC AUTO: 89.8 FL — SIGNIFICANT CHANGE UP (ref 80–100)
PLATELET # BLD AUTO: 615 K/UL — HIGH (ref 150–400)
POTASSIUM SERPL-MCNC: 3.9 MMOL/L — SIGNIFICANT CHANGE UP (ref 3.5–5.3)
POTASSIUM SERPL-SCNC: 3.9 MMOL/L — SIGNIFICANT CHANGE UP (ref 3.5–5.3)
PROT SERPL-MCNC: 6.7 G/DL — SIGNIFICANT CHANGE UP (ref 6.6–8.7)
RBC # BLD: 4.63 M/UL — SIGNIFICANT CHANGE UP (ref 4.2–5.8)
RBC # FLD: 12.7 % — SIGNIFICANT CHANGE UP (ref 10.3–14.5)
SODIUM SERPL-SCNC: 133 MMOL/L — LOW (ref 135–145)
WBC # BLD: 21.03 K/UL — HIGH (ref 3.8–10.5)
WBC # FLD AUTO: 21.03 K/UL — HIGH (ref 3.8–10.5)

## 2021-03-14 PROCEDURE — 99233 SBSQ HOSP IP/OBS HIGH 50: CPT

## 2021-03-14 PROCEDURE — 99291 CRITICAL CARE FIRST HOUR: CPT

## 2021-03-14 PROCEDURE — 71045 X-RAY EXAM CHEST 1 VIEW: CPT | Mod: 26

## 2021-03-14 RX ADMIN — TRAMADOL HYDROCHLORIDE 50 MILLIGRAM(S): 50 TABLET ORAL at 06:58

## 2021-03-14 RX ADMIN — PANTOPRAZOLE SODIUM 40 MILLIGRAM(S): 20 TABLET, DELAYED RELEASE ORAL at 06:45

## 2021-03-14 RX ADMIN — TIOTROPIUM BROMIDE 1 CAPSULE(S): 18 CAPSULE ORAL; RESPIRATORY (INHALATION) at 08:10

## 2021-03-14 RX ADMIN — Medication 106 MILLIGRAM(S): at 06:45

## 2021-03-14 RX ADMIN — Medication 1: at 18:19

## 2021-03-14 RX ADMIN — BENZOCAINE AND MENTHOL 1 LOZENGE: 5; 1 LIQUID ORAL at 15:41

## 2021-03-14 RX ADMIN — Medication 5 MILLIGRAM(S): at 21:39

## 2021-03-14 RX ADMIN — BUDESONIDE AND FORMOTEROL FUMARATE DIHYDRATE 2 PUFF(S): 160; 4.5 AEROSOL RESPIRATORY (INHALATION) at 21:47

## 2021-03-14 RX ADMIN — ENOXAPARIN SODIUM 40 MILLIGRAM(S): 100 INJECTION SUBCUTANEOUS at 06:46

## 2021-03-14 RX ADMIN — Medication 1 SPRAY(S): at 06:46

## 2021-03-14 RX ADMIN — BUDESONIDE AND FORMOTEROL FUMARATE DIHYDRATE 2 PUFF(S): 160; 4.5 AEROSOL RESPIRATORY (INHALATION) at 08:10

## 2021-03-14 RX ADMIN — ENOXAPARIN SODIUM 40 MILLIGRAM(S): 100 INJECTION SUBCUTANEOUS at 18:18

## 2021-03-14 NOTE — PROGRESS NOTE ADULT - SUBJECTIVE AND OBJECTIVE BOX
PULMONARY PROGRESS NOTE      [id number]    Patient is a 55y old  Male who presents with a chief complaint of covid hypoxia (13 Mar 2021 13:45)      INTERVAL HPI/OVERNIGHT EVENTS:  sitting up in chair on high flow 100%, sp02 98%  denies any CP  on bipap overnight  MEDICATIONS  (STANDING):  budesonide 160 MICROgram(s)/formoterol 4.5 MICROgram(s) Inhaler 2 Puff(s) Inhalation two times a day  dexAMETHasone  IVPB 12 milliGRAM(s) IV Intermittent daily  dextrose 40% Gel 15 Gram(s) Oral once  dextrose 5%. 1000 milliLiter(s) (50 mL/Hr) IV Continuous <Continuous>  dextrose 5%. 1000 milliLiter(s) (100 mL/Hr) IV Continuous <Continuous>  dextrose 50% Injectable 25 Gram(s) IV Push once  dextrose 50% Injectable 12.5 Gram(s) IV Push once  dextrose 50% Injectable 25 Gram(s) IV Push once  enoxaparin Injectable 40 milliGRAM(s) SubCutaneous every 12 hours  glucagon  Injectable 1 milliGRAM(s) IntraMuscular once  insulin lispro (ADMELOG) corrective regimen sliding scale   SubCutaneous three times a day before meals  insulin lispro (ADMELOG) corrective regimen sliding scale   SubCutaneous at bedtime  melatonin 5 milliGRAM(s) Oral at bedtime  pantoprazole    Tablet 40 milliGRAM(s) Oral before breakfast  sodium chloride 0.65% Nasal 1 Spray(s) Both Nostrils two times a day  tiotropium 18 MICROgram(s) Capsule 1 Capsule(s) Inhalation daily      MEDICATIONS  (PRN):  acetaminophen   Tablet .. 650 milliGRAM(s) Oral every 6 hours PRN Mild Pain (1 - 3), Moderate Pain (4 - 6)  benzocaine 15 mG/menthol 3.6 mG (Sugar-Free) Lozenge 1 Lozenge Oral every 6 hours PRN Sore Throat  morphine   Solution 1 milliGRAM(s) Oral every 4 hours PRN Severe Pain (7 - 10) OR dyspnea  morphine  - Injectable 1 milliGRAM(s) IV Push every 6 hours PRN SOB  traMADol 50 milliGRAM(s) Oral every 8 hours PRN Moderate Pain (4 - 6)      Allergies    No Known Allergies    Intolerances        PAST MEDICAL & SURGICAL HISTORY:  Hypertension    No significant past surgical history        SOCIAL HISTORY  Smoking History:       REVIEW OF SYSTEMS:    CONSTITUTIONAL:  No distress    HEENT:  Eyes:  No diplopia or blurred vision. ENT:  No earache, sore throat or runny nose.    CARDIOVASCULAR:  No pressure, squeezing, tightness, heaviness or aching about the chest; no palpitations.    RESPIRATORY:  see hpi    GASTROINTESTINAL:  No nausea, vomiting or diarrhea.    GENITOURINARY:  No dysuria, frequency or urgency.    NEUROLOGIC:  No paresthesias, fasciculations, seizures or weakness.    PSYCHIATRIC:  No disorder of thought or mood.    Vital Signs Last 24 Hrs  T(C): 36.8 (14 Mar 2021 07:53), Max: 37.2 (14 Mar 2021 05:26)  T(F): 98.3 (14 Mar 2021 07:53), Max: 99 (14 Mar 2021 05:26)  HR: 87 (14 Mar 2021 08:10) (78 - 111)  BP: 132/83 (14 Mar 2021 07:53) (113/75 - 135/84)  BP(mean): --  RR: 18 (14 Mar 2021 07:53) (18 - 38)  SpO2: 91% (14 Mar 2021 08:10) (91% - 98%)    PHYSICAL EXAMINATION:    GENERAL: The patient is awake and alert in no apparent distress.             NEUROLOGIC: Grossly intact.    LABS:                        13.6   21.03 )-----------( 615      ( 14 Mar 2021 06:49 )             41.6     03-14    133<L>  |  92<L>  |  20.0  ----------------------------<  93  3.9   |  29.0  |  0.70    Ca    8.4<L>      14 Mar 2021 06:49    TPro  6.7  /  Alb  2.5<L>  /  TBili  0.3<L>  /  DBili  x   /  AST  27  /  ALT  28  /  AlkPhos  83  03-14              D-Dimer Assay, Quantitative: 419 ng/mL DDU (03-13-21 @ 13:25)        Procalcitonin, Serum: 0.15 ng/mL (03-12-21 @ 06:59)      MICROBIOLOGY:    RADIOLOGY & ADDITIONAL STUDIES:  CXR / duplex

## 2021-03-14 NOTE — PROGRESS NOTE ADULT - SUBJECTIVE AND OBJECTIVE BOX
CC: Follow up    INTERVAL HPI/OVERNIGHT EVENTS: Patient seen and examined, seen with  ipad. States chest pain resolved. On Hiflo NC at 80%      Vital Signs Last 24 Hrs  T(C): 36.8 (14 Mar 2021 07:53), Max: 37.2 (14 Mar 2021 05:26)  T(F): 98.3 (14 Mar 2021 07:53), Max: 99 (14 Mar 2021 05:26)  HR: 87 (14 Mar 2021 08:10) (78 - 111)  BP: 132/83 (14 Mar 2021 07:53) (113/75 - 135/84)  BP(mean): --  RR: 18 (14 Mar 2021 07:53) (18 - 38)  SpO2: 91% (14 Mar 2021 08:10) (91% - 98%)    PHYSICAL EXAM:    GENERAL: NAD, AOX3 tachypneic   HEAD:  Atraumatic, Normocephalic  EYES:  conjunctiva and sclera clear  ENMT: Moist mucous membranes  NECK: Supple, No JVD  CHEST/LUNG: Clear to auscultation bilaterally; No rales, rhonchi, wheezing, or rubs  HEART: Regular rate and rhythm; No murmurs, rubs, or gallops  ABDOMEN: Soft, Nontender, Nondistended; Bowel sounds present  EXTREMITIES:  2+ Peripheral Pulses, No clubbing, cyanosis, or edema        MEDICATIONS  (STANDING):  budesonide 160 MICROgram(s)/formoterol 4.5 MICROgram(s) Inhaler 2 Puff(s) Inhalation two times a day  dexAMETHasone  IVPB 12 milliGRAM(s) IV Intermittent daily  dextrose 40% Gel 15 Gram(s) Oral once  dextrose 5%. 1000 milliLiter(s) (50 mL/Hr) IV Continuous <Continuous>  dextrose 5%. 1000 milliLiter(s) (100 mL/Hr) IV Continuous <Continuous>  dextrose 50% Injectable 25 Gram(s) IV Push once  dextrose 50% Injectable 12.5 Gram(s) IV Push once  dextrose 50% Injectable 25 Gram(s) IV Push once  enoxaparin Injectable 40 milliGRAM(s) SubCutaneous every 12 hours  glucagon  Injectable 1 milliGRAM(s) IntraMuscular once  insulin lispro (ADMELOG) corrective regimen sliding scale   SubCutaneous three times a day before meals  insulin lispro (ADMELOG) corrective regimen sliding scale   SubCutaneous at bedtime  melatonin 5 milliGRAM(s) Oral at bedtime  pantoprazole    Tablet 40 milliGRAM(s) Oral before breakfast  sodium chloride 0.65% Nasal 1 Spray(s) Both Nostrils two times a day  tiotropium 18 MICROgram(s) Capsule 1 Capsule(s) Inhalation daily    MEDICATIONS  (PRN):  acetaminophen   Tablet .. 650 milliGRAM(s) Oral every 6 hours PRN Mild Pain (1 - 3), Moderate Pain (4 - 6)  benzocaine 15 mG/menthol 3.6 mG (Sugar-Free) Lozenge 1 Lozenge Oral every 6 hours PRN Sore Throat  morphine   Solution 1 milliGRAM(s) Oral every 4 hours PRN Severe Pain (7 - 10) OR dyspnea  morphine  - Injectable 1 milliGRAM(s) IV Push every 6 hours PRN SOB  traMADol 50 milliGRAM(s) Oral every 8 hours PRN Moderate Pain (4 - 6)      Allergies    No Known Allergies    Intolerances          LABS:                          13.6   21.03 )-----------( 615      ( 14 Mar 2021 06:49 )             41.6     03-14    133<L>  |  92<L>  |  20.0  ----------------------------<  93  3.9   |  29.0  |  0.70    Ca    8.4<L>      14 Mar 2021 06:49    TPro  6.7  /  Alb  2.5<L>  /  TBili  0.3<L>  /  DBili  x   /  AST  27  /  ALT  28  /  AlkPhos  83  03-14          RADIOLOGY & ADDITIONAL TESTS:

## 2021-03-14 NOTE — PROGRESS NOTE ADULT - ASSESSMENT
acute hypoxic respiratory failure , covid- 19 pneumonia  Post remdesivir, remains on decadron  Not given Actemra in past, now not candidate  remains on Bipap/high flow currently now down to 80% Fio02- sp02 92%  bipap 12/6, nocturnal  continue self proning, wean fio2 as able  follow inflammatory markers  on lovenox 40 bid, previous DVT/PE excluded  critical, remains on high level of support, DNI        acute hypoxic respiratory failure , covid- 19 pneumonia  Post remdesivir, remains on decadron  Not given Actemra in past, now not candidate  remains on Bipap/high flow currently now down to 80% Fio02- sp02 92%  bipap 12/6, nocturnal  continue self proning, wean fio2 as able  follow inflammatory markers  repeat CXR today   on lovenox 40 bid, previous DVT/PE excluded  critical, remains on high level of support, DNI

## 2021-03-14 NOTE — PROGRESS NOTE ADULT - ASSESSMENT
The patient is a 55 year old male with a past medical history of hypertension, hyperlipidemia and diabetes mellitus type 2 admitted for acute hypoxic respiratory failure secondary to COVID. Initially was admitted to ICU on BIpap, was weaned off BIpap to Hiflo NC and transferred to Medicine on 3.1 Completed 10 days of IV remdesivir and decadron however remained hypoxia.  CTA imaging and LE dopplers neg for acute thromboembolic disease. Decadron was restarted at 12mg PO.     Assessment/Plan:    1.  Acute hypoxic respiratory failure secondary to COVID pneumonia On hiflo  DNI not DNR  COmpleted 10 days of IV decadron and remdesivir  Not a candidate for Actemra  On decadron 12mg PO OD day 5  Duplex negative fo dvt  Chest xray reviewed  Nocturnal bipap     2. Hyperkalemia: resolved    3. Hypertension: BP stable    4. History of chronic headaches on tramadol Q8 prn    5.  Prediabetes Hbaic of 6  HSS  Monitor BSl closely    Code status: DNI   NOT DNR

## 2021-03-15 LAB
ALBUMIN SERPL ELPH-MCNC: 2.7 G/DL — LOW (ref 3.3–5.2)
ALP SERPL-CCNC: 82 U/L — SIGNIFICANT CHANGE UP (ref 40–120)
ALT FLD-CCNC: 25 U/L — SIGNIFICANT CHANGE UP
ANION GAP SERPL CALC-SCNC: 15 MMOL/L — SIGNIFICANT CHANGE UP (ref 5–17)
AST SERPL-CCNC: 26 U/L — SIGNIFICANT CHANGE UP
BILIRUB SERPL-MCNC: 0.3 MG/DL — LOW (ref 0.4–2)
BUN SERPL-MCNC: 17 MG/DL — SIGNIFICANT CHANGE UP (ref 8–20)
CALCIUM SERPL-MCNC: 8.5 MG/DL — LOW (ref 8.6–10.2)
CHLORIDE SERPL-SCNC: 94 MMOL/L — LOW (ref 98–107)
CO2 SERPL-SCNC: 27 MMOL/L — SIGNIFICANT CHANGE UP (ref 22–29)
CREAT SERPL-MCNC: 0.58 MG/DL — SIGNIFICANT CHANGE UP (ref 0.5–1.3)
D DIMER BLD IA.RAPID-MCNC: 602 NG/ML DDU — HIGH
FERRITIN SERPL-MCNC: 652 NG/ML — HIGH (ref 30–400)
GLUCOSE BLDC GLUCOMTR-MCNC: 117 MG/DL — HIGH (ref 70–99)
GLUCOSE BLDC GLUCOMTR-MCNC: 123 MG/DL — HIGH (ref 70–99)
GLUCOSE BLDC GLUCOMTR-MCNC: 125 MG/DL — HIGH (ref 70–99)
GLUCOSE BLDC GLUCOMTR-MCNC: 135 MG/DL — HIGH (ref 70–99)
GLUCOSE SERPL-MCNC: 87 MG/DL — SIGNIFICANT CHANGE UP (ref 70–99)
HCT VFR BLD CALC: 43.5 % — SIGNIFICANT CHANGE UP (ref 39–50)
HGB BLD-MCNC: 14.1 G/DL — SIGNIFICANT CHANGE UP (ref 13–17)
LDH SERPL L TO P-CCNC: 431 U/L — HIGH (ref 98–192)
MCHC RBC-ENTMCNC: 28.8 PG — SIGNIFICANT CHANGE UP (ref 27–34)
MCHC RBC-ENTMCNC: 32.4 GM/DL — SIGNIFICANT CHANGE UP (ref 32–36)
MCV RBC AUTO: 88.8 FL — SIGNIFICANT CHANGE UP (ref 80–100)
PLATELET # BLD AUTO: 568 K/UL — HIGH (ref 150–400)
POTASSIUM SERPL-MCNC: 4.1 MMOL/L — SIGNIFICANT CHANGE UP (ref 3.5–5.3)
POTASSIUM SERPL-SCNC: 4.1 MMOL/L — SIGNIFICANT CHANGE UP (ref 3.5–5.3)
PROCALCITONIN SERPL-MCNC: 0.1 NG/ML — SIGNIFICANT CHANGE UP (ref 0.02–0.1)
PROT SERPL-MCNC: 6.9 G/DL — SIGNIFICANT CHANGE UP (ref 6.6–8.7)
RBC # BLD: 4.9 M/UL — SIGNIFICANT CHANGE UP (ref 4.2–5.8)
RBC # FLD: 12.7 % — SIGNIFICANT CHANGE UP (ref 10.3–14.5)
SODIUM SERPL-SCNC: 135 MMOL/L — SIGNIFICANT CHANGE UP (ref 135–145)
WBC # BLD: 21.71 K/UL — HIGH (ref 3.8–10.5)
WBC # FLD AUTO: 21.71 K/UL — HIGH (ref 3.8–10.5)

## 2021-03-15 PROCEDURE — 99233 SBSQ HOSP IP/OBS HIGH 50: CPT

## 2021-03-15 PROCEDURE — 99291 CRITICAL CARE FIRST HOUR: CPT

## 2021-03-15 RX ADMIN — ENOXAPARIN SODIUM 40 MILLIGRAM(S): 100 INJECTION SUBCUTANEOUS at 18:20

## 2021-03-15 RX ADMIN — ENOXAPARIN SODIUM 40 MILLIGRAM(S): 100 INJECTION SUBCUTANEOUS at 05:27

## 2021-03-15 RX ADMIN — PANTOPRAZOLE SODIUM 40 MILLIGRAM(S): 20 TABLET, DELAYED RELEASE ORAL at 05:28

## 2021-03-15 RX ADMIN — BUDESONIDE AND FORMOTEROL FUMARATE DIHYDRATE 2 PUFF(S): 160; 4.5 AEROSOL RESPIRATORY (INHALATION) at 09:31

## 2021-03-15 RX ADMIN — Medication 1 SPRAY(S): at 18:20

## 2021-03-15 RX ADMIN — BUDESONIDE AND FORMOTEROL FUMARATE DIHYDRATE 2 PUFF(S): 160; 4.5 AEROSOL RESPIRATORY (INHALATION) at 19:46

## 2021-03-15 RX ADMIN — Medication 1 SPRAY(S): at 05:30

## 2021-03-15 RX ADMIN — Medication 106 MILLIGRAM(S): at 05:27

## 2021-03-15 RX ADMIN — TIOTROPIUM BROMIDE 1 CAPSULE(S): 18 CAPSULE ORAL; RESPIRATORY (INHALATION) at 10:14

## 2021-03-15 RX ADMIN — Medication 5 MILLIGRAM(S): at 21:03

## 2021-03-15 NOTE — PROGRESS NOTE ADULT - ASSESSMENT
The patient is a 55 year old male with a past medical history of hypertension, hyperlipidemia and diabetes mellitus type 2 admitted for acute hypoxic respiratory failure secondary to COVID. Initially was admitted to ICU on BIpap, was weaned off BIpap to Hiflo NC and transferred to Medicine on 3.1 Completed 10 days of IV remdesivir and decadron however remained hypoxia.  CTA imaging and LE dopplers neg for acute thromboembolic disease. Decadron was restarted at 12mg PO.     Assessment/Plan:    1.  Acute hypoxic respiratory failure secondary to COVID pneumonia On hiflo  DNI not DNR  COmpleted 10 days of IV decadron and remdesivir  Not a candidate for Actemra  On decadron 12mg PO OD day 6  Duplex negative fo dvt  Chest xray reviewed  Nocturnal bipap     2. Hyperkalemia: resolved    3. Hypertension: BP stable    4. History of chronic headaches on tramadol Q8 prn    5.  Prediabetes Hbaic of 6  HSS  Monitor BSl closely    Code status: DNI   NOT DNR

## 2021-03-15 NOTE — PROGRESS NOTE ADULT - SUBJECTIVE AND OBJECTIVE BOX
CC: Follow up    INTERVAL HPI/OVERNIGHT EVENTS: Patient seen and examined, remains on hiflo at 60%. Appears more comfortable today       Vital Signs Last 24 Hrs  T(C): 37.1 (15 Mar 2021 08:17), Max: 37.1 (15 Mar 2021 08:17)  T(F): 98.7 (15 Mar 2021 08:17), Max: 98.7 (15 Mar 2021 08:17)  HR: 115 (15 Mar 2021 09:32) (82 - 115)  BP: 119/76 (15 Mar 2021 08:17) (119/76 - 128/83)  BP(mean): --  RR: 22 (15 Mar 2021 08:17) (20 - 31)  SpO2: 93% (15 Mar 2021 09:32) (92% - 96%)    PHYSICAL EXAM:    GENERAL: NAD, AOX3  HEAD:  Atraumatic, Normocephalic  EYES: EOMI, PERRLA, conjunctiva and sclera clear  ENMT: Moist mucous membranes  CHEST/LUNG: Clear to auscultation bilaterally; No rales, rhonchi, wheezing, or rubs  HEART: Regular rate and rhythm; No murmurs, rubs, or gallops  ABDOMEN: Soft, Nontender, Nondistended; Bowel sounds present  EXTREMITIES:  2+ Peripheral Pulses, No clubbing, cyanosis, or edema        MEDICATIONS  (STANDING):  budesonide 160 MICROgram(s)/formoterol 4.5 MICROgram(s) Inhaler 2 Puff(s) Inhalation two times a day  dexAMETHasone  IVPB 12 milliGRAM(s) IV Intermittent daily  dextrose 40% Gel 15 Gram(s) Oral once  dextrose 5%. 1000 milliLiter(s) (50 mL/Hr) IV Continuous <Continuous>  dextrose 5%. 1000 milliLiter(s) (100 mL/Hr) IV Continuous <Continuous>  dextrose 50% Injectable 25 Gram(s) IV Push once  dextrose 50% Injectable 12.5 Gram(s) IV Push once  dextrose 50% Injectable 25 Gram(s) IV Push once  enoxaparin Injectable 40 milliGRAM(s) SubCutaneous every 12 hours  glucagon  Injectable 1 milliGRAM(s) IntraMuscular once  insulin lispro (ADMELOG) corrective regimen sliding scale   SubCutaneous three times a day before meals  insulin lispro (ADMELOG) corrective regimen sliding scale   SubCutaneous at bedtime  melatonin 5 milliGRAM(s) Oral at bedtime  pantoprazole    Tablet 40 milliGRAM(s) Oral before breakfast  sodium chloride 0.65% Nasal 1 Spray(s) Both Nostrils two times a day  tiotropium 18 MICROgram(s) Capsule 1 Capsule(s) Inhalation daily    MEDICATIONS  (PRN):  acetaminophen   Tablet .. 650 milliGRAM(s) Oral every 6 hours PRN Mild Pain (1 - 3), Moderate Pain (4 - 6)  benzocaine 15 mG/menthol 3.6 mG (Sugar-Free) Lozenge 1 Lozenge Oral every 6 hours PRN Sore Throat  morphine   Solution 1 milliGRAM(s) Oral every 4 hours PRN Severe Pain (7 - 10) OR dyspnea  morphine  - Injectable 1 milliGRAM(s) IV Push every 6 hours PRN SOB  traMADol 50 milliGRAM(s) Oral every 8 hours PRN Moderate Pain (4 - 6)      Allergies    No Known Allergies    Intolerances          LABS:                          14.1   21.71 )-----------( 568      ( 15 Mar 2021 06:36 )             43.5     03-15    135  |  94<L>  |  17.0  ----------------------------<  87  4.1   |  27.0  |  0.58    Ca    8.5<L>      15 Mar 2021 06:36    TPro  6.9  /  Alb  2.7<L>  /  TBili  0.3<L>  /  DBili  x   /  AST  26  /  ALT  25  /  AlkPhos  82  03-15          RADIOLOGY & ADDITIONAL TESTS:

## 2021-03-15 NOTE — PROGRESS NOTE ADULT - ASSESSMENT
Acute hypoxic respiratory failure  Covid- 19 pneumonia  Post remdesivir, remains on decadron  Not given Actemra in past, now not candidate    Rec:    Remains on Bipap/high flow - adjust pressures as tolerates  Bipap 12/6, nocturnal  Continue self proning, wean fio2 as able  Follow inflammatory markers  Follow imaging studies for improvement   On lovenox 40 bid, previous DVT/PE excluded  Critical  Remains on high level of support  DNI   Acute hypoxic respiratory failure  Covid- 19 pneumonia  Post remdesivir, remains on decadron  Not given Actemra in past, now not candidate    Rec:    Remains on Bipap/high flow - adjust pressures as tolerates  Bipap 12/6, nocturnal  Continue self proning, wean fio2 as able  Continue steroids for now  Follow inflammatory markers  Follow imaging studies for improvement   On lovenox 40 bid, previous DVT/PE excluded  Critical  Remains on high level of support  DNI

## 2021-03-15 NOTE — PROGRESS NOTE ADULT - SUBJECTIVE AND OBJECTIVE BOX
PULMONARY PROGRESS NOTE      BECKY GUERRA  MRN-[id number]    Patient is a 55y old  Male who presents with a chief complaint of covid hypoxia (15 Mar 2021 10:12)      INTERVAL HPI/OVERNIGHT EVENTS:    Pt is awake and alert  Tolerating HFO2    MEDICATIONS  (STANDING):  budesonide 160 MICROgram(s)/formoterol 4.5 MICROgram(s) Inhaler 2 Puff(s) Inhalation two times a day  dexAMETHasone  IVPB 12 milliGRAM(s) IV Intermittent daily  dextrose 40% Gel 15 Gram(s) Oral once  dextrose 5%. 1000 milliLiter(s) (50 mL/Hr) IV Continuous <Continuous>  dextrose 5%. 1000 milliLiter(s) (100 mL/Hr) IV Continuous <Continuous>  dextrose 50% Injectable 25 Gram(s) IV Push once  dextrose 50% Injectable 12.5 Gram(s) IV Push once  dextrose 50% Injectable 25 Gram(s) IV Push once  enoxaparin Injectable 40 milliGRAM(s) SubCutaneous every 12 hours  glucagon  Injectable 1 milliGRAM(s) IntraMuscular once  insulin lispro (ADMELOG) corrective regimen sliding scale   SubCutaneous three times a day before meals  insulin lispro (ADMELOG) corrective regimen sliding scale   SubCutaneous at bedtime  melatonin 5 milliGRAM(s) Oral at bedtime  pantoprazole    Tablet 40 milliGRAM(s) Oral before breakfast  sodium chloride 0.65% Nasal 1 Spray(s) Both Nostrils two times a day  tiotropium 18 MICROgram(s) Capsule 1 Capsule(s) Inhalation daily      MEDICATIONS  (PRN):  acetaminophen   Tablet .. 650 milliGRAM(s) Oral every 6 hours PRN Mild Pain (1 - 3), Moderate Pain (4 - 6)  benzocaine 15 mG/menthol 3.6 mG (Sugar-Free) Lozenge 1 Lozenge Oral every 6 hours PRN Sore Throat  morphine   Solution 1 milliGRAM(s) Oral every 4 hours PRN Severe Pain (7 - 10) OR dyspnea  morphine  - Injectable 1 milliGRAM(s) IV Push every 6 hours PRN SOB  traMADol 50 milliGRAM(s) Oral every 8 hours PRN Moderate Pain (4 - 6)      Allergies    No Known Allergies    Intolerances        PAST MEDICAL & SURGICAL HISTORY:  Hypertension    No significant past surgical history          REVIEW OF SYSTEMS:    CONSTITUTIONAL:  No distress    HEENT:  Eyes:  No diplopia or blurred vision. ENT:  No earache, sore throat or runny nose.    CARDIOVASCULAR:  No pressure, squeezing, tightness, heaviness or aching about the chest; no palpitations.    RESPIRATORY:  Improved cough, shortness of breath, no PND or orthopnea. + SOBOE    GASTROINTESTINAL:  No nausea, vomiting or diarrhea.    GENITOURINARY:  No dysuria, frequency or urgency.    NEUROLOGIC:  No paresthesias, fasciculations, seizures or weakness.    PSYCHIATRIC:  No disorder of thought or mood.    Vital Signs Last 24 Hrs  T(C): 37.1 (15 Mar 2021 16:24), Max: 37.1 (15 Mar 2021 08:17)  T(F): 98.7 (15 Mar 2021 16:24), Max: 98.7 (15 Mar 2021 08:17)  HR: 68 (15 Mar 2021 16:24) (68 - 115)  BP: 117/69 (15 Mar 2021 16:24) (117/69 - 122/74)  BP(mean): --  RR: 22 (15 Mar 2021 16:24) (22 - 24)  SpO2: 91% (15 Mar 2021 16:24) (85% - 98%)    PHYSICAL EXAMINATION:    GENERAL: The patient is awake and alert in no apparent distress. Obese    HEENT: Head is normocephalic and atraumatic. Extraocular muscles are intact. Mucous membranes are moist.    NECK: Supple.    LUNGS: Clear to auscultation without wheezing, rales or rhonchi; respirations unlabored    HEART: Regular rate and rhythm without murmur.    ABDOMEN: Soft, nontender, and nondistended.      EXTREMITIES: Without any cyanosis, clubbing, rash, lesions or edema.    NEUROLOGIC: Grossly intact.    LABS:                        14.1   21.71 )-----------( 568      ( 15 Mar 2021 06:36 )             43.5     03-15    135  |  94<L>  |  17.0  ----------------------------<  87  4.1   |  27.0  |  0.58    Ca    8.5<L>      15 Mar 2021 06:36    TPro  6.9  /  Alb  2.7<L>  /  TBili  0.3<L>  /  DBili  x   /  AST  26  /  ALT  25  /  AlkPhos  82  03-15        D-Dimer Assay, Quantitative: 602 ng/mL DDU (03-15-21 @ 06:36)      Procalcitonin, Serum: 0.10 ng/mL (03-15-21 @ 06:36)    MICROBIOLOGY:    COVID-19 PCR . (02.27.21 @ 22:15)    COVID-19 PCR: Detected: TYPE:(C=Critical, N=Notification, A=Abnormal) C  TESTS: COVID-19 PCR  DATE/TIME CALLED: 02/28/2021 00:20:59 EST  CALLED TO: RN: BRITTON  READ BACK (2 Patient Identifiers)(Y/N): Y  READ BACK VALUES (Y/N): Y  CALLED BY: AB  You can help in the fight against COVID-19. [facility] may contact  you to see if you are interested in voluntarily participating in one of  our clinical trials.  Testing is performed using polymerase chain reaction (PCR) or  transcription mediated amplification (TMA). This COVID-19 (SARS-CoV-2)  nucleic acid amplification test was validated by [facility] and is  in use under the FDA Emergency Use Authorization (EUA) for clinical labs  CLIA-certified to perform high complexity testing. Test results should be  correlated with clinical presentation, patient history, and epidemiology.        Culture - Blood (03.03.21 @ 22:19)    Specimen Source: .Blood Blood-Peripheral    Culture Results:   No growth at 5 days.      RADIOLOGY & ADDITIONAL STUDIES:       EXAM:  XR CHEST PORTABLE ROUTINE 1V                          PROCEDURE DATE:  03/14/2021          INTERPRETATION:  AP semierect chest on March 14, 2021 at 6:51 PM. Patient has respiratory failure and is positive for Covid.    Heart magnified by technique.    Heart magnified by technique.    Bilateral mild midlung field infiltrates similar to prior.    Question is raised of mediastinal emphysema.    Chest is similar to March 13.    IMPRESSION: Unchanged chest showing mild bilateral infiltrates and suggesting mediastinal emphysema.            HILDA CHARLES MD; Attending Radiologist  This document has been electronically signed. Mar 15 2021  3:38PM

## 2021-03-16 LAB
ALBUMIN SERPL ELPH-MCNC: 2.8 G/DL — LOW (ref 3.3–5.2)
ALP SERPL-CCNC: 83 U/L — SIGNIFICANT CHANGE UP (ref 40–120)
ALT FLD-CCNC: 29 U/L — SIGNIFICANT CHANGE UP
ANION GAP SERPL CALC-SCNC: 13 MMOL/L — SIGNIFICANT CHANGE UP (ref 5–17)
AST SERPL-CCNC: 26 U/L — SIGNIFICANT CHANGE UP
BILIRUB SERPL-MCNC: 0.3 MG/DL — LOW (ref 0.4–2)
BUN SERPL-MCNC: 18 MG/DL — SIGNIFICANT CHANGE UP (ref 8–20)
CALCIUM SERPL-MCNC: 8.6 MG/DL — SIGNIFICANT CHANGE UP (ref 8.6–10.2)
CHLORIDE SERPL-SCNC: 92 MMOL/L — LOW (ref 98–107)
CO2 SERPL-SCNC: 28 MMOL/L — SIGNIFICANT CHANGE UP (ref 22–29)
CREAT SERPL-MCNC: 0.66 MG/DL — SIGNIFICANT CHANGE UP (ref 0.5–1.3)
GLUCOSE BLDC GLUCOMTR-MCNC: 105 MG/DL — HIGH (ref 70–99)
GLUCOSE BLDC GLUCOMTR-MCNC: 131 MG/DL — HIGH (ref 70–99)
GLUCOSE BLDC GLUCOMTR-MCNC: 135 MG/DL — HIGH (ref 70–99)
GLUCOSE BLDC GLUCOMTR-MCNC: 156 MG/DL — HIGH (ref 70–99)
GLUCOSE SERPL-MCNC: 80 MG/DL — SIGNIFICANT CHANGE UP (ref 70–99)
HCT VFR BLD CALC: 44.7 % — SIGNIFICANT CHANGE UP (ref 39–50)
HGB BLD-MCNC: 14.2 G/DL — SIGNIFICANT CHANGE UP (ref 13–17)
MCHC RBC-ENTMCNC: 28.8 PG — SIGNIFICANT CHANGE UP (ref 27–34)
MCHC RBC-ENTMCNC: 31.8 GM/DL — LOW (ref 32–36)
MCV RBC AUTO: 90.7 FL — SIGNIFICANT CHANGE UP (ref 80–100)
PLATELET # BLD AUTO: 460 K/UL — HIGH (ref 150–400)
POTASSIUM SERPL-MCNC: 4.1 MMOL/L — SIGNIFICANT CHANGE UP (ref 3.5–5.3)
POTASSIUM SERPL-SCNC: 4.1 MMOL/L — SIGNIFICANT CHANGE UP (ref 3.5–5.3)
PROCALCITONIN SERPL-MCNC: 0.1 NG/ML — SIGNIFICANT CHANGE UP (ref 0.02–0.1)
PROT SERPL-MCNC: 7 G/DL — SIGNIFICANT CHANGE UP (ref 6.6–8.7)
RBC # BLD: 4.93 M/UL — SIGNIFICANT CHANGE UP (ref 4.2–5.8)
RBC # FLD: 13 % — SIGNIFICANT CHANGE UP (ref 10.3–14.5)
SODIUM SERPL-SCNC: 133 MMOL/L — LOW (ref 135–145)
WBC # BLD: 21.16 K/UL — HIGH (ref 3.8–10.5)
WBC # FLD AUTO: 21.16 K/UL — HIGH (ref 3.8–10.5)

## 2021-03-16 PROCEDURE — 99233 SBSQ HOSP IP/OBS HIGH 50: CPT

## 2021-03-16 PROCEDURE — 99291 CRITICAL CARE FIRST HOUR: CPT

## 2021-03-16 RX ADMIN — Medication 5 MILLIGRAM(S): at 22:01

## 2021-03-16 RX ADMIN — Medication 1 SPRAY(S): at 17:45

## 2021-03-16 RX ADMIN — Medication 106 MILLIGRAM(S): at 05:22

## 2021-03-16 RX ADMIN — TIOTROPIUM BROMIDE 1 CAPSULE(S): 18 CAPSULE ORAL; RESPIRATORY (INHALATION) at 09:27

## 2021-03-16 RX ADMIN — PANTOPRAZOLE SODIUM 40 MILLIGRAM(S): 20 TABLET, DELAYED RELEASE ORAL at 05:22

## 2021-03-16 RX ADMIN — BUDESONIDE AND FORMOTEROL FUMARATE DIHYDRATE 2 PUFF(S): 160; 4.5 AEROSOL RESPIRATORY (INHALATION) at 19:58

## 2021-03-16 RX ADMIN — ENOXAPARIN SODIUM 40 MILLIGRAM(S): 100 INJECTION SUBCUTANEOUS at 17:44

## 2021-03-16 RX ADMIN — BUDESONIDE AND FORMOTEROL FUMARATE DIHYDRATE 2 PUFF(S): 160; 4.5 AEROSOL RESPIRATORY (INHALATION) at 09:23

## 2021-03-16 RX ADMIN — ENOXAPARIN SODIUM 40 MILLIGRAM(S): 100 INJECTION SUBCUTANEOUS at 05:22

## 2021-03-16 RX ADMIN — Medication 1: at 12:12

## 2021-03-16 RX ADMIN — Medication 1 SPRAY(S): at 05:23

## 2021-03-16 NOTE — PHYSICAL THERAPY INITIAL EVALUATION ADULT - ADDITIONAL COMMENTS
Pt reports living with spouse in an apartment. No stairs in/out. Independent at baseline, no device.

## 2021-03-16 NOTE — PROGRESS NOTE ADULT - SUBJECTIVE AND OBJECTIVE BOX
CC: Follow up    INTERVAL HPI/OVERNIGHT EVENTS:N Patient remains on hiflo NC. Sitting up in a chair. With movement Spo2 drops increased from 65% to 80%. Seen with  ipad       Vital Signs Last 24 Hrs  T(C): 36.5 (16 Mar 2021 07:58), Max: 37.1 (15 Mar 2021 16:24)  T(F): 97.7 (16 Mar 2021 07:58), Max: 98.7 (15 Mar 2021 16:24)  HR: 114 (16 Mar 2021 09:27) (68 - 114)  BP: 105/62 (16 Mar 2021 07:58) (105/62 - 122/74)  BP(mean): --  RR: 20 (16 Mar 2021 07:58) (20 - 24)  SpO2: 90% (16 Mar 2021 09:27) (85% - 98%)    PHYSICAL EXAM:    GENERAL: NAD, AOX3  HEAD:  Atraumatic, Normocephalic  EYES:  conjunctiva and sclera clear  ENMT: Moist mucous membranes  CHEST/LUNG: Clear to auscultation bilaterally; No rales, rhonchi, wheezing, or rubs  HEART: Regular rate and rhythm; No murmurs, rubs, or gallops  ABDOMEN: Soft, Nontender, Nondistended; Bowel sounds present  EXTREMITIES:  2+ Peripheral Pulses, No clubbing, cyanosis, or edema        MEDICATIONS  (STANDING):  budesonide 160 MICROgram(s)/formoterol 4.5 MICROgram(s) Inhaler 2 Puff(s) Inhalation two times a day  dexAMETHasone  IVPB 12 milliGRAM(s) IV Intermittent daily  dextrose 40% Gel 15 Gram(s) Oral once  dextrose 5%. 1000 milliLiter(s) (50 mL/Hr) IV Continuous <Continuous>  dextrose 5%. 1000 milliLiter(s) (100 mL/Hr) IV Continuous <Continuous>  dextrose 50% Injectable 25 Gram(s) IV Push once  dextrose 50% Injectable 25 Gram(s) IV Push once  dextrose 50% Injectable 12.5 Gram(s) IV Push once  enoxaparin Injectable 40 milliGRAM(s) SubCutaneous every 12 hours  glucagon  Injectable 1 milliGRAM(s) IntraMuscular once  insulin lispro (ADMELOG) corrective regimen sliding scale   SubCutaneous three times a day before meals  insulin lispro (ADMELOG) corrective regimen sliding scale   SubCutaneous at bedtime  melatonin 5 milliGRAM(s) Oral at bedtime  pantoprazole    Tablet 40 milliGRAM(s) Oral before breakfast  sodium chloride 0.65% Nasal 1 Spray(s) Both Nostrils two times a day  tiotropium 18 MICROgram(s) Capsule 1 Capsule(s) Inhalation daily    MEDICATIONS  (PRN):  acetaminophen   Tablet .. 650 milliGRAM(s) Oral every 6 hours PRN Mild Pain (1 - 3), Moderate Pain (4 - 6)  benzocaine 15 mG/menthol 3.6 mG (Sugar-Free) Lozenge 1 Lozenge Oral every 6 hours PRN Sore Throat  morphine   Solution 1 milliGRAM(s) Oral every 4 hours PRN Severe Pain (7 - 10) OR dyspnea  morphine  - Injectable 1 milliGRAM(s) IV Push every 6 hours PRN SOB  traMADol 50 milliGRAM(s) Oral every 8 hours PRN Moderate Pain (4 - 6)      Allergies    No Known Allergies    Intolerances          LABS:                          14.2   21.16 )-----------( 460      ( 16 Mar 2021 06:16 )             44.7     03-16    133<L>  |  92<L>  |  18.0  ----------------------------<  80  4.1   |  28.0  |  0.66    Ca    8.6      16 Mar 2021 06:16    TPro  7.0  /  Alb  2.8<L>  /  TBili  0.3<L>  /  DBili  x   /  AST  26  /  ALT  29  /  AlkPhos  83  03-16          RADIOLOGY & ADDITIONAL TESTS:

## 2021-03-16 NOTE — PHYSICAL THERAPY INITIAL EVALUATION ADULT - GENERAL OBSERVATIONS, REHAB EVAL
Pt received reclined in bedside chair, (+) high-flow O2, (+) cardiac monitor, (+) IV lock, Pt applied NRB, himself, in anticipation of activity. NAD. Agreeable to PT evaluation.

## 2021-03-16 NOTE — PROGRESS NOTE ADULT - SUBJECTIVE AND OBJECTIVE BOX
PULMONARY PROGRESS NOTE      BECKY GUERRA  MRN-[id number]    Patient is a 55y old  Male who presents with a chief complaint of covid hypoxia (16 Mar 2021 10:23)      INTERVAL HPI/OVERNIGHT EVENTS:    Pt awake and alert  NRB, HFO2 and BiPAP as needed  Comfortable    MEDICATIONS  (STANDING):  budesonide 160 MICROgram(s)/formoterol 4.5 MICROgram(s) Inhaler 2 Puff(s) Inhalation two times a day  dexAMETHasone  IVPB 12 milliGRAM(s) IV Intermittent daily  dextrose 40% Gel 15 Gram(s) Oral once  dextrose 5%. 1000 milliLiter(s) (50 mL/Hr) IV Continuous <Continuous>  dextrose 5%. 1000 milliLiter(s) (100 mL/Hr) IV Continuous <Continuous>  dextrose 50% Injectable 25 Gram(s) IV Push once  dextrose 50% Injectable 12.5 Gram(s) IV Push once  dextrose 50% Injectable 25 Gram(s) IV Push once  enoxaparin Injectable 40 milliGRAM(s) SubCutaneous every 12 hours  glucagon  Injectable 1 milliGRAM(s) IntraMuscular once  insulin lispro (ADMELOG) corrective regimen sliding scale   SubCutaneous three times a day before meals  insulin lispro (ADMELOG) corrective regimen sliding scale   SubCutaneous at bedtime  melatonin 5 milliGRAM(s) Oral at bedtime  pantoprazole    Tablet 40 milliGRAM(s) Oral before breakfast  sodium chloride 0.65% Nasal 1 Spray(s) Both Nostrils two times a day  tiotropium 18 MICROgram(s) Capsule 1 Capsule(s) Inhalation daily      MEDICATIONS  (PRN):  acetaminophen   Tablet .. 650 milliGRAM(s) Oral every 6 hours PRN Mild Pain (1 - 3), Moderate Pain (4 - 6)  benzocaine 15 mG/menthol 3.6 mG (Sugar-Free) Lozenge 1 Lozenge Oral every 6 hours PRN Sore Throat  morphine   Solution 1 milliGRAM(s) Oral every 4 hours PRN Severe Pain (7 - 10) OR dyspnea  morphine  - Injectable 1 milliGRAM(s) IV Push every 6 hours PRN SOB  traMADol 50 milliGRAM(s) Oral every 8 hours PRN Moderate Pain (4 - 6)      Allergies    No Known Allergies    Intolerances        PAST MEDICAL & SURGICAL HISTORY:  Hypertension    No significant past surgical history          REVIEW OF SYSTEMS: Limited due to Covid       RESPIRATORY:  + cough, shortness of breath, no PND or orthopnea.         Vital Signs Last 24 Hrs  T(C): 36.4 (16 Mar 2021 16:09), Max: 36.9 (15 Mar 2021 20:31)  T(F): 97.5 (16 Mar 2021 16:09), Max: 98.5 (16 Mar 2021 04:23)  HR: 93 (16 Mar 2021 16:09) (75 - 114)  BP: 106/73 (16 Mar 2021 16:09) (105/62 - 121/73)  BP(mean): --  RR: 20 (16 Mar 2021 16:09) (20 - 24)  SpO2: 96% (16 Mar 2021 16:09) (88% - 98%)    PHYSICAL EXAMINATION:    GENERAL: The patient is awake and alert in no apparent distress.     HEENT: Head is normocephalic and atraumatic. Extraocular muscles are intact. Mucous membranes are moist.    NECK: Supple.    LUNGS: Clear to auscultation without wheezing, rales or rhonchi; respirations unlabored    HEART: Regular rate and rhythm without murmur.    ABDOMEN: Soft, nontender, and nondistended.      EXTREMITIES: Without any cyanosis, clubbing, rash, lesions or edema.    NEUROLOGIC: Grossly intact.    LABS:                        14.2   21.16 )-----------( 460      ( 16 Mar 2021 06:16 )             44.7     03-16    133<L>  |  92<L>  |  18.0  ----------------------------<  80  4.1   |  28.0  |  0.66    Ca    8.6      16 Mar 2021 06:16    TPro  7.0  /  Alb  2.8<L>  /  TBili  0.3<L>  /  DBili  x   /  AST  26  /  ALT  29  /  AlkPhos  83  03-16        Procalcitonin, Serum: 0.10 ng/mL (03-16-21 @ 06:16)  Procalcitonin, Serum: 0.10 ng/mL (03-15-21 @ 06:36)    MICROBIOLOGY:    COVID-19 PCR . (02.27.21 @ 22:15)    COVID-19 PCR: Detected: TYPE:(C=Critical, N=Notification, A=Abnormal) C  TESTS: COVID-19 PCR  DATE/TIME CALLED: 02/28/2021 00:20:59 EST  CALLED TO: RN: BRITTON  READ BACK (2 Patient Identifiers)(Y/N): Y  READ BACK VALUES (Y/N): Y  CALLED BY: AB  You can help in the fight against COVID-19. [facility] may contact  you to see if you are interested in voluntarily participating in one of  our clinical trials.  Testing is performed using polymerase chain reaction (PCR) or  transcription mediated amplification (TMA). This COVID-19 (SARS-CoV-2)  nucleic acid amplification test was validated by [employer] [facility] and is  in use under the FDA Emergency Use Authorization (EUA) for clinical labs  CLIA-certified to perform high complexity testing. Test results should be  correlated with clinical presentation, patient history, and epidemiology.        RADIOLOGY & ADDITIONAL STUDIES:     EXAM:  XR CHEST PORTABLE ROUTINE 1V                          PROCEDURE DATE:  03/14/2021          INTERPRETATION:  AP semierect chest on March 14, 2021 at 6:51 PM. Patient has respiratory failure and is positive for Covid.    Heart magnified by technique.    Heart magnified by technique.    Bilateral mild midlung field infiltrates similar to prior.    Question is raised of mediastinal emphysema.    Chest is similar to March 13.    IMPRESSION: Unchanged chest showing mild bilateral infiltrates and suggesting mediastinal emphysema.            HILDA CHARLES MD; Attending Radiologist  This document has been electronically signed. Mar 15 2021  3:38PM

## 2021-03-16 NOTE — PROGRESS NOTE ADULT - ASSESSMENT
Acute hypoxic respiratory failure  Covid- 19 pneumonia  Post remdesivir, remains on decadron  Not given Actemra in past, now not candidate    Rec:    Remains on Bipap/high flow/NRB - adjust pressures as tolerates  Bipap 12/6, nocturnal  Continue self proning, wean fio2 as able  Continue steroids for now  Follow inflammatory markers  Follow imaging studies for improvement   On lovenox 40 bid, previous DVT/PE excluded  Critical  Remains on high level of support  DNI

## 2021-03-17 LAB
GLUCOSE BLDC GLUCOMTR-MCNC: 110 MG/DL — HIGH (ref 70–99)
GLUCOSE BLDC GLUCOMTR-MCNC: 117 MG/DL — HIGH (ref 70–99)
GLUCOSE BLDC GLUCOMTR-MCNC: 171 MG/DL — HIGH (ref 70–99)
GLUCOSE BLDC GLUCOMTR-MCNC: 93 MG/DL — SIGNIFICANT CHANGE UP (ref 70–99)

## 2021-03-17 PROCEDURE — 71045 X-RAY EXAM CHEST 1 VIEW: CPT | Mod: 26

## 2021-03-17 PROCEDURE — 99233 SBSQ HOSP IP/OBS HIGH 50: CPT

## 2021-03-17 PROCEDURE — 99291 CRITICAL CARE FIRST HOUR: CPT

## 2021-03-17 RX ADMIN — ENOXAPARIN SODIUM 40 MILLIGRAM(S): 100 INJECTION SUBCUTANEOUS at 18:20

## 2021-03-17 RX ADMIN — BUDESONIDE AND FORMOTEROL FUMARATE DIHYDRATE 2 PUFF(S): 160; 4.5 AEROSOL RESPIRATORY (INHALATION) at 19:59

## 2021-03-17 RX ADMIN — BENZOCAINE AND MENTHOL 1 LOZENGE: 5; 1 LIQUID ORAL at 21:00

## 2021-03-17 RX ADMIN — Medication 106 MILLIGRAM(S): at 06:19

## 2021-03-17 RX ADMIN — BUDESONIDE AND FORMOTEROL FUMARATE DIHYDRATE 2 PUFF(S): 160; 4.5 AEROSOL RESPIRATORY (INHALATION) at 08:40

## 2021-03-17 RX ADMIN — TRAMADOL HYDROCHLORIDE 50 MILLIGRAM(S): 50 TABLET ORAL at 22:22

## 2021-03-17 RX ADMIN — Medication 1: at 13:58

## 2021-03-17 RX ADMIN — TIOTROPIUM BROMIDE 1 CAPSULE(S): 18 CAPSULE ORAL; RESPIRATORY (INHALATION) at 08:40

## 2021-03-17 RX ADMIN — Medication 650 MILLIGRAM(S): at 04:00

## 2021-03-17 RX ADMIN — TRAMADOL HYDROCHLORIDE 50 MILLIGRAM(S): 50 TABLET ORAL at 21:22

## 2021-03-17 RX ADMIN — Medication 1 SPRAY(S): at 18:20

## 2021-03-17 RX ADMIN — Medication 5 MILLIGRAM(S): at 21:22

## 2021-03-17 RX ADMIN — PANTOPRAZOLE SODIUM 40 MILLIGRAM(S): 20 TABLET, DELAYED RELEASE ORAL at 06:19

## 2021-03-17 RX ADMIN — Medication 650 MILLIGRAM(S): at 03:22

## 2021-03-17 RX ADMIN — Medication 1 SPRAY(S): at 06:20

## 2021-03-17 RX ADMIN — ENOXAPARIN SODIUM 40 MILLIGRAM(S): 100 INJECTION SUBCUTANEOUS at 06:19

## 2021-03-17 NOTE — PROGRESS NOTE ADULT - ASSESSMENT
The patient is a 55 year old male with a past medical history of hypertension, hyperlipidemia and diabetes mellitus type 2 admitted for acute hypoxic respiratory failure secondary to COVID. Initially was admitted to ICU on BIpap, was weaned off BIpap to Hiflo NC and transferred to Medicine on 3.1 Completed 10 days of IV remdesivir and decadron however remained hypoxia.  CTA imaging and LE dopplers neg for acute thromboembolic disease. Decadron was restarted at 12mg PO.     Assessment/Plan:    1.  Acute hypoxic respiratory failure secondary to COVID pneumonia On hiflo  DNI not DNR  COmpleted 10 days of IV decadron and remdesivir  Not a candidate for Actemra  On decadron 12mg PO OD day 7  Duplex negative fo dvt  Chest xray reviewed  Nocturnal bipap     2. Hyperkalemia: resolved    3. Hypertension: BP stable    4. History of chronic headaches on tramadol Q8 prn    5.  Prediabetes Hbaic of 6  HSS  Monitor BSl closely    Code status: DNI   NOT DNR  MOLST in the chart  The patient is a 55 year old male with a past medical history of hypertension, hyperlipidemia and diabetes mellitus type 2 admitted for acute hypoxic respiratory failure secondary to COVID. Initially was admitted to ICU on BIpap, was weaned off BIpap to Hiflo NC and transferred to Medicine on 3.1 Completed 10 days of IV remdesivir and decadron however remained hypoxia.  CTA imaging and LE dopplers neg for acute thromboembolic disease. Decadron was restarted at 12mg by pulmonary medicine.      Assessment/Plan:    1.  Acute hypoxic respiratory failure secondary to COVID pneumonia On hiflo  DNI not DNR  Completed 10 days of IV decadron and remdesivir  Not a candidate for Actemra  On decadron 12mg IV OD day 7  Duplex negative fo dvt  Chest xray reviewed  Nocturnal bipap   Repeat inflammatory markers in AM     2. Hyperkalemia: resolved    3. Hypertension: BP stable    4. History of chronic headaches on tramadol Q8 prn    5.  Prediabetes Hbaic of 6  HSS  Monitor BSl closely    VTE: Lovenox 40 mg BID- high risk     Code status: DNI   NOT DNR  MOLST in the chart

## 2021-03-17 NOTE — PROGRESS NOTE ADULT - SUBJECTIVE AND OBJECTIVE BOX
CC: Follow up    INTERVAL HPI/OVERNIGHT EVENTS: Patient seen and examined with  Ipad. NO acute complaints overnight. Remains on Hiflo NC. Sitting up in a chair       Vital Signs Last 24 Hrs  T(C): 36.3 (17 Mar 2021 07:10), Max: 37.1 (16 Mar 2021 12:00)  T(F): 97.4 (17 Mar 2021 07:10), Max: 98.7 (16 Mar 2021 12:00)  HR: 94 (17 Mar 2021 08:40) (75 - 98)  BP: 116/83 (17 Mar 2021 07:10) (106/73 - 120/82)  BP(mean): --  RR: 18 (17 Mar 2021 07:10) (18 - 26)  SpO2: 94% (17 Mar 2021 08:40) (92% - 98%)    PHYSICAL EXAM:    GENERAL: NAD, AOX3  HEAD:  Atraumatic, Normocephalic  EYES: EOMI, PERRLA, conjunctiva and sclera clear  ENMT: Moist mucous membranes  CHEST/LUNG: Clear to auscultation bilaterally; No rales, rhonchi, wheezing, or rubs  HEART: Regular rate and rhythm; No murmurs, rubs, or gallops  ABDOMEN: Soft, Nontender, Nondistended; Bowel sounds present  EXTREMITIES:  2+ Peripheral Pulses, No clubbing, cyanosis, or edema        MEDICATIONS  (STANDING):  budesonide 160 MICROgram(s)/formoterol 4.5 MICROgram(s) Inhaler 2 Puff(s) Inhalation two times a day  dexAMETHasone  IVPB 12 milliGRAM(s) IV Intermittent daily  dextrose 40% Gel 15 Gram(s) Oral once  dextrose 5%. 1000 milliLiter(s) (50 mL/Hr) IV Continuous <Continuous>  dextrose 5%. 1000 milliLiter(s) (100 mL/Hr) IV Continuous <Continuous>  dextrose 50% Injectable 25 Gram(s) IV Push once  dextrose 50% Injectable 12.5 Gram(s) IV Push once  dextrose 50% Injectable 25 Gram(s) IV Push once  enoxaparin Injectable 40 milliGRAM(s) SubCutaneous every 12 hours  glucagon  Injectable 1 milliGRAM(s) IntraMuscular once  insulin lispro (ADMELOG) corrective regimen sliding scale   SubCutaneous three times a day before meals  insulin lispro (ADMELOG) corrective regimen sliding scale   SubCutaneous at bedtime  melatonin 5 milliGRAM(s) Oral at bedtime  pantoprazole    Tablet 40 milliGRAM(s) Oral before breakfast  sodium chloride 0.65% Nasal 1 Spray(s) Both Nostrils two times a day  tiotropium 18 MICROgram(s) Capsule 1 Capsule(s) Inhalation daily    MEDICATIONS  (PRN):  acetaminophen   Tablet .. 650 milliGRAM(s) Oral every 6 hours PRN Mild Pain (1 - 3), Moderate Pain (4 - 6)  benzocaine 15 mG/menthol 3.6 mG (Sugar-Free) Lozenge 1 Lozenge Oral every 6 hours PRN Sore Throat  traMADol 50 milliGRAM(s) Oral every 8 hours PRN Moderate Pain (4 - 6)      Allergies    No Known Allergies    Intolerances          LABS:                          14.2   21.16 )-----------( 460      ( 16 Mar 2021 06:16 )             44.7     03-16    133<L>  |  92<L>  |  18.0  ----------------------------<  80  4.1   |  28.0  |  0.66    Ca    8.6      16 Mar 2021 06:16    TPro  7.0  /  Alb  2.8<L>  /  TBili  0.3<L>  /  DBili  x   /  AST  26  /  ALT  29  /  AlkPhos  83  03-16          RADIOLOGY & ADDITIONAL TESTS:

## 2021-03-17 NOTE — PROGRESS NOTE ADULT - SUBJECTIVE AND OBJECTIVE BOX
PULMONARY PROGRESS NOTE      [id number]    Patient is a 55y old  Male who presents with a chief complaint of covid hypoxia (17 Mar 2021 10:21)      INTERVAL HPI/OVERNIGHT EVENTS:  feels better  no CP or SOB  on high flow 45/65% sp02 96%  MEDICATIONS  (STANDING):  budesonide 160 MICROgram(s)/formoterol 4.5 MICROgram(s) Inhaler 2 Puff(s) Inhalation two times a day  dexAMETHasone  IVPB 12 milliGRAM(s) IV Intermittent daily  dextrose 40% Gel 15 Gram(s) Oral once  dextrose 5%. 1000 milliLiter(s) (50 mL/Hr) IV Continuous <Continuous>  dextrose 5%. 1000 milliLiter(s) (100 mL/Hr) IV Continuous <Continuous>  dextrose 50% Injectable 25 Gram(s) IV Push once  dextrose 50% Injectable 12.5 Gram(s) IV Push once  dextrose 50% Injectable 25 Gram(s) IV Push once  enoxaparin Injectable 40 milliGRAM(s) SubCutaneous every 12 hours  glucagon  Injectable 1 milliGRAM(s) IntraMuscular once  insulin lispro (ADMELOG) corrective regimen sliding scale   SubCutaneous three times a day before meals  insulin lispro (ADMELOG) corrective regimen sliding scale   SubCutaneous at bedtime  melatonin 5 milliGRAM(s) Oral at bedtime  pantoprazole    Tablet 40 milliGRAM(s) Oral before breakfast  sodium chloride 0.65% Nasal 1 Spray(s) Both Nostrils two times a day  tiotropium 18 MICROgram(s) Capsule 1 Capsule(s) Inhalation daily      MEDICATIONS  (PRN):  acetaminophen   Tablet .. 650 milliGRAM(s) Oral every 6 hours PRN Mild Pain (1 - 3), Moderate Pain (4 - 6)  benzocaine 15 mG/menthol 3.6 mG (Sugar-Free) Lozenge 1 Lozenge Oral every 6 hours PRN Sore Throat  traMADol 50 milliGRAM(s) Oral every 8 hours PRN Moderate Pain (4 - 6)      Allergies    No Known Allergies    Intolerances        PAST MEDICAL & SURGICAL HISTORY:  Hypertension    No significant past surgical history        SOCIAL HISTORY  Smoking History:       REVIEW OF SYSTEMS:    CONSTITUTIONAL:  No distress    HEENT:  Eyes:  No diplopia or blurred vision. ENT:  No earache, sore throat or runny nose.    CARDIOVASCULAR:  No pressure, squeezing, tightness, heaviness or aching about the chest; no palpitations.    RESPIRATORY:  see HPI    GASTROINTESTINAL:  No nausea, vomiting or diarrhea.    GENITOURINARY:  No dysuria, frequency or urgency.    NEUROLOGIC:  No paresthesias, fasciculations, seizures or weakness.    PSYCHIATRIC:  No disorder of thought or mood.    Vital Signs Last 24 Hrs  T(C): 36.6 (17 Mar 2021 13:59), Max: 36.8 (17 Mar 2021 04:25)  T(F): 97.8 (17 Mar 2021 13:59), Max: 98.2 (17 Mar 2021 04:25)  HR: 94 (17 Mar 2021 08:40) (75 - 98)  BP: 115/74 (17 Mar 2021 13:59) (106/73 - 120/82)  BP(mean): --  RR: 21 (17 Mar 2021 13:59) (18 - 26)  SpO2: 96% (17 Mar 2021 13:59) (93% - 98%)    PHYSICAL EXAMINATION:    GENERAL: The patient is awake and alert in no apparent distress.       NEUROLOGIC: Grossly intact.    LABS:                        14.2   21.16 )-----------( 460      ( 16 Mar 2021 06:16 )             44.7     03-16    133<L>  |  92<L>  |  18.0  ----------------------------<  80  4.1   |  28.0  |  0.66    Ca    8.6      16 Mar 2021 06:16    TPro  7.0  /  Alb  2.8<L>  /  TBili  0.3<L>  /  DBili  x   /  AST  26  /  ALT  29  /  AlkPhos  83  03-16                    Procalcitonin, Serum: 0.10 ng/mL (03-16-21 @ 06:16)  Procalcitonin, Serum: 0.10 ng/mL (03-15-21 @ 06:36)      MICROBIOLOGY:    RADIOLOGY & ADDITIONAL STUDIES:  CXRs reviewed

## 2021-03-17 NOTE — PROGRESS NOTE ADULT - ASSESSMENT
Acute hypoxic respiratory failure  Covid- 19 pneumonia  Post remdesivir, remains on decadron  Not given Actemra in past, now not candidate  Improved, now down 40L , 60% sp02 94%  CXR c/w pneumomediastinum cxr ordered, will need CT scan    Bipap 12/6, nocturnal  Continue self proning, wean fio2 as able  Continue steroids for now  Follow inflammatory markers  On lovenox 40 bid, previous DVT/PE excluded  Critical  Remains on high level of support  DNI

## 2021-03-18 LAB
ANION GAP SERPL CALC-SCNC: 12 MMOL/L — SIGNIFICANT CHANGE UP (ref 5–17)
BUN SERPL-MCNC: 17 MG/DL — SIGNIFICANT CHANGE UP (ref 8–20)
CALCIUM SERPL-MCNC: 8.8 MG/DL — SIGNIFICANT CHANGE UP (ref 8.6–10.2)
CHLORIDE SERPL-SCNC: 92 MMOL/L — LOW (ref 98–107)
CO2 SERPL-SCNC: 28 MMOL/L — SIGNIFICANT CHANGE UP (ref 22–29)
CREAT SERPL-MCNC: 0.62 MG/DL — SIGNIFICANT CHANGE UP (ref 0.5–1.3)
FERRITIN SERPL-MCNC: 588 NG/ML — HIGH (ref 30–400)
GLUCOSE BLDC GLUCOMTR-MCNC: 124 MG/DL — HIGH (ref 70–99)
GLUCOSE BLDC GLUCOMTR-MCNC: 128 MG/DL — HIGH (ref 70–99)
GLUCOSE BLDC GLUCOMTR-MCNC: 145 MG/DL — HIGH (ref 70–99)
GLUCOSE BLDC GLUCOMTR-MCNC: 92 MG/DL — SIGNIFICANT CHANGE UP (ref 70–99)
GLUCOSE SERPL-MCNC: 93 MG/DL — SIGNIFICANT CHANGE UP (ref 70–99)
HCT VFR BLD CALC: 46.7 % — SIGNIFICANT CHANGE UP (ref 39–50)
HGB BLD-MCNC: 14.8 G/DL — SIGNIFICANT CHANGE UP (ref 13–17)
LDH SERPL L TO P-CCNC: 407 U/L — HIGH (ref 98–192)
MCHC RBC-ENTMCNC: 28.3 PG — SIGNIFICANT CHANGE UP (ref 27–34)
MCHC RBC-ENTMCNC: 31.7 GM/DL — LOW (ref 32–36)
MCV RBC AUTO: 89.3 FL — SIGNIFICANT CHANGE UP (ref 80–100)
PLATELET # BLD AUTO: 355 K/UL — SIGNIFICANT CHANGE UP (ref 150–400)
POTASSIUM SERPL-MCNC: 4.3 MMOL/L — SIGNIFICANT CHANGE UP (ref 3.5–5.3)
POTASSIUM SERPL-SCNC: 4.3 MMOL/L — SIGNIFICANT CHANGE UP (ref 3.5–5.3)
PROCALCITONIN SERPL-MCNC: 0.08 NG/ML — SIGNIFICANT CHANGE UP (ref 0.02–0.1)
RBC # BLD: 5.23 M/UL — SIGNIFICANT CHANGE UP (ref 4.2–5.8)
RBC # FLD: 12.8 % — SIGNIFICANT CHANGE UP (ref 10.3–14.5)
SODIUM SERPL-SCNC: 131 MMOL/L — LOW (ref 135–145)
WBC # BLD: 18.58 K/UL — HIGH (ref 3.8–10.5)
WBC # FLD AUTO: 18.58 K/UL — HIGH (ref 3.8–10.5)

## 2021-03-18 PROCEDURE — 99291 CRITICAL CARE FIRST HOUR: CPT

## 2021-03-18 PROCEDURE — 99233 SBSQ HOSP IP/OBS HIGH 50: CPT

## 2021-03-18 RX ORDER — ZOLPIDEM TARTRATE 10 MG/1
5 TABLET ORAL ONCE
Refills: 0 | Status: DISCONTINUED | OUTPATIENT
Start: 2021-03-18 | End: 2021-03-18

## 2021-03-18 RX ADMIN — ENOXAPARIN SODIUM 40 MILLIGRAM(S): 100 INJECTION SUBCUTANEOUS at 18:09

## 2021-03-18 RX ADMIN — ZOLPIDEM TARTRATE 5 MILLIGRAM(S): 10 TABLET ORAL at 01:46

## 2021-03-18 RX ADMIN — Medication 5 MILLIGRAM(S): at 22:10

## 2021-03-18 RX ADMIN — Medication 1 SPRAY(S): at 18:09

## 2021-03-18 RX ADMIN — TIOTROPIUM BROMIDE 1 CAPSULE(S): 18 CAPSULE ORAL; RESPIRATORY (INHALATION) at 08:00

## 2021-03-18 RX ADMIN — BUDESONIDE AND FORMOTEROL FUMARATE DIHYDRATE 2 PUFF(S): 160; 4.5 AEROSOL RESPIRATORY (INHALATION) at 21:37

## 2021-03-18 RX ADMIN — Medication 650 MILLIGRAM(S): at 01:34

## 2021-03-18 RX ADMIN — BUDESONIDE AND FORMOTEROL FUMARATE DIHYDRATE 2 PUFF(S): 160; 4.5 AEROSOL RESPIRATORY (INHALATION) at 08:00

## 2021-03-18 RX ADMIN — Medication 106 MILLIGRAM(S): at 05:21

## 2021-03-18 RX ADMIN — ENOXAPARIN SODIUM 40 MILLIGRAM(S): 100 INJECTION SUBCUTANEOUS at 05:21

## 2021-03-18 RX ADMIN — Medication 650 MILLIGRAM(S): at 02:34

## 2021-03-18 RX ADMIN — PANTOPRAZOLE SODIUM 40 MILLIGRAM(S): 20 TABLET, DELAYED RELEASE ORAL at 08:47

## 2021-03-18 RX ADMIN — Medication 1 SPRAY(S): at 06:00

## 2021-03-18 NOTE — PROGRESS NOTE ADULT - SUBJECTIVE AND OBJECTIVE BOX
PULMONARY PROGRESS NOTE      [id number]    Patient is a 55y old  Male who presents with a chief complaint of covid hypoxia (18 Mar 2021 15:46)      INTERVAL HPI/OVERNIGHT EVENTS:  no overnight issues  MEDICATIONS  (STANDING):  budesonide 160 MICROgram(s)/formoterol 4.5 MICROgram(s) Inhaler 2 Puff(s) Inhalation two times a day  dexAMETHasone  IVPB 12 milliGRAM(s) IV Intermittent daily  dextrose 40% Gel 15 Gram(s) Oral once  dextrose 5%. 1000 milliLiter(s) (50 mL/Hr) IV Continuous <Continuous>  dextrose 5%. 1000 milliLiter(s) (100 mL/Hr) IV Continuous <Continuous>  dextrose 50% Injectable 25 Gram(s) IV Push once  dextrose 50% Injectable 12.5 Gram(s) IV Push once  dextrose 50% Injectable 25 Gram(s) IV Push once  enoxaparin Injectable 40 milliGRAM(s) SubCutaneous every 12 hours  glucagon  Injectable 1 milliGRAM(s) IntraMuscular once  insulin lispro (ADMELOG) corrective regimen sliding scale   SubCutaneous three times a day before meals  insulin lispro (ADMELOG) corrective regimen sliding scale   SubCutaneous at bedtime  melatonin 5 milliGRAM(s) Oral at bedtime  pantoprazole    Tablet 40 milliGRAM(s) Oral before breakfast  sodium chloride 0.65% Nasal 1 Spray(s) Both Nostrils two times a day  tiotropium 18 MICROgram(s) Capsule 1 Capsule(s) Inhalation daily      MEDICATIONS  (PRN):  acetaminophen   Tablet .. 650 milliGRAM(s) Oral every 6 hours PRN Mild Pain (1 - 3), Moderate Pain (4 - 6)  benzocaine 15 mG/menthol 3.6 mG (Sugar-Free) Lozenge 1 Lozenge Oral every 6 hours PRN Sore Throat      Allergies    No Known Allergies    Intolerances        PAST MEDICAL & SURGICAL HISTORY:  Hypertension    No significant past surgical history        SOCIAL HISTORY  Smoking History:       REVIEW OF SYSTEMS:    CONSTITUTIONAL:  No distress    HEENT:  Eyes:  No diplopia or blurred vision. ENT:  No earache, sore throat or runny nose.    CARDIOVASCULAR:  No pressure, squeezing, tightness, heaviness or aching about the chest; no palpitations.    RESPIRATORY:  see HPI    GASTROINTESTINAL:  No nausea, vomiting or diarrhea.    GENITOURINARY:  No dysuria, frequency or urgency.    NEUROLOGIC:  No paresthesias, fasciculations, seizures or weakness.    PSYCHIATRIC:  No disorder of thought or mood.    Vital Signs Last 24 Hrs  T(C): 36.4 (18 Mar 2021 08:11), Max: 36.9 (17 Mar 2021 20:00)  T(F): 97.5 (18 Mar 2021 08:11), Max: 98.5 (17 Mar 2021 20:00)  HR: 94 (18 Mar 2021 08:11) (73 - 98)  BP: 121/84 (18 Mar 2021 08:11) (114/74 - 121/84)  BP(mean): --  RR: 20 (18 Mar 2021 08:11) (20 - 26)  SpO2: 96% (18 Mar 2021 08:11) (87% - 97%)    PHYSICAL EXAMINATION:    GENERAL: The patient is awake and alert in no apparent distress.       NEUROLOGIC: Grossly intact.    LABS:                        14.8   18.58 )-----------( 355      ( 18 Mar 2021 06:14 )             46.7     03-18    131<L>  |  92<L>  |  17.0  ----------------------------<  93  4.3   |  28.0  |  0.62    Ca    8.8      18 Mar 2021 06:14                      Procalcitonin, Serum: 0.08 ng/mL (03-18-21 @ 06:14)  Procalcitonin, Serum: 0.10 ng/mL (03-16-21 @ 06:16)      MICROBIOLOGY:    RADIOLOGY & ADDITIONAL STUDIES:  CXR reviewed

## 2021-03-18 NOTE — PROGRESS NOTE ADULT - ATTENDING COMMENTS
chart reviewed
greater than 50% of time spent reviewing labs, notes, orders and radiographs, coordinating care

## 2021-03-18 NOTE — PROGRESS NOTE ADULT - SUBJECTIVE AND OBJECTIVE BOX
[facility] Division of Hospital Medicine    Chief Complaint:  Patient is a 55y old  Male who presents with a chief complaint of covid hypoxia (17 Mar 2021 15:43)      SUBJECTIVE / OVERNIGHT EVENTS:  Patient was seen and examined at bedside. Khmer was translated bedside by  - Kaylee. Patient states to be doing well. No current complaints.   Patient denies chest pain, SOB, abd pain, N/V, fever, chills, dysuria or any other complaints. All remainder ROS negative.     MEDICATIONS  (STANDING):  budesonide 160 MICROgram(s)/formoterol 4.5 MICROgram(s) Inhaler 2 Puff(s) Inhalation two times a day  dexAMETHasone  IVPB 12 milliGRAM(s) IV Intermittent daily  dextrose 40% Gel 15 Gram(s) Oral once  dextrose 5%. 1000 milliLiter(s) (50 mL/Hr) IV Continuous <Continuous>  dextrose 5%. 1000 milliLiter(s) (100 mL/Hr) IV Continuous <Continuous>  dextrose 50% Injectable 25 Gram(s) IV Push once  dextrose 50% Injectable 12.5 Gram(s) IV Push once  dextrose 50% Injectable 25 Gram(s) IV Push once  enoxaparin Injectable 40 milliGRAM(s) SubCutaneous every 12 hours  glucagon  Injectable 1 milliGRAM(s) IntraMuscular once  insulin lispro (ADMELOG) corrective regimen sliding scale   SubCutaneous three times a day before meals  insulin lispro (ADMELOG) corrective regimen sliding scale   SubCutaneous at bedtime  melatonin 5 milliGRAM(s) Oral at bedtime  pantoprazole    Tablet 40 milliGRAM(s) Oral before breakfast  sodium chloride 0.65% Nasal 1 Spray(s) Both Nostrils two times a day  tiotropium 18 MICROgram(s) Capsule 1 Capsule(s) Inhalation daily    MEDICATIONS  (PRN):  acetaminophen   Tablet .. 650 milliGRAM(s) Oral every 6 hours PRN Mild Pain (1 - 3), Moderate Pain (4 - 6)  benzocaine 15 mG/menthol 3.6 mG (Sugar-Free) Lozenge 1 Lozenge Oral every 6 hours PRN Sore Throat        I&O's Summary    17 Mar 2021 07:01  -  18 Mar 2021 07:00  --------------------------------------------------------  IN: 150 mL / OUT: 750 mL / NET: -600 mL    18 Mar 2021 07:01  -  18 Mar 2021 15:46  --------------------------------------------------------  IN: 0 mL / OUT: 250 mL / NET: -250 mL        PHYSICAL EXAM:  Vital Signs Last 24 Hrs  T(C): 36.4 (18 Mar 2021 08:11), Max: 36.9 (17 Mar 2021 20:00)  T(F): 97.5 (18 Mar 2021 08:11), Max: 98.5 (17 Mar 2021 20:00)  HR: 94 (18 Mar 2021 08:11) (73 - 98)  BP: 121/84 (18 Mar 2021 08:11) (114/74 - 121/84)  BP(mean): --  RR: 20 (18 Mar 2021 08:11) (20 - 26)  SpO2: 96% (18 Mar 2021 08:11) (87% - 97%)      Constitutional: NAD, Resting  ENT: Supple, No JVD  Lungs: CTA B/L, Non-labored breathing  Cardio: RRR, S1/S2, No murmur  Abdomen: Soft, Nontender, Nondistended; Bowel sounds present  Extremities: No calf tenderness, No pitting edema  Musculoskeletal:   No clubbing or cyanosis of digits; no joint swelling or tenderness to palpation  Psych: A+O to person, place, and time; affect appropriate  Neuro: CN 2-12 are intact and symmetric; no gross sensory deficits;   Skin: No rashes; no palpable lesions    LABS:                        14.8   18.58 )-----------( 355      ( 18 Mar 2021 06:14 )             46.7     03-18    131<L>  |  92<L>  |  17.0  ----------------------------<  93  4.3   |  28.0  |  0.62    Ca    8.8      18 Mar 2021 06:14                CAPILLARY BLOOD GLUCOSE      POCT Blood Glucose.: 145 mg/dL (18 Mar 2021 12:31)  POCT Blood Glucose.: 124 mg/dL (18 Mar 2021 08:47)  POCT Blood Glucose.: 93 mg/dL (17 Mar 2021 21:25)  POCT Blood Glucose.: 117 mg/dL (17 Mar 2021 16:50)        RADIOLOGY REVIEWED

## 2021-03-18 NOTE — PROGRESS NOTE ADULT - ASSESSMENT
The patient is a 55 year old male with a past medical history of hypertension, hyperlipidemia and diabetes mellitus type 2 admitted for acute hypoxic respiratory failure secondary to COVID. Initially was admitted to ICU on BIpap, was weaned off BIpap to Hiflo NC and transferred to Medicine on 3.1 Completed 10 days of IV remdesivir and decadron however remained hypoxia.  CTA imaging and LE dopplers neg for acute thromboembolic disease. Decadron was restarted at 12mg by pulmonary medicine.      Assessment/Plan:    1.  Acute hypoxic respiratory failure secondary to COVID pneumonia On hiflo  DNI not DNR  Completed 10 days of IV decadron and remdesivir  Not a candidate for Actemra  On decadron 12mg IV OD day 7  Duplex negative fo dvt  Chest xray reviewed  Nocturnal bipap     2. Hyperkalemia: resolved    3. Hypertension: BP stable    4. History of chronic headaches on tramadol Q8 prn    5.  Prediabetes Hbaic of 6  HSS  Monitor BSl closely    VTE: Lovenox 40 mg BID- high risk     Code status: DNI   NOT DNR  JOSE in the chart     Patients wife Camila was updated  and answered all questions. [phone] on 3/18/2021.

## 2021-03-18 NOTE — PROGRESS NOTE ADULT - ASSESSMENT
Acute hypoxic respiratory failure  Covid- 19 pneumonia  Post remdesivir, remains on decadron  Not given Actemra in past, now not candidate  Improved, now down 40L , 60% sp02 95%  CXR c/w pneumomediastinum cxr ordered, CXR without ptx  unable to lie flat for CT scan, continue to follow CXR    Bipap 12/6, nocturnal  Continue self proning, wean fio2 as able  Continue Decadrpn, Follow inflammatory markers  On lovenox 40 bid, previous DVT/PE excluded  Critical  Remains on high level of support  DNI

## 2021-03-19 LAB
ALBUMIN SERPL ELPH-MCNC: 2.8 G/DL — LOW (ref 3.3–5.2)
ALP SERPL-CCNC: 98 U/L — SIGNIFICANT CHANGE UP (ref 40–120)
ALT FLD-CCNC: 35 U/L — SIGNIFICANT CHANGE UP
ANION GAP SERPL CALC-SCNC: 9 MMOL/L — SIGNIFICANT CHANGE UP (ref 5–17)
AST SERPL-CCNC: 22 U/L — SIGNIFICANT CHANGE UP
BASOPHILS # BLD AUTO: 0 K/UL — SIGNIFICANT CHANGE UP (ref 0–0.2)
BASOPHILS NFR BLD AUTO: 0 % — SIGNIFICANT CHANGE UP (ref 0–2)
BILIRUB SERPL-MCNC: 0.3 MG/DL — LOW (ref 0.4–2)
BUN SERPL-MCNC: 14 MG/DL — SIGNIFICANT CHANGE UP (ref 8–20)
CALCIUM SERPL-MCNC: 8.7 MG/DL — SIGNIFICANT CHANGE UP (ref 8.6–10.2)
CHLORIDE SERPL-SCNC: 93 MMOL/L — LOW (ref 98–107)
CO2 SERPL-SCNC: 32 MMOL/L — HIGH (ref 22–29)
CREAT SERPL-MCNC: 0.72 MG/DL — SIGNIFICANT CHANGE UP (ref 0.5–1.3)
D DIMER BLD IA.RAPID-MCNC: 337 NG/ML DDU — HIGH
EOSINOPHIL # BLD AUTO: 0.42 K/UL — SIGNIFICANT CHANGE UP (ref 0–0.5)
EOSINOPHIL NFR BLD AUTO: 1.8 % — SIGNIFICANT CHANGE UP (ref 0–6)
GLUCOSE BLDC GLUCOMTR-MCNC: 109 MG/DL — HIGH (ref 70–99)
GLUCOSE BLDC GLUCOMTR-MCNC: 111 MG/DL — HIGH (ref 70–99)
GLUCOSE BLDC GLUCOMTR-MCNC: 168 MG/DL — HIGH (ref 70–99)
GLUCOSE BLDC GLUCOMTR-MCNC: 96 MG/DL — SIGNIFICANT CHANGE UP (ref 70–99)
GLUCOSE SERPL-MCNC: 105 MG/DL — HIGH (ref 70–99)
HCT VFR BLD CALC: 43 % — SIGNIFICANT CHANGE UP (ref 39–50)
HGB BLD-MCNC: 14.1 G/DL — SIGNIFICANT CHANGE UP (ref 13–17)
LYMPHOCYTES # BLD AUTO: 0.61 K/UL — LOW (ref 1–3.3)
LYMPHOCYTES # BLD AUTO: 2.6 % — LOW (ref 13–44)
MANUAL SMEAR VERIFICATION: SIGNIFICANT CHANGE UP
MCHC RBC-ENTMCNC: 29.7 PG — SIGNIFICANT CHANGE UP (ref 27–34)
MCHC RBC-ENTMCNC: 32.8 GM/DL — SIGNIFICANT CHANGE UP (ref 32–36)
MCV RBC AUTO: 90.5 FL — SIGNIFICANT CHANGE UP (ref 80–100)
METAMYELOCYTES # FLD: 0.9 % — HIGH (ref 0–0)
MONOCYTES # BLD AUTO: 0.61 K/UL — SIGNIFICANT CHANGE UP (ref 0–0.9)
MONOCYTES NFR BLD AUTO: 2.6 % — SIGNIFICANT CHANGE UP (ref 2–14)
NEUTROPHILS # BLD AUTO: 21.06 K/UL — HIGH (ref 1.8–7.4)
NEUTROPHILS NFR BLD AUTO: 90.4 % — HIGH (ref 43–77)
PLAT MORPH BLD: ABNORMAL
PLATELET # BLD AUTO: 388 K/UL — SIGNIFICANT CHANGE UP (ref 150–400)
PLATELET CLUMP BLD QL SMEAR: SLIGHT
POTASSIUM SERPL-MCNC: 5.1 MMOL/L — SIGNIFICANT CHANGE UP (ref 3.5–5.3)
POTASSIUM SERPL-SCNC: 5.1 MMOL/L — SIGNIFICANT CHANGE UP (ref 3.5–5.3)
PROT SERPL-MCNC: 7.1 G/DL — SIGNIFICANT CHANGE UP (ref 6.6–8.7)
RBC # BLD: 4.75 M/UL — SIGNIFICANT CHANGE UP (ref 4.2–5.8)
RBC # FLD: 12.9 % — SIGNIFICANT CHANGE UP (ref 10.3–14.5)
RBC BLD AUTO: NORMAL — SIGNIFICANT CHANGE UP
SODIUM SERPL-SCNC: 134 MMOL/L — LOW (ref 135–145)
VARIANT LYMPHS # BLD: 1.7 % — SIGNIFICANT CHANGE UP (ref 0–6)
WBC # BLD: 23.3 K/UL — HIGH (ref 3.8–10.5)
WBC # FLD AUTO: 23.3 K/UL — HIGH (ref 3.8–10.5)

## 2021-03-19 PROCEDURE — 99291 CRITICAL CARE FIRST HOUR: CPT

## 2021-03-19 PROCEDURE — 99233 SBSQ HOSP IP/OBS HIGH 50: CPT

## 2021-03-19 PROCEDURE — 71045 X-RAY EXAM CHEST 1 VIEW: CPT | Mod: 26

## 2021-03-19 RX ORDER — ALPRAZOLAM 0.25 MG
0.5 TABLET ORAL ONCE
Refills: 0 | Status: DISCONTINUED | OUTPATIENT
Start: 2021-03-19 | End: 2021-03-19

## 2021-03-19 RX ORDER — FLUTICASONE PROPIONATE 50 MCG
1 SPRAY, SUSPENSION NASAL
Refills: 0 | Status: DISCONTINUED | OUTPATIENT
Start: 2021-03-19 | End: 2021-03-28

## 2021-03-19 RX ADMIN — BUDESONIDE AND FORMOTEROL FUMARATE DIHYDRATE 2 PUFF(S): 160; 4.5 AEROSOL RESPIRATORY (INHALATION) at 20:21

## 2021-03-19 RX ADMIN — Medication 1: at 12:10

## 2021-03-19 RX ADMIN — TIOTROPIUM BROMIDE 1 CAPSULE(S): 18 CAPSULE ORAL; RESPIRATORY (INHALATION) at 07:28

## 2021-03-19 RX ADMIN — Medication 106 MILLIGRAM(S): at 05:39

## 2021-03-19 RX ADMIN — PANTOPRAZOLE SODIUM 40 MILLIGRAM(S): 20 TABLET, DELAYED RELEASE ORAL at 07:25

## 2021-03-19 RX ADMIN — Medication 0.5 MILLIGRAM(S): at 23:50

## 2021-03-19 RX ADMIN — ENOXAPARIN SODIUM 40 MILLIGRAM(S): 100 INJECTION SUBCUTANEOUS at 17:03

## 2021-03-19 RX ADMIN — BENZOCAINE AND MENTHOL 1 LOZENGE: 5; 1 LIQUID ORAL at 13:25

## 2021-03-19 RX ADMIN — ENOXAPARIN SODIUM 40 MILLIGRAM(S): 100 INJECTION SUBCUTANEOUS at 05:39

## 2021-03-19 RX ADMIN — Medication 650 MILLIGRAM(S): at 23:09

## 2021-03-19 RX ADMIN — BENZOCAINE AND MENTHOL 1 LOZENGE: 5; 1 LIQUID ORAL at 07:25

## 2021-03-19 RX ADMIN — Medication 1 SPRAY(S): at 17:04

## 2021-03-19 RX ADMIN — Medication 5 MILLIGRAM(S): at 21:36

## 2021-03-19 RX ADMIN — Medication 0.5 MILLIGRAM(S): at 16:27

## 2021-03-19 RX ADMIN — BUDESONIDE AND FORMOTEROL FUMARATE DIHYDRATE 2 PUFF(S): 160; 4.5 AEROSOL RESPIRATORY (INHALATION) at 07:28

## 2021-03-19 NOTE — PROGRESS NOTE ADULT - SUBJECTIVE AND OBJECTIVE BOX
PULMONARY PROGRESS NOTE      BECKY GUERRA  MRN-[id number]    Patient is a 55y old  Male who presents with a chief complaint of covid hypoxia (19 Mar 2021 13:52)      INTERVAL HPI/OVERNIGHT EVENTS:    Pt is awake and alert  Improved FiO2 requirement    MEDICATIONS  (STANDING):  budesonide 160 MICROgram(s)/formoterol 4.5 MICROgram(s) Inhaler 2 Puff(s) Inhalation two times a day  dexAMETHasone  IVPB 12 milliGRAM(s) IV Intermittent daily  dextrose 40% Gel 15 Gram(s) Oral once  dextrose 5%. 1000 milliLiter(s) (50 mL/Hr) IV Continuous <Continuous>  dextrose 5%. 1000 milliLiter(s) (100 mL/Hr) IV Continuous <Continuous>  dextrose 50% Injectable 25 Gram(s) IV Push once  dextrose 50% Injectable 12.5 Gram(s) IV Push once  dextrose 50% Injectable 25 Gram(s) IV Push once  enoxaparin Injectable 40 milliGRAM(s) SubCutaneous every 12 hours  fluticasone propionate 50 MICROgram(s)/spray Nasal Spray 1 Spray(s) Both Nostrils two times a day  glucagon  Injectable 1 milliGRAM(s) IntraMuscular once  insulin lispro (ADMELOG) corrective regimen sliding scale   SubCutaneous three times a day before meals  insulin lispro (ADMELOG) corrective regimen sliding scale   SubCutaneous at bedtime  melatonin 5 milliGRAM(s) Oral at bedtime  pantoprazole    Tablet 40 milliGRAM(s) Oral before breakfast  sodium chloride 0.65% Nasal 1 Spray(s) Both Nostrils two times a day  tiotropium 18 MICROgram(s) Capsule 1 Capsule(s) Inhalation daily      MEDICATIONS  (PRN):  acetaminophen   Tablet .. 650 milliGRAM(s) Oral every 6 hours PRN Mild Pain (1 - 3), Moderate Pain (4 - 6)  benzocaine 15 mG/menthol 3.6 mG (Sugar-Free) Lozenge 1 Lozenge Oral every 6 hours PRN Sore Throat      Allergies    No Known Allergies    Intolerances        PAST MEDICAL & SURGICAL HISTORY:  Hypertension    No significant past surgical history          REVIEW OF SYSTEMS:    CONSTITUTIONAL:  No distress    HEENT:  Eyes:  No diplopia or blurred vision. ENT:  No earache, sore throat or runny nose.    CARDIOVASCULAR:  No pressure, squeezing, tightness, heaviness or aching about the chest; no palpitations.    RESPIRATORY:  Improved cough, shortness of breath, no PND or orthopnea. Mild SOBOE    GASTROINTESTINAL:  No nausea, vomiting or diarrhea.    GENITOURINARY:  No dysuria, frequency or urgency.    NEUROLOGIC:  No paresthesias, fasciculations, seizures or weakness.    PSYCHIATRIC:  No disorder of thought or mood.    Vital Signs Last 24 Hrs  T(C): 36.9 (19 Mar 2021 12:15), Max: 36.9 (19 Mar 2021 00:23)  T(F): 98.4 (19 Mar 2021 12:15), Max: 98.4 (19 Mar 2021 00:23)  HR: 77 (19 Mar 2021 15:57) (77 - 93)  BP: 112/72 (19 Mar 2021 12:15) (105/71 - 120/74)  BP(mean): 86 (19 Mar 2021 04:00) (82 - 86)  RR: 22 (19 Mar 2021 12:15) (20 - 23)  SpO2: 92% (19 Mar 2021 15:57) (92% - 100%)    PHYSICAL EXAMINATION:    GENERAL: The patient is awake and alert in no apparent distress.     HEENT: Head is normocephalic and atraumatic. Extraocular muscles are intact. Mucous membranes are moist.    NECK: Supple.    LUNGS: Clear to auscultation without wheezing, rales or rhonchi; respirations unlabored; mild crepitus    HEART: Regular rate and rhythm without murmur.    ABDOMEN: Soft, nontender, and nondistended.      EXTREMITIES: Without any cyanosis, clubbing, rash, lesions or edema.    NEUROLOGIC: Grossly intact.    LABS:                        14.1   23.30 )-----------( 388      ( 19 Mar 2021 08:14 )             43.0     03-19    134<L>  |  93<L>  |  14.0  ----------------------------<  105<H>  5.1   |  32.0<H>  |  0.72    Ca    8.7      19 Mar 2021 08:14    TPro  7.1  /  Alb  2.8<L>  /  TBili  0.3<L>  /  DBili  x   /  AST  22  /  ALT  35  /  AlkPhos  98  03-19        D-Dimer Assay, Quantitative: 337 ng/mL DDU (03-19-21 @ 08:14)        Procalcitonin, Serum: 0.08 ng/mL (03-18-21 @ 06:14)      MICROBIOLOGY:    COVID-19 PCR . (02.27.21 @ 22:15)    COVID-19 PCR: Detected: TYPE:(C=Critical, N=Notification, A=Abnormal) C  TESTS: COVID-19 PCR  DATE/TIME CALLED: 02/28/2021 00:20:59 EST  CALLED TO: RN: BRITTON  READ BACK (2 Patient Identifiers)(Y/N): Y  READ BACK VALUES (Y/N): Y  CALLED BY: AB  You can help in the fight against COVID-19. [facility] may contact  you to see if you are interested in voluntarily participating in one of  our clinical trials.  Testing is performed using polymerase chain reaction (PCR) or  transcription mediated amplification (TMA). This COVID-19 (SARS-CoV-2)  nucleic acid amplification test was validated by [facility] and is  in use under the FDA Emergency Use Authorization (EUA) for clinical labs  CLIA-certified to perform high complexity testing. Test results should be  correlated with clinical presentation, patient history, and epidemiology.        RADIOLOGY & ADDITIONAL STUDIES:     EXAM:  XR CHEST PORTABLE ROUTINE 1V                          PROCEDURE DATE:  03/19/2021          INTERPRETATION:  HISTORY: Covid 19 follow-up    TECHNIQUE: Single frontal view of the chest with comparison to 3/17/2021    FINDINGS:    The heart ismildly enlarged. Diffuse bilateral airspace disease. Mild elevation of the right hemidiaphragm. The apices are unremarkable. Degenerative changes.        IMPRESSION:    Diffuse bilateral airspace disease.            SKYLER PATE MD; Attending Radiologist  This document has been electronically signed. Mar 19 2021  2:34PM

## 2021-03-19 NOTE — PROGRESS NOTE ADULT - SUBJECTIVE AND OBJECTIVE BOX
[facility] Division of Hospital Medicine    Chief Complaint:  Patient is a 55y old  Male who presents with a chief complaint of covid hypoxia (18 Mar 2021 16:29)      SUBJECTIVE / OVERNIGHT EVENTS:  Patient was seen and examined at bedside. Romansh translated by [id number] Esther Rashid. Patient offers no complaints.   Patient denies chest pain, SOB, abd pain, N/V, fever, chills, dysuria or any other complaints. All remainder ROS negative.     MEDICATIONS  (STANDING):  budesonide 160 MICROgram(s)/formoterol 4.5 MICROgram(s) Inhaler 2 Puff(s) Inhalation two times a day  dexAMETHasone  IVPB 12 milliGRAM(s) IV Intermittent daily  dextrose 40% Gel 15 Gram(s) Oral once  dextrose 5%. 1000 milliLiter(s) (50 mL/Hr) IV Continuous <Continuous>  dextrose 5%. 1000 milliLiter(s) (100 mL/Hr) IV Continuous <Continuous>  dextrose 50% Injectable 25 Gram(s) IV Push once  dextrose 50% Injectable 25 Gram(s) IV Push once  dextrose 50% Injectable 12.5 Gram(s) IV Push once  enoxaparin Injectable 40 milliGRAM(s) SubCutaneous every 12 hours  fluticasone propionate 50 MICROgram(s)/spray Nasal Spray 1 Spray(s) Both Nostrils two times a day  glucagon  Injectable 1 milliGRAM(s) IntraMuscular once  insulin lispro (ADMELOG) corrective regimen sliding scale   SubCutaneous three times a day before meals  insulin lispro (ADMELOG) corrective regimen sliding scale   SubCutaneous at bedtime  melatonin 5 milliGRAM(s) Oral at bedtime  pantoprazole    Tablet 40 milliGRAM(s) Oral before breakfast  sodium chloride 0.65% Nasal 1 Spray(s) Both Nostrils two times a day  tiotropium 18 MICROgram(s) Capsule 1 Capsule(s) Inhalation daily    MEDICATIONS  (PRN):  acetaminophen   Tablet .. 650 milliGRAM(s) Oral every 6 hours PRN Mild Pain (1 - 3), Moderate Pain (4 - 6)  benzocaine 15 mG/menthol 3.6 mG (Sugar-Free) Lozenge 1 Lozenge Oral every 6 hours PRN Sore Throat        I&O's Summary    18 Mar 2021 07:01  -  19 Mar 2021 07:00  --------------------------------------------------------  IN: 0 mL / OUT: 1325 mL / NET: -1325 mL        PHYSICAL EXAM:  Vital Signs Last 24 Hrs  T(C): 36.8 (19 Mar 2021 07:38), Max: 36.9 (19 Mar 2021 00:23)  T(F): 98.3 (19 Mar 2021 07:38), Max: 98.4 (19 Mar 2021 00:23)  HR: 83 (19 Mar 2021 08:39) (80 - 93)  BP: 107/68 (19 Mar 2021 07:38) (105/71 - 120/74)  BP(mean): 86 (19 Mar 2021 04:00) (82 - 86)  RR: 20 (19 Mar 2021 07:38) (20 - 23)  SpO2: 99% (19 Mar 2021 08:39) (96% - 100%)      Constitutional: NAD, Resting on HFNC and Venti  ENT: Supple, No JVD  Lungs: CTA B/L, Non-labored breathing  Cardio: RRR, S1/S2, No murmur  Abdomen: Soft, Nontender, Nondistended; Bowel sounds present  Extremities: No calf tenderness, No pitting edema  Musculoskeletal:   No clubbing or cyanosis of digits; no joint swelling or tenderness to palpation  Psych: A+O to person, place, and time; affect appropriate  Neuro: CN 2-12 are intact and symmetric; no gross sensory deficits;   Skin: No rashes; no palpable lesions    LABS:                        14.1   23.30 )-----------( 388      ( 19 Mar 2021 08:14 )             43.0     03-19    134<L>  |  93<L>  |  14.0  ----------------------------<  105<H>  5.1   |  32.0<H>  |  0.72    Ca    8.7      19 Mar 2021 08:14    TPro  7.1  /  Alb  2.8<L>  /  TBili  0.3<L>  /  DBili  x   /  AST  22  /  ALT  35  /  AlkPhos  98  03-19              CAPILLARY BLOOD GLUCOSE      POCT Blood Glucose.: 168 mg/dL (19 Mar 2021 11:52)  POCT Blood Glucose.: 111 mg/dL (19 Mar 2021 07:23)  POCT Blood Glucose.: 92 mg/dL (18 Mar 2021 22:09)  POCT Blood Glucose.: 128 mg/dL (18 Mar 2021 18:08)        RADIOLOGY REVIEWED

## 2021-03-19 NOTE — PROGRESS NOTE ADULT - ASSESSMENT
Acute hypoxic respiratory failure  Covid- 19 pneumonia  Post remdesivir, remains on decadron  Not given Actemra in past, now not candidate  Improved though still requiring HFO2  CXR c/w possible pneumomediastinum; CXR without ptx    Rec:    Continue HFO2 with nocturnal BiPAP  Unable to lie flat for CT scan, continue to follow CXR intermittently depending on clinical picture  Continue self proning  Wean fio2 as able  Continue Decadron, Follow inflammatory markers  On lovenox 40 bid, previous DVT/PE excluded  Critical  Remains on high level of support  DNI  Anxiolytics would be beneficial for his dyspnea    d/w wife at bedside

## 2021-03-19 NOTE — PROGRESS NOTE ADULT - ASSESSMENT
55 year old male with a past medical history of hypertension, hyperlipidemia and diabetes mellitus type 2 admitted for acute hypoxic respiratory failure secondary to COVID. Initially was admitted to ICU on BIpap, was weaned off BIpap to Hiflo NC and transferred to Medicine on 3.1 Completed 10 days of IV remdesivir and decadron however remained hypoxia.  CTA imaging and LE dopplers neg for acute thromboembolic disease. Decadron was restarted at 12mg by pulmonary medicine.      Assessment/Plan:    1.  Acute hypoxic respiratory failure secondary to COVID pneumonia On hiflo  DNI not DNR  Completed 10 days of IV decadron and remdesivir  Not a candidate for Actemra  On decadron 12mg IV OD daily as per pulmonary   Duplex negative fo dvt  Chest xray reviewed  Nocturnal bipap     2. Hyperkalemia: resolved    3. Hypertension: BP stable    4. History of chronic headaches on tramadol Q8 prn    5.  Prediabetes Hba1c of 6  HSS  Monitor BSl closely    VTE: Lovenox 40 mg BID- high risk     Code status: DNI   NOT DNR  MOL in the chart     Patients wife Camila was updated and answered all questions. [phone].    Dispo: Remains acute, Pending course

## 2021-03-20 LAB
ALBUMIN SERPL ELPH-MCNC: 2.8 G/DL — LOW (ref 3.3–5.2)
ALP SERPL-CCNC: 83 U/L — SIGNIFICANT CHANGE UP (ref 40–120)
ALT FLD-CCNC: 35 U/L — SIGNIFICANT CHANGE UP
ANION GAP SERPL CALC-SCNC: 9 MMOL/L — SIGNIFICANT CHANGE UP (ref 5–17)
AST SERPL-CCNC: 19 U/L — SIGNIFICANT CHANGE UP
BASOPHILS # BLD AUTO: 0.05 K/UL — SIGNIFICANT CHANGE UP (ref 0–0.2)
BASOPHILS NFR BLD AUTO: 0.2 % — SIGNIFICANT CHANGE UP (ref 0–2)
BILIRUB SERPL-MCNC: 0.2 MG/DL — LOW (ref 0.4–2)
BUN SERPL-MCNC: 14 MG/DL — SIGNIFICANT CHANGE UP (ref 8–20)
CALCIUM SERPL-MCNC: 8.8 MG/DL — SIGNIFICANT CHANGE UP (ref 8.6–10.2)
CHLORIDE SERPL-SCNC: 91 MMOL/L — LOW (ref 98–107)
CO2 SERPL-SCNC: 30 MMOL/L — HIGH (ref 22–29)
CREAT SERPL-MCNC: 0.66 MG/DL — SIGNIFICANT CHANGE UP (ref 0.5–1.3)
EOSINOPHIL # BLD AUTO: 0.29 K/UL — SIGNIFICANT CHANGE UP (ref 0–0.5)
EOSINOPHIL NFR BLD AUTO: 1.4 % — SIGNIFICANT CHANGE UP (ref 0–6)
GLUCOSE BLDC GLUCOMTR-MCNC: 117 MG/DL — HIGH (ref 70–99)
GLUCOSE BLDC GLUCOMTR-MCNC: 126 MG/DL — HIGH (ref 70–99)
GLUCOSE BLDC GLUCOMTR-MCNC: 137 MG/DL — HIGH (ref 70–99)
GLUCOSE BLDC GLUCOMTR-MCNC: 150 MG/DL — HIGH (ref 70–99)
GLUCOSE SERPL-MCNC: 102 MG/DL — HIGH (ref 70–99)
HCT VFR BLD CALC: 44 % — SIGNIFICANT CHANGE UP (ref 39–50)
HGB BLD-MCNC: 14.6 G/DL — SIGNIFICANT CHANGE UP (ref 13–17)
IMM GRANULOCYTES NFR BLD AUTO: 1.1 % — SIGNIFICANT CHANGE UP (ref 0–1.5)
LYMPHOCYTES # BLD AUTO: 1.28 K/UL — SIGNIFICANT CHANGE UP (ref 1–3.3)
LYMPHOCYTES # BLD AUTO: 6.4 % — LOW (ref 13–44)
MCHC RBC-ENTMCNC: 30 PG — SIGNIFICANT CHANGE UP (ref 27–34)
MCHC RBC-ENTMCNC: 33.2 GM/DL — SIGNIFICANT CHANGE UP (ref 32–36)
MCV RBC AUTO: 90.5 FL — SIGNIFICANT CHANGE UP (ref 80–100)
MONOCYTES # BLD AUTO: 0.71 K/UL — SIGNIFICANT CHANGE UP (ref 0–0.9)
MONOCYTES NFR BLD AUTO: 3.5 % — SIGNIFICANT CHANGE UP (ref 2–14)
NEUTROPHILS # BLD AUTO: 17.55 K/UL — HIGH (ref 1.8–7.4)
NEUTROPHILS NFR BLD AUTO: 87.4 % — HIGH (ref 43–77)
OSMOLALITY UR: 474 MOSM/KG — SIGNIFICANT CHANGE UP (ref 300–1000)
PLATELET # BLD AUTO: 395 K/UL — SIGNIFICANT CHANGE UP (ref 150–400)
POTASSIUM SERPL-MCNC: 4.3 MMOL/L — SIGNIFICANT CHANGE UP (ref 3.5–5.3)
POTASSIUM SERPL-SCNC: 4.3 MMOL/L — SIGNIFICANT CHANGE UP (ref 3.5–5.3)
PROT SERPL-MCNC: 7 G/DL — SIGNIFICANT CHANGE UP (ref 6.6–8.7)
RBC # BLD: 4.86 M/UL — SIGNIFICANT CHANGE UP (ref 4.2–5.8)
RBC # FLD: 13.1 % — SIGNIFICANT CHANGE UP (ref 10.3–14.5)
SODIUM SERPL-SCNC: 130 MMOL/L — LOW (ref 135–145)
SODIUM UR-SCNC: 77 MMOL/L — SIGNIFICANT CHANGE UP
WBC # BLD: 20.11 K/UL — HIGH (ref 3.8–10.5)
WBC # FLD AUTO: 20.11 K/UL — HIGH (ref 3.8–10.5)

## 2021-03-20 PROCEDURE — 99233 SBSQ HOSP IP/OBS HIGH 50: CPT

## 2021-03-20 RX ADMIN — Medication 1 SPRAY(S): at 18:27

## 2021-03-20 RX ADMIN — BENZOCAINE AND MENTHOL 1 LOZENGE: 5; 1 LIQUID ORAL at 13:28

## 2021-03-20 RX ADMIN — ENOXAPARIN SODIUM 40 MILLIGRAM(S): 100 INJECTION SUBCUTANEOUS at 05:03

## 2021-03-20 RX ADMIN — Medication 106 MILLIGRAM(S): at 05:03

## 2021-03-20 RX ADMIN — Medication 1 SPRAY(S): at 21:23

## 2021-03-20 RX ADMIN — PANTOPRAZOLE SODIUM 40 MILLIGRAM(S): 20 TABLET, DELAYED RELEASE ORAL at 08:23

## 2021-03-20 RX ADMIN — Medication 1 SPRAY(S): at 05:03

## 2021-03-20 RX ADMIN — Medication 650 MILLIGRAM(S): at 14:33

## 2021-03-20 RX ADMIN — ENOXAPARIN SODIUM 40 MILLIGRAM(S): 100 INJECTION SUBCUTANEOUS at 18:26

## 2021-03-20 RX ADMIN — Medication 650 MILLIGRAM(S): at 15:30

## 2021-03-20 RX ADMIN — Medication 5 MILLIGRAM(S): at 21:23

## 2021-03-20 RX ADMIN — BUDESONIDE AND FORMOTEROL FUMARATE DIHYDRATE 2 PUFF(S): 160; 4.5 AEROSOL RESPIRATORY (INHALATION) at 08:24

## 2021-03-20 RX ADMIN — BUDESONIDE AND FORMOTEROL FUMARATE DIHYDRATE 2 PUFF(S): 160; 4.5 AEROSOL RESPIRATORY (INHALATION) at 20:03

## 2021-03-20 RX ADMIN — TIOTROPIUM BROMIDE 1 CAPSULE(S): 18 CAPSULE ORAL; RESPIRATORY (INHALATION) at 08:26

## 2021-03-20 NOTE — PROGRESS NOTE ADULT - ASSESSMENT
Acute hypoxic respiratory failure  Covid- 19 pneumonia  Post remdesivir, remains on decadron  Not given Actemra in past, now not candidate  Improved though still requiring HFO2 - slowly decreasing  CXR c/w possible pneumomediastinum; CXR without ptx    Rec:    Continue HFO2 with nocturnal BiPAP - decrease support as able  Unable to lie flat for CT scan, continue to follow CXR intermittently depending on clinical picture  Continue self proning  Wean fio2 as able  Continue Decadron, Follow inflammatory markers  On lovenox 40 bid, previous DVT/PE excluded  Critical though improved  Remains on high level of support  DNI  Anxiolytics for dyspnea

## 2021-03-20 NOTE — PROGRESS NOTE ADULT - SUBJECTIVE AND OBJECTIVE BOX
CHIEF COMPLAINT/INTERVAL HISTORY:    Patient is a 55y old  Male who presents with a chief complaint of covid hypoxia (20 Mar 2021 15:22)      HPI:  Patient is a 55 year old male with a pmhx of HTN who presents with shortness of breath. Patient explains his symptoms began 2/22 with cough, kervin, myalgias, nausea, and SOB. Then went to urgent care and was found to be covid + on 2/22. Since then symptoms have gotten worse. Then came to Ed for severe shortness of breath. EMS found him with sats of 50s on room air. Then upon arrival he was placed on bipap for increase work of breathing. Sats improved but RR did not.. MICU consult placed. Pt seen and examined at bedside with Ed  and at the moment patient is without medical complaints. He explains that he feels "fine" (27 Feb 2021 23:51)      SUBJECTIVE & OBJECTIVE: Pt seen and examined at bedside.     ICU Vital Signs Last 24 Hrs  T(C): 37.1 (20 Mar 2021 16:12), Max: 37.1 (19 Mar 2021 20:44)  T(F): 98.7 (20 Mar 2021 16:12), Max: 98.8 (19 Mar 2021 20:44)  HR: 82 (20 Mar 2021 16:12) (80 - 98)  BP: 126/81 (20 Mar 2021 16:12) (104/73 - 126/81)  BP(mean): 97 (19 Mar 2021 23:49) (83 - 97)  ABP: --  ABP(mean): --  RR: 18 (20 Mar 2021 16:12) (18 - 25)  SpO2: 93% (20 Mar 2021 16:12) (89% - 98%)        MEDICATIONS  (STANDING):  budesonide 160 MICROgram(s)/formoterol 4.5 MICROgram(s) Inhaler 2 Puff(s) Inhalation two times a day  dexAMETHasone  IVPB 12 milliGRAM(s) IV Intermittent daily  dextrose 40% Gel 15 Gram(s) Oral once  dextrose 5%. 1000 milliLiter(s) (50 mL/Hr) IV Continuous <Continuous>  dextrose 5%. 1000 milliLiter(s) (100 mL/Hr) IV Continuous <Continuous>  dextrose 50% Injectable 25 Gram(s) IV Push once  dextrose 50% Injectable 12.5 Gram(s) IV Push once  dextrose 50% Injectable 25 Gram(s) IV Push once  enoxaparin Injectable 40 milliGRAM(s) SubCutaneous every 12 hours  fluticasone propionate 50 MICROgram(s)/spray Nasal Spray 1 Spray(s) Both Nostrils two times a day  glucagon  Injectable 1 milliGRAM(s) IntraMuscular once  insulin lispro (ADMELOG) corrective regimen sliding scale   SubCutaneous three times a day before meals  insulin lispro (ADMELOG) corrective regimen sliding scale   SubCutaneous at bedtime  melatonin 5 milliGRAM(s) Oral at bedtime  pantoprazole    Tablet 40 milliGRAM(s) Oral before breakfast  sodium chloride 0.65% Nasal 1 Spray(s) Both Nostrils two times a day  tiotropium 18 MICROgram(s) Capsule 1 Capsule(s) Inhalation daily    MEDICATIONS  (PRN):  acetaminophen   Tablet .. 650 milliGRAM(s) Oral every 6 hours PRN Mild Pain (1 - 3), Moderate Pain (4 - 6)  benzocaine 15 mG/menthol 3.6 mG (Sugar-Free) Lozenge 1 Lozenge Oral every 6 hours PRN Sore Throat      LABS:                        14.6   20.11 )-----------( 395      ( 20 Mar 2021 07:52 )             44.0     03-20    130<L>  |  91<L>  |  14.0  ----------------------------<  102<H>  4.3   |  30.0<H>  |  0.66    Ca    8.8      20 Mar 2021 07:52    TPro  7.0  /  Alb  2.8<L>  /  TBili  0.2<L>  /  DBili  x   /  AST  19  /  ALT  35  /  AlkPhos  83  03-20          CAPILLARY BLOOD GLUCOSE      POCT Blood Glucose.: 137 mg/dL (20 Mar 2021 12:38)  POCT Blood Glucose.: 117 mg/dL (20 Mar 2021 08:14)  POCT Blood Glucose.: 96 mg/dL (19 Mar 2021 21:35)      RECENT CULTURES:      RADIOLOGY & ADDITIONAL TESTS:      PHYSICAL EXAM:    GENERAL: NAD, well-groomed, well-developed  HEAD:  Atraumatic, Normocephalic  EYES: EOMI, PERRLA, conjunctiva and sclera clear  ENMT: Moist mucous membranes  NECK: Supple, No JVD  NERVOUS SYSTEM:  Alert & Oriented X3, Motor Strength 5/5 B/L upper and lower extremities; DTRs 2+ intact and symmetric  CHEST/LUNG: Clear to auscultation bilaterally; No rales, rhonchi, wheezing, or rubs  HEART: Regular rate and rhythm; No murmurs, rubs, or gallops  ABDOMEN: Soft, Nontender, Nondistended; Bowel sounds present  EXTREMITIES:  2+ Peripheral Pulses, No clubbing, cyanosis, or edema        DVT/GI ppx  Discussed with pt @ bedside CHIEF COMPLAINT/INTERVAL HISTORY:    Patient is a 55y old  Male who presents with a chief complaint of covid hypoxia (20 Mar 2021 15:22)    SUBJECTIVE & OBJECTIVE: Pt seen and examined at bedside. No overnight events. Patient remains hypoxic on hiflo. Appears anxious; DNI.    ROS: No chest pain, palpitations, light headedness, dizziness, headache, nausea/vomiting, fevers/chills, abdominal pain, dysuria or increased urinary frequency.    ICU Vital Signs Last 24 Hrs  T(C): 37.1 (20 Mar 2021 16:12), Max: 37.1 (19 Mar 2021 20:44)  T(F): 98.7 (20 Mar 2021 16:12), Max: 98.8 (19 Mar 2021 20:44)  HR: 82 (20 Mar 2021 16:12) (80 - 98)  BP: 126/81 (20 Mar 2021 16:12) (104/73 - 126/81)  BP(mean): 97 (19 Mar 2021 23:49) (83 - 97)  RR: 18 (20 Mar 2021 16:12) (18 - 25)  SpO2: 93% (20 Mar 2021 16:12) (89% - 98%)    MEDICATIONS  (STANDING):  budesonide 160 MICROgram(s)/formoterol 4.5 MICROgram(s) Inhaler 2 Puff(s) Inhalation two times a day  dexAMETHasone  IVPB 12 milliGRAM(s) IV Intermittent daily  dextrose 40% Gel 15 Gram(s) Oral once  dextrose 5%. 1000 milliLiter(s) (50 mL/Hr) IV Continuous <Continuous>  dextrose 5%. 1000 milliLiter(s) (100 mL/Hr) IV Continuous <Continuous>  dextrose 50% Injectable 25 Gram(s) IV Push once  dextrose 50% Injectable 12.5 Gram(s) IV Push once  dextrose 50% Injectable 25 Gram(s) IV Push once  enoxaparin Injectable 40 milliGRAM(s) SubCutaneous every 12 hours  fluticasone propionate 50 MICROgram(s)/spray Nasal Spray 1 Spray(s) Both Nostrils two times a day  glucagon  Injectable 1 milliGRAM(s) IntraMuscular once  insulin lispro (ADMELOG) corrective regimen sliding scale   SubCutaneous three times a day before meals  insulin lispro (ADMELOG) corrective regimen sliding scale   SubCutaneous at bedtime  melatonin 5 milliGRAM(s) Oral at bedtime  pantoprazole    Tablet 40 milliGRAM(s) Oral before breakfast  sodium chloride 0.65% Nasal 1 Spray(s) Both Nostrils two times a day  tiotropium 18 MICROgram(s) Capsule 1 Capsule(s) Inhalation daily    MEDICATIONS  (PRN):  acetaminophen   Tablet .. 650 milliGRAM(s) Oral every 6 hours PRN Mild Pain (1 - 3), Moderate Pain (4 - 6)  benzocaine 15 mG/menthol 3.6 mG (Sugar-Free) Lozenge 1 Lozenge Oral every 6 hours PRN Sore Throat      LABS:                        14.6   20.11 )-----------( 395      ( 20 Mar 2021 07:52 )             44.0     03-20    130<L>  |  91<L>  |  14.0  ----------------------------<  102<H>  4.3   |  30.0<H>  |  0.66    Ca    8.8      20 Mar 2021 07:52    TPro  7.0  /  Alb  2.8<L>  /  TBili  0.2<L>  /  DBili  x   /  AST  19  /  ALT  35  /  AlkPhos  83  03-20      CAPILLARY BLOOD GLUCOSE      POCT Blood Glucose.: 137 mg/dL (20 Mar 2021 12:38)  POCT Blood Glucose.: 117 mg/dL (20 Mar 2021 08:14)  POCT Blood Glucose.: 96 mg/dL (19 Mar 2021 21:35)      PHYSICAL EXAM:    GENERAL: middle aged male, sitting in bed, on hiflo, NAD  HEAD:  Atraumatic, Normocephalic  EYES: EOMI, PERRLA, conjunctiva and sclera clear  ENMT: Moist mucous membranes  NECK: Supple  NERVOUS SYSTEM:  Alert & Oriented X3   CHEST/LUNG: coarse breath sounds  HEART: Regular rate and rhythm; +S1/S2  ABDOMEN: Soft, Nontender, Nondistended; Bowel sounds present  EXTREMITIES: no pedal edema

## 2021-03-20 NOTE — PROGRESS NOTE ADULT - ASSESSMENT
55 year old male with a past medical history of hypertension, hyperlipidemia and diabetes mellitus type 2 admitted for acute hypoxic respiratory failure secondary to COVID. Initially was admitted to ICU on BIpap, was weaned off BIpap to Hiflo NC and transferred to Medicine on 3.1 Completed 10 days of IV remdesivir and decadron however remained hypoxia.  CTA imaging and LE dopplers neg for acute thromboembolic disease. Decadron was restarted at 12mg by pulmonary medicine; however patient remains hypoxic on hi-andrew.    1.  Acute hypoxic respiratory failure secondary to COVID pneumonia   -remains hypoxic on hi-andrew  -Completed 10 days of IV decadron and remdesivir  -Not a candidate for Actemra per ID  -Continue decadron 12mg IV daily as per pulmonary   -LE Duplex negative for DVT  -Chest xray reviewed  -Nocturnal bipap  -Incentive spirometry, chest PT and proning  -Continue bronchodilators  -Wean FiO2 as tolerated  -Prognosis guarded; pulmonary on board and recommendations appreciated  -Unable to lie flat for CT chest; repeat CXR on 3/21    2. Hypertension  - BP stable off anti-hypertensives    3. Chronic headaches   -denies headaches at this time  -continue tramadol Q8 prn    4.  Prediabetes Hba1c of 6  Monitor sugars on steroids  HbA1c 6.0  Continue ISS  Add lantus if needed    DVT ppx - Lovenox BID (BMI > 30)    Dispo - Remains hypoxic on hiflo. Prognosis guarded. DNI.

## 2021-03-20 NOTE — PROGRESS NOTE ADULT - SUBJECTIVE AND OBJECTIVE BOX
PULMONARY PROGRESS NOTE      BECKY GUERRA  MRN-[id number]    Patient is a 55y old  Male who presents with a chief complaint of covid hypoxia (19 Mar 2021 17:16)      INTERVAL HPI/OVERNIGHT EVENTS:    Pt is awake and alert  Slowly improving  Decreased FiO2 requirement    MEDICATIONS  (STANDING):  budesonide 160 MICROgram(s)/formoterol 4.5 MICROgram(s) Inhaler 2 Puff(s) Inhalation two times a day  dexAMETHasone  IVPB 12 milliGRAM(s) IV Intermittent daily  dextrose 40% Gel 15 Gram(s) Oral once  dextrose 5%. 1000 milliLiter(s) (50 mL/Hr) IV Continuous <Continuous>  dextrose 5%. 1000 milliLiter(s) (100 mL/Hr) IV Continuous <Continuous>  dextrose 50% Injectable 25 Gram(s) IV Push once  dextrose 50% Injectable 12.5 Gram(s) IV Push once  dextrose 50% Injectable 25 Gram(s) IV Push once  enoxaparin Injectable 40 milliGRAM(s) SubCutaneous every 12 hours  fluticasone propionate 50 MICROgram(s)/spray Nasal Spray 1 Spray(s) Both Nostrils two times a day  glucagon  Injectable 1 milliGRAM(s) IntraMuscular once  insulin lispro (ADMELOG) corrective regimen sliding scale   SubCutaneous three times a day before meals  insulin lispro (ADMELOG) corrective regimen sliding scale   SubCutaneous at bedtime  melatonin 5 milliGRAM(s) Oral at bedtime  pantoprazole    Tablet 40 milliGRAM(s) Oral before breakfast  sodium chloride 0.65% Nasal 1 Spray(s) Both Nostrils two times a day  tiotropium 18 MICROgram(s) Capsule 1 Capsule(s) Inhalation daily      MEDICATIONS  (PRN):  acetaminophen   Tablet .. 650 milliGRAM(s) Oral every 6 hours PRN Mild Pain (1 - 3), Moderate Pain (4 - 6)  benzocaine 15 mG/menthol 3.6 mG (Sugar-Free) Lozenge 1 Lozenge Oral every 6 hours PRN Sore Throat      Allergies    No Known Allergies    Intolerances        PAST MEDICAL & SURGICAL HISTORY:  Hypertension    No significant past surgical history          Vital Signs Last 24 Hrs  T(C): 36.6 (20 Mar 2021 08:08), Max: 37.1 (19 Mar 2021 20:44)  T(F): 97.9 (20 Mar 2021 08:08), Max: 98.8 (19 Mar 2021 20:44)  HR: 81 (20 Mar 2021 08:08) (72 - 98)  BP: 116/77 (20 Mar 2021 08:08) (104/73 - 125/83)  BP(mean): 97 (19 Mar 2021 23:49) (83 - 97)  RR: 18 (20 Mar 2021 08:08) (18 - 25)  SpO2: 98% (20 Mar 2021 08:08) (89% - 98%)    PHYSICAL EXAMINATION:    GENERAL: The patient is awake and alert in no apparent distress on HFO2.    HEENT: Head is normocephalic and atraumatic. Extraocular muscles are intact. Mucous membranes are moist.    NECK: Supple.    LUNGS: Clear to auscultation without wheezing, rales or rhonchi; respirations unlabored    HEART: Regular rate and rhythm without murmur.    ABDOMEN: Soft, nontender, and nondistended.      EXTREMITIES: Without any cyanosis, clubbing, rash, lesions or edema.    NEUROLOGIC: Grossly intact.    LABS:                        14.6   20.11 )-----------( 395      ( 20 Mar 2021 07:52 )             44.0     03-20    130<L>  |  91<L>  |  14.0  ----------------------------<  102<H>  4.3   |  30.0<H>  |  0.66    Ca    8.8      20 Mar 2021 07:52    TPro  7.0  /  Alb  2.8<L>  /  TBili  0.2<L>  /  DBili  x   /  AST  19  /  ALT  35  /  AlkPhos  83  03-20        Procalcitonin, Serum: 0.08 ng/mL (03-18-21 @ 06:14)      MICROBIOLOGY:    COVID-19 PCR . (02.27.21 @ 22:15)    COVID-19 PCR: Detected: TYPE:(C=Critical, N=Notification, A=Abnormal) C  TESTS: COVID-19 PCR  DATE/TIME CALLED: 02/28/2021 00:20:59 EST  CALLED TO: RN: BRITTON  READ BACK (2 Patient Identifiers)(Y/N): Y  READ BACK VALUES (Y/N): Y  CALLED BY: AB  You can help in the fight against COVID-19. [facility] may contact  you to see if you are interested in voluntarily participating in one of  our clinical trials.  Testing is performed using polymerase chain reaction (PCR) or  transcription mediated amplification (TMA). This COVID-19 (SARS-CoV-2)  nucleic acid amplification test was validated by [employer] [facility] and is  in use under the FDA Emergency Use Authorization (EUA) for clinical labs  CLIA-certified to perform high complexity testing. Test results should be  correlated with clinical presentation, patient history, and epidemiology.        RADIOLOGY & ADDITIONAL STUDIES:     EXAM:  XR CHEST PORTABLE ROUTINE 1V                          PROCEDURE DATE:  03/19/2021          INTERPRETATION:  HISTORY: Covid 19 follow-up    TECHNIQUE: Single frontal view of the chest with comparison to 3/17/2021    FINDINGS:    The heart ismildly enlarged. Diffuse bilateral airspace disease. Mild elevation of the right hemidiaphragm. The apices are unremarkable. Degenerative changes.        IMPRESSION:    Diffuse bilateral airspace disease.        SKYLER PATE MD; Attending Radiologist  This document has been electronically signed. Mar 19 2021  2:34PM

## 2021-03-20 NOTE — PROGRESS NOTE ADULT - NSHPATTENDINGPLANDISCUSS_GEN_ALL_CORE
Dr La
Dr Smith
MICU team
Dr La
patient, RN
pt, 3 t, PMD
nurse patient bedside
pt,
pt, 3 t
pt, PMD
pt, pmd

## 2021-03-21 LAB
ANION GAP SERPL CALC-SCNC: 11 MMOL/L — SIGNIFICANT CHANGE UP (ref 5–17)
BASOPHILS # BLD AUTO: 0.05 K/UL — SIGNIFICANT CHANGE UP (ref 0–0.2)
BASOPHILS NFR BLD AUTO: 0.3 % — SIGNIFICANT CHANGE UP (ref 0–2)
BUN SERPL-MCNC: 17 MG/DL — SIGNIFICANT CHANGE UP (ref 8–20)
CALCIUM SERPL-MCNC: 8.7 MG/DL — SIGNIFICANT CHANGE UP (ref 8.6–10.2)
CHLORIDE SERPL-SCNC: 93 MMOL/L — LOW (ref 98–107)
CO2 SERPL-SCNC: 29 MMOL/L — SIGNIFICANT CHANGE UP (ref 22–29)
CREAT SERPL-MCNC: 0.81 MG/DL — SIGNIFICANT CHANGE UP (ref 0.5–1.3)
EOSINOPHIL # BLD AUTO: 0.34 K/UL — SIGNIFICANT CHANGE UP (ref 0–0.5)
EOSINOPHIL NFR BLD AUTO: 1.9 % — SIGNIFICANT CHANGE UP (ref 0–6)
GLUCOSE BLDC GLUCOMTR-MCNC: 122 MG/DL — HIGH (ref 70–99)
GLUCOSE BLDC GLUCOMTR-MCNC: 128 MG/DL — HIGH (ref 70–99)
GLUCOSE BLDC GLUCOMTR-MCNC: 147 MG/DL — HIGH (ref 70–99)
GLUCOSE BLDC GLUCOMTR-MCNC: 92 MG/DL — SIGNIFICANT CHANGE UP (ref 70–99)
GLUCOSE SERPL-MCNC: 89 MG/DL — SIGNIFICANT CHANGE UP (ref 70–99)
HCT VFR BLD CALC: 44.4 % — SIGNIFICANT CHANGE UP (ref 39–50)
HGB BLD-MCNC: 14.4 G/DL — SIGNIFICANT CHANGE UP (ref 13–17)
IMM GRANULOCYTES NFR BLD AUTO: 1 % — SIGNIFICANT CHANGE UP (ref 0–1.5)
LYMPHOCYTES # BLD AUTO: 1.41 K/UL — SIGNIFICANT CHANGE UP (ref 1–3.3)
LYMPHOCYTES # BLD AUTO: 7.7 % — LOW (ref 13–44)
MAGNESIUM SERPL-MCNC: 2 MG/DL — SIGNIFICANT CHANGE UP (ref 1.6–2.6)
MCHC RBC-ENTMCNC: 29.4 PG — SIGNIFICANT CHANGE UP (ref 27–34)
MCHC RBC-ENTMCNC: 32.4 GM/DL — SIGNIFICANT CHANGE UP (ref 32–36)
MCV RBC AUTO: 90.8 FL — SIGNIFICANT CHANGE UP (ref 80–100)
MONOCYTES # BLD AUTO: 0.82 K/UL — SIGNIFICANT CHANGE UP (ref 0–0.9)
MONOCYTES NFR BLD AUTO: 4.5 % — SIGNIFICANT CHANGE UP (ref 2–14)
NEUTROPHILS # BLD AUTO: 15.45 K/UL — HIGH (ref 1.8–7.4)
NEUTROPHILS NFR BLD AUTO: 84.6 % — HIGH (ref 43–77)
OSMOLALITY SERPL: 287 MOSMOL/KG — SIGNIFICANT CHANGE UP (ref 275–300)
PHOSPHATE SERPL-MCNC: 2.9 MG/DL — SIGNIFICANT CHANGE UP (ref 2.4–4.7)
PLATELET # BLD AUTO: 339 K/UL — SIGNIFICANT CHANGE UP (ref 150–400)
POTASSIUM SERPL-MCNC: 4.3 MMOL/L — SIGNIFICANT CHANGE UP (ref 3.5–5.3)
POTASSIUM SERPL-SCNC: 4.3 MMOL/L — SIGNIFICANT CHANGE UP (ref 3.5–5.3)
PROCALCITONIN SERPL-MCNC: 0.11 NG/ML — HIGH (ref 0.02–0.1)
RBC # BLD: 4.89 M/UL — SIGNIFICANT CHANGE UP (ref 4.2–5.8)
RBC # FLD: 13.2 % — SIGNIFICANT CHANGE UP (ref 10.3–14.5)
SODIUM SERPL-SCNC: 133 MMOL/L — LOW (ref 135–145)
TSH SERPL-MCNC: 1.39 UIU/ML — SIGNIFICANT CHANGE UP (ref 0.27–4.2)
URATE SERPL-MCNC: 3.9 MG/DL — SIGNIFICANT CHANGE UP (ref 3.4–7)
WBC # BLD: 18.26 K/UL — HIGH (ref 3.8–10.5)
WBC # FLD AUTO: 18.26 K/UL — HIGH (ref 3.8–10.5)

## 2021-03-21 PROCEDURE — 99233 SBSQ HOSP IP/OBS HIGH 50: CPT

## 2021-03-21 PROCEDURE — 99291 CRITICAL CARE FIRST HOUR: CPT

## 2021-03-21 RX ORDER — ALPRAZOLAM 0.25 MG
0.25 TABLET ORAL
Refills: 0 | Status: DISCONTINUED | OUTPATIENT
Start: 2021-03-21 | End: 2021-03-23

## 2021-03-21 RX ADMIN — ENOXAPARIN SODIUM 40 MILLIGRAM(S): 100 INJECTION SUBCUTANEOUS at 05:30

## 2021-03-21 RX ADMIN — Medication 0.25 MILLIGRAM(S): at 15:12

## 2021-03-21 RX ADMIN — Medication 650 MILLIGRAM(S): at 12:50

## 2021-03-21 RX ADMIN — TIOTROPIUM BROMIDE 1 CAPSULE(S): 18 CAPSULE ORAL; RESPIRATORY (INHALATION) at 08:36

## 2021-03-21 RX ADMIN — ENOXAPARIN SODIUM 40 MILLIGRAM(S): 100 INJECTION SUBCUTANEOUS at 17:41

## 2021-03-21 RX ADMIN — Medication 1 SPRAY(S): at 05:31

## 2021-03-21 RX ADMIN — BUDESONIDE AND FORMOTEROL FUMARATE DIHYDRATE 2 PUFF(S): 160; 4.5 AEROSOL RESPIRATORY (INHALATION) at 19:42

## 2021-03-21 RX ADMIN — BENZOCAINE AND MENTHOL 1 LOZENGE: 5; 1 LIQUID ORAL at 22:01

## 2021-03-21 RX ADMIN — BUDESONIDE AND FORMOTEROL FUMARATE DIHYDRATE 2 PUFF(S): 160; 4.5 AEROSOL RESPIRATORY (INHALATION) at 07:54

## 2021-03-21 RX ADMIN — PANTOPRAZOLE SODIUM 40 MILLIGRAM(S): 20 TABLET, DELAYED RELEASE ORAL at 05:30

## 2021-03-21 RX ADMIN — Medication 650 MILLIGRAM(S): at 11:52

## 2021-03-21 RX ADMIN — Medication 1 SPRAY(S): at 17:41

## 2021-03-21 RX ADMIN — Medication 106 MILLIGRAM(S): at 05:30

## 2021-03-21 RX ADMIN — Medication 5 MILLIGRAM(S): at 21:59

## 2021-03-21 RX ADMIN — BENZOCAINE AND MENTHOL 1 LOZENGE: 5; 1 LIQUID ORAL at 11:53

## 2021-03-21 NOTE — PROGRESS NOTE ADULT - SUBJECTIVE AND OBJECTIVE BOX
PULMONARY PROGRESS NOTE      BECKY GUERRA  MRN-[id number]    Patient is a 55y old  Male who presents with a chief complaint of covid hypoxia (20 Mar 2021 18:11)      INTERVAL HPI/OVERNIGHT EVENTS:    Pt is awake and alert  Sitting in chair  Comfortable  Increased HFO2, now on 50L/70%    MEDICATIONS  (STANDING):  budesonide 160 MICROgram(s)/formoterol 4.5 MICROgram(s) Inhaler 2 Puff(s) Inhalation two times a day  dexAMETHasone  IVPB 12 milliGRAM(s) IV Intermittent daily  dextrose 40% Gel 15 Gram(s) Oral once  dextrose 5%. 1000 milliLiter(s) (50 mL/Hr) IV Continuous <Continuous>  dextrose 5%. 1000 milliLiter(s) (100 mL/Hr) IV Continuous <Continuous>  dextrose 50% Injectable 25 Gram(s) IV Push once  dextrose 50% Injectable 12.5 Gram(s) IV Push once  dextrose 50% Injectable 25 Gram(s) IV Push once  enoxaparin Injectable 40 milliGRAM(s) SubCutaneous every 12 hours  fluticasone propionate 50 MICROgram(s)/spray Nasal Spray 1 Spray(s) Both Nostrils two times a day  glucagon  Injectable 1 milliGRAM(s) IntraMuscular once  insulin lispro (ADMELOG) corrective regimen sliding scale   SubCutaneous three times a day before meals  insulin lispro (ADMELOG) corrective regimen sliding scale   SubCutaneous at bedtime  melatonin 5 milliGRAM(s) Oral at bedtime  pantoprazole    Tablet 40 milliGRAM(s) Oral before breakfast  sodium chloride 0.65% Nasal 1 Spray(s) Both Nostrils two times a day  tiotropium 18 MICROgram(s) Capsule 1 Capsule(s) Inhalation daily      MEDICATIONS  (PRN):  acetaminophen   Tablet .. 650 milliGRAM(s) Oral every 6 hours PRN Mild Pain (1 - 3), Moderate Pain (4 - 6)  benzocaine 15 mG/menthol 3.6 mG (Sugar-Free) Lozenge 1 Lozenge Oral every 6 hours PRN Sore Throat      Allergies    No Known Allergies    Intolerances        PAST MEDICAL & SURGICAL HISTORY:  Hypertension    No significant past surgical history          REVIEW OF SYSTEMS:    CONSTITUTIONAL:  No distress    HEENT:  Eyes:  No diplopia or blurred vision. ENT:  No earache, sore throat or runny nose.    CARDIOVASCULAR:  No pressure, squeezing, tightness, heaviness or aching about the chest; no palpitations.    RESPIRATORY:  Mild cough, shortness of breath, no PND or orthopnea. + SOBOE    GASTROINTESTINAL:  No nausea, vomiting or diarrhea.    GENITOURINARY:  No dysuria, frequency or urgency.    NEUROLOGIC:  No paresthesias, fasciculations, seizures or weakness.    PSYCHIATRIC:  No disorder of thought or mood.    Vital Signs Last 24 Hrs  T(C): 36.7 (21 Mar 2021 08:27), Max: 37.1 (20 Mar 2021 16:12)  T(F): 98.1 (21 Mar 2021 08:27), Max: 98.7 (20 Mar 2021 16:12)  HR: 73 (21 Mar 2021 08:27) (73 - 94)  BP: 140/85 (21 Mar 2021 08:27) (107/76 - 140/85)  BP(mean): 103 (21 Mar 2021 05:27) (86 - 103)  RR: 20 (21 Mar 2021 08:27) (18 - 24)  SpO2: 99% (21 Mar 2021 08:27) (90% - 100%)    PHYSICAL EXAMINATION:    GENERAL: The patient is awake and alert in no apparent distress.     HEENT: Head is normocephalic and atraumatic. Extraocular muscles are intact. Mucous membranes are moist.    NECK: Supple.    LUNGS: Clear to auscultation without wheezing, rales or rhonchi; respirations unlabored    HEART: Regular rate and rhythm without murmur.    ABDOMEN: Soft, nontender, and nondistended.      EXTREMITIES: Without any cyanosis, clubbing, rash, lesions or edema.    NEUROLOGIC: Grossly intact.    LABS:                        14.4   18.26 )-----------( 339      ( 21 Mar 2021 08:36 )             44.4     03-21    133<L>  |  93<L>  |  17.0  ----------------------------<  89  4.3   |  29.0  |  0.81    Ca    8.7      21 Mar 2021 08:36  Phos  2.9     03-21  Mg     2.0     03-21    TPro  7.0  /  Alb  2.8<L>  /  TBili  0.2<L>  /  DBili  x   /  AST  19  /  ALT  35  /  AlkPhos  83  03-20        Procalcitonin, Serum: 0.11 ng/mL (03-21-21 @ 08:36)      MICROBIOLOGY:    COVID-19 PCR . (02.27.21 @ 22:15)    COVID-19 PCR: Detected: TYPE:(C=Critical, N=Notification, A=Abnormal) C  TESTS: COVID-19 PCR  DATE/TIME CALLED: 02/28/2021 00:20:59 EST  CALLED TO: RN: BRITTON  READ BACK (2 Patient Identifiers)(Y/N): Y  READ BACK VALUES (Y/N): Y  CALLED BY: AB  You can help in the fight against COVID-19. [facility] may contact  you to see if you are interested in voluntarily participating in one of  our clinical trials.  Testing is performed using polymerase chain reaction (PCR) or  transcription mediated amplification (TMA). This COVID-19 (SARS-CoV-2)  nucleic acid amplification test was validated by [facility] and is  in use under the FDA Emergency Use Authorization (EUA) for clinical labs  CLIA-certified to perform high complexity testing. Test results should be  correlated with clinical presentation, patient history, and epidemiology.        RADIOLOGY & ADDITIONAL STUDIES:       EXAM:  XR CHEST PORTABLE ROUTINE 1V                          PROCEDURE DATE:  03/19/2021          INTERPRETATION:  HISTORY: Covid 19 follow-up    TECHNIQUE: Single frontal view of the chest with comparison to 3/17/2021    FINDINGS:    The heart ismildly enlarged. Diffuse bilateral airspace disease. Mild elevation of the right hemidiaphragm. The apices are unremarkable. Degenerative changes.        IMPRESSION:    Diffuse bilateral airspace disease.            SKYLER PATE MD; Attending Radiologist  This document has been electronically signed. Mar 19 2021  2:34PM

## 2021-03-21 NOTE — PROGRESS NOTE ADULT - ASSESSMENT
Acute hypoxic respiratory failure  Covid- 19 pneumonia  Post remdesivir, remains on decadron  Not given Actemra in past, now not candidate  Improved though still requiring HFO2 - needed to increase FiO2 since yesterday  CXR c/w possible pneumomediastinum; CXR without ptx    Rec:    Continue HFO2 with nocturnal BiPAP - decrease support as able  Unable to lie flat for CT scan, continue to follow CXR intermittently depending on clinical picture  Consider repeat CXR if FiO2 requirement does not improve to f/u prior pneumomediastinum  Continue self proning  Wean fio2 as able  Continue Decadron, Follow inflammatory markers  On lovenox 40 bid, previous DVT/PE excluded  Critical though improved  Remains on high level of support; critical  DNI  Anxiolytics for dyspnea

## 2021-03-21 NOTE — PROGRESS NOTE ADULT - ASSESSMENT
55 year old male with a past medical history of hypertension, hyperlipidemia and diabetes mellitus type 2 admitted for acute hypoxic respiratory failure secondary to COVID. Initially was admitted to ICU on BIpap, was weaned off BIpap to Hif[facility] [place] and transferred to Medicine on 3.1 Completed 10 days of IV remdesivir and decadron however remained hypoxia.  CTA imaging and LE dopplers neg for acute thromboembolic disease. Decadron was restarted at 12mg by pulmonary medicine; however patient remains hypoxic on hi-andrew.    1.  Acute hypoxic respiratory failure secondary to COVID pneumonia   -remains hypoxic on hi-andrew; Fio2 titrated down to 45% after initiation of anxiolytic  -Completed 10 days of IV decadron and remdesivir  -Not a candidate for Actemra per ID  -Continue decadron 12mg IV daily as per pulmonary   -LE Duplex negative for DVT  -Nocturnal bipap  -Incentive spirometry, chest PT and self proning  -Continue bronchodilators  -Wean FiO2 as tolerated  -Prognosis guarded; pulmonary on board and recommendations appreciated  -Unable to lie flat for CT chest; repeat CXR on 3/22    2. Hypertension  - BP stable off anti-hypertensives    3. Chronic headaches   -denies headaches at this time  -continue tramadol Q8 prn    4.  Prediabetes Hba1c of 6  Monitor sugars on steroids  HbA1c 6.0  Continue ISS  Add lantus if needed    DVT ppx - Lovenox BID (BMI > 30)    Dispo - Remains hypoxic on hiflo; however Fio2 down to 45% this evening. Repeat CXR in AM. Prognosis guarded. DNI.      Attending Attestation:   Plan discussed with patient, RN.

## 2021-03-21 NOTE — PROGRESS NOTE ADULT - SUBJECTIVE AND OBJECTIVE BOX
CHIEF COMPLAINT/INTERVAL HISTORY:    Patient is a 55y old  Male who presents with a chief complaint of covid hypoxia (21 Mar 2021 14:05)    SUBJECTIVE & OBJECTIVE: Pt seen and examined at bedside. No overnight events. OOB to chair; appears anxious. Started on PRN xanax. Fio2 able to be weaned down from 70 --> 40%    ROS: No chest pain, palpitations, light headedness, dizziness, headache, nausea/vomiting, fevers/chills, abdominal pain, dysuria or increased urinary frequency.    ICU Vital Signs Last 24 Hrs  T(C): 36.9 (21 Mar 2021 21:54), Max: 36.9 (21 Mar 2021 12:50)  T(F): 98.5 (21 Mar 2021 21:54), Max: 98.5 (21 Mar 2021 21:54)  HR: 74 (21 Mar 2021 21:54) (72 - 99)  BP: 131/72 (21 Mar 2021 21:54) (97/64 - 140/85)  BP(mean): 103 (21 Mar 2021 05:27) (88 - 103)  RR: 20 (21 Mar 2021 21:54) (18 - 22)  SpO2: 95% (21 Mar 2021 21:54) (88% - 100%)    MEDICATIONS  (STANDING):  budesonide 160 MICROgram(s)/formoterol 4.5 MICROgram(s) Inhaler 2 Puff(s) Inhalation two times a day  dexAMETHasone  IVPB 12 milliGRAM(s) IV Intermittent daily  dextrose 40% Gel 15 Gram(s) Oral once  dextrose 5%. 1000 milliLiter(s) (50 mL/Hr) IV Continuous <Continuous>  dextrose 5%. 1000 milliLiter(s) (100 mL/Hr) IV Continuous <Continuous>  dextrose 50% Injectable 25 Gram(s) IV Push once  dextrose 50% Injectable 12.5 Gram(s) IV Push once  dextrose 50% Injectable 25 Gram(s) IV Push once  enoxaparin Injectable 40 milliGRAM(s) SubCutaneous every 12 hours  fluticasone propionate 50 MICROgram(s)/spray Nasal Spray 1 Spray(s) Both Nostrils two times a day  glucagon  Injectable 1 milliGRAM(s) IntraMuscular once  insulin lispro (ADMELOG) corrective regimen sliding scale   SubCutaneous three times a day before meals  insulin lispro (ADMELOG) corrective regimen sliding scale   SubCutaneous at bedtime  melatonin 5 milliGRAM(s) Oral at bedtime  pantoprazole    Tablet 40 milliGRAM(s) Oral before breakfast  sodium chloride 0.65% Nasal 1 Spray(s) Both Nostrils two times a day  tiotropium 18 MICROgram(s) Capsule 1 Capsule(s) Inhalation daily    MEDICATIONS  (PRN):  acetaminophen   Tablet .. 650 milliGRAM(s) Oral every 6 hours PRN Mild Pain (1 - 3), Moderate Pain (4 - 6)  ALPRAZolam 0.25 milliGRAM(s) Oral two times a day PRN anxiety  benzocaine 15 mG/menthol 3.6 mG (Sugar-Free) Lozenge 1 Lozenge Oral every 6 hours PRN Sore Throat      LABS:                        14.4   18.26 )-----------( 339      ( 21 Mar 2021 08:36 )             44.4     03-21    133<L>  |  93<L>  |  17.0  ----------------------------<  89  4.3   |  29.0  |  0.81    Ca    8.7      21 Mar 2021 08:36  Phos  2.9     03-21  Mg     2.0     03-21    TPro  7.0  /  Alb  2.8<L>  /  TBili  0.2<L>  /  DBili  x   /  AST  19  /  ALT  35  /  AlkPhos  83  03-20          CAPILLARY BLOOD GLUCOSE      POCT Blood Glucose.: 92 mg/dL (21 Mar 2021 21:58)  POCT Blood Glucose.: 122 mg/dL (21 Mar 2021 16:34)  POCT Blood Glucose.: 147 mg/dL (21 Mar 2021 11:51)  POCT Blood Glucose.: 128 mg/dL (21 Mar 2021 08:35)    PHYSICAL EXAM:    GENERAL: middle aged male, sitting in bed, on hiflo, NAD  HEAD:  Atraumatic, Normocephalic  EYES: EOMI, PERRLA, conjunctiva and sclera clear  ENMT: Moist mucous membranes  NECK: Supple  NERVOUS SYSTEM:  Alert & Oriented X3   CHEST/LUNG: coarse breath sounds  HEART: Regular rate and rhythm; +S1/S2  ABDOMEN: Soft, Nontender, Nondistended; Bowel sounds present  EXTREMITIES: no pedal edema

## 2021-03-22 LAB
ANION GAP SERPL CALC-SCNC: 10 MMOL/L — SIGNIFICANT CHANGE UP (ref 5–17)
BASOPHILS # BLD AUTO: 0.02 K/UL — SIGNIFICANT CHANGE UP (ref 0–0.2)
BASOPHILS NFR BLD AUTO: 0.1 % — SIGNIFICANT CHANGE UP (ref 0–2)
BUN SERPL-MCNC: 16 MG/DL — SIGNIFICANT CHANGE UP (ref 8–20)
CALCIUM SERPL-MCNC: 8.8 MG/DL — SIGNIFICANT CHANGE UP (ref 8.6–10.2)
CHLORIDE SERPL-SCNC: 90 MMOL/L — LOW (ref 98–107)
CO2 SERPL-SCNC: 31 MMOL/L — HIGH (ref 22–29)
CREAT SERPL-MCNC: 0.74 MG/DL — SIGNIFICANT CHANGE UP (ref 0.5–1.3)
EOSINOPHIL # BLD AUTO: 0.27 K/UL — SIGNIFICANT CHANGE UP (ref 0–0.5)
EOSINOPHIL NFR BLD AUTO: 1.7 % — SIGNIFICANT CHANGE UP (ref 0–6)
GLUCOSE BLDC GLUCOMTR-MCNC: 104 MG/DL — HIGH (ref 70–99)
GLUCOSE BLDC GLUCOMTR-MCNC: 118 MG/DL — HIGH (ref 70–99)
GLUCOSE BLDC GLUCOMTR-MCNC: 128 MG/DL — HIGH (ref 70–99)
GLUCOSE BLDC GLUCOMTR-MCNC: 189 MG/DL — HIGH (ref 70–99)
GLUCOSE SERPL-MCNC: 81 MG/DL — SIGNIFICANT CHANGE UP (ref 70–99)
HCT VFR BLD CALC: 45.5 % — SIGNIFICANT CHANGE UP (ref 39–50)
HGB BLD-MCNC: 14.4 G/DL — SIGNIFICANT CHANGE UP (ref 13–17)
IMM GRANULOCYTES NFR BLD AUTO: 1 % — SIGNIFICANT CHANGE UP (ref 0–1.5)
LYMPHOCYTES # BLD AUTO: 15.5 % — SIGNIFICANT CHANGE UP (ref 13–44)
LYMPHOCYTES # BLD AUTO: 2.43 K/UL — SIGNIFICANT CHANGE UP (ref 1–3.3)
MAGNESIUM SERPL-MCNC: 2 MG/DL — SIGNIFICANT CHANGE UP (ref 1.6–2.6)
MCHC RBC-ENTMCNC: 28.3 PG — SIGNIFICANT CHANGE UP (ref 27–34)
MCHC RBC-ENTMCNC: 31.6 GM/DL — LOW (ref 32–36)
MCV RBC AUTO: 89.6 FL — SIGNIFICANT CHANGE UP (ref 80–100)
MONOCYTES # BLD AUTO: 1.13 K/UL — HIGH (ref 0–0.9)
MONOCYTES NFR BLD AUTO: 7.2 % — SIGNIFICANT CHANGE UP (ref 2–14)
NEUTROPHILS # BLD AUTO: 11.66 K/UL — HIGH (ref 1.8–7.4)
NEUTROPHILS NFR BLD AUTO: 74.5 % — SIGNIFICANT CHANGE UP (ref 43–77)
PHOSPHATE SERPL-MCNC: 2.9 MG/DL — SIGNIFICANT CHANGE UP (ref 2.4–4.7)
PLATELET # BLD AUTO: 349 K/UL — SIGNIFICANT CHANGE UP (ref 150–400)
POTASSIUM SERPL-MCNC: 4.3 MMOL/L — SIGNIFICANT CHANGE UP (ref 3.5–5.3)
POTASSIUM SERPL-SCNC: 4.3 MMOL/L — SIGNIFICANT CHANGE UP (ref 3.5–5.3)
RBC # BLD: 5.08 M/UL — SIGNIFICANT CHANGE UP (ref 4.2–5.8)
RBC # FLD: 13.1 % — SIGNIFICANT CHANGE UP (ref 10.3–14.5)
SODIUM SERPL-SCNC: 131 MMOL/L — LOW (ref 135–145)
WBC # BLD: 15.67 K/UL — HIGH (ref 3.8–10.5)
WBC # FLD AUTO: 15.67 K/UL — HIGH (ref 3.8–10.5)

## 2021-03-22 PROCEDURE — 99233 SBSQ HOSP IP/OBS HIGH 50: CPT

## 2021-03-22 PROCEDURE — 71045 X-RAY EXAM CHEST 1 VIEW: CPT | Mod: 26

## 2021-03-22 RX ORDER — INFLUENZA VIRUS VACCINE 15; 15; 15; 15 UG/.5ML; UG/.5ML; UG/.5ML; UG/.5ML
0.5 SUSPENSION INTRAMUSCULAR ONCE
Refills: 0 | Status: DISCONTINUED | OUTPATIENT
Start: 2021-03-22 | End: 2021-03-28

## 2021-03-22 RX ADMIN — Medication 106 MILLIGRAM(S): at 06:37

## 2021-03-22 RX ADMIN — Medication 5 MILLIGRAM(S): at 21:03

## 2021-03-22 RX ADMIN — Medication 1 SPRAY(S): at 06:38

## 2021-03-22 RX ADMIN — ENOXAPARIN SODIUM 40 MILLIGRAM(S): 100 INJECTION SUBCUTANEOUS at 06:37

## 2021-03-22 RX ADMIN — TIOTROPIUM BROMIDE 1 CAPSULE(S): 18 CAPSULE ORAL; RESPIRATORY (INHALATION) at 08:01

## 2021-03-22 RX ADMIN — Medication 1: at 12:03

## 2021-03-22 RX ADMIN — BUDESONIDE AND FORMOTEROL FUMARATE DIHYDRATE 2 PUFF(S): 160; 4.5 AEROSOL RESPIRATORY (INHALATION) at 08:00

## 2021-03-22 RX ADMIN — Medication 650 MILLIGRAM(S): at 07:30

## 2021-03-22 RX ADMIN — BENZOCAINE AND MENTHOL 1 LOZENGE: 5; 1 LIQUID ORAL at 12:03

## 2021-03-22 RX ADMIN — BENZOCAINE AND MENTHOL 1 LOZENGE: 5; 1 LIQUID ORAL at 06:38

## 2021-03-22 RX ADMIN — Medication 1 SPRAY(S): at 17:33

## 2021-03-22 RX ADMIN — ENOXAPARIN SODIUM 40 MILLIGRAM(S): 100 INJECTION SUBCUTANEOUS at 17:34

## 2021-03-22 RX ADMIN — Medication 650 MILLIGRAM(S): at 06:39

## 2021-03-22 RX ADMIN — Medication 0.25 MILLIGRAM(S): at 12:02

## 2021-03-22 RX ADMIN — PANTOPRAZOLE SODIUM 40 MILLIGRAM(S): 20 TABLET, DELAYED RELEASE ORAL at 06:38

## 2021-03-22 NOTE — PROGRESS NOTE ADULT - SUBJECTIVE AND OBJECTIVE BOX
CHIEF COMPLAINT/INTERVAL HISTORY:    Patient is a 55y old  Male who presents with a chief complaint of covid hypoxia (22 Mar 2021 12:41)    SUBJECTIVE & OBJECTIVE: Pt seen and examined at bedside. No overnight events. Patient with improving O2 saturations; currently down to 4 liters saturating 92-94%. Downgrade to telemetry.    ROS: No chest pain, palpitations, SOB, light headedness, dizziness, headache, nausea/vomiting, fevers/chills, abdominal pain, dysuria or increased urinary frequency.    ICU Vital Signs Last 24 Hrs  T(C): 36.4 (22 Mar 2021 16:38), Max: 36.9 (21 Mar 2021 21:54)  T(F): 97.5 (22 Mar 2021 16:38), Max: 98.5 (21 Mar 2021 21:54)  HR: 80 (22 Mar 2021 16:38) (67 - 83)  BP: 96/60 (22 Mar 2021 16:38) (96/60 - 131/72)  RR: 20 (22 Mar 2021 16:38) (18 - 22)  SpO2: 92% (22 Mar 2021 16:38) (88% - 100%)    MEDICATIONS  (STANDING):  budesonide 160 MICROgram(s)/formoterol 4.5 MICROgram(s) Inhaler 2 Puff(s) Inhalation two times a day  dexAMETHasone  IVPB 12 milliGRAM(s) IV Intermittent daily  dextrose 40% Gel 15 Gram(s) Oral once  dextrose 5%. 1000 milliLiter(s) (50 mL/Hr) IV Continuous <Continuous>  dextrose 5%. 1000 milliLiter(s) (100 mL/Hr) IV Continuous <Continuous>  dextrose 50% Injectable 25 Gram(s) IV Push once  dextrose 50% Injectable 12.5 Gram(s) IV Push once  dextrose 50% Injectable 25 Gram(s) IV Push once  enoxaparin Injectable 40 milliGRAM(s) SubCutaneous every 12 hours  fluticasone propionate 50 MICROgram(s)/spray Nasal Spray 1 Spray(s) Both Nostrils two times a day  glucagon  Injectable 1 milliGRAM(s) IntraMuscular once  influenza   Vaccine 0.5 milliLiter(s) IntraMuscular once  insulin lispro (ADMELOG) corrective regimen sliding scale   SubCutaneous three times a day before meals  insulin lispro (ADMELOG) corrective regimen sliding scale   SubCutaneous at bedtime  melatonin 5 milliGRAM(s) Oral at bedtime  pantoprazole    Tablet 40 milliGRAM(s) Oral before breakfast  tiotropium 18 MICROgram(s) Capsule 1 Capsule(s) Inhalation daily    MEDICATIONS  (PRN):  acetaminophen   Tablet .. 650 milliGRAM(s) Oral every 6 hours PRN Mild Pain (1 - 3), Moderate Pain (4 - 6)  ALPRAZolam 0.25 milliGRAM(s) Oral two times a day PRN anxiety  benzocaine 15 mG/menthol 3.6 mG (Sugar-Free) Lozenge 1 Lozenge Oral every 6 hours PRN Sore Throat      LABS:                        14.4   15.67 )-----------( 349      ( 22 Mar 2021 06:29 )             45.5     03-22    131<L>  |  90<L>  |  16.0  ----------------------------<  81  4.3   |  31.0<H>  |  0.74    Ca    8.8      22 Mar 2021 06:29  Phos  2.9     03-22  Mg     2.0     03-22            CAPILLARY BLOOD GLUCOSE      POCT Blood Glucose.: 104 mg/dL (22 Mar 2021 16:45)  POCT Blood Glucose.: 189 mg/dL (22 Mar 2021 12:02)  POCT Blood Glucose.: 128 mg/dL (22 Mar 2021 08:35)  POCT Blood Glucose.: 92 mg/dL (21 Mar 2021 21:58)    PHYSICAL EXAM:    GENERAL: middle aged male, sitting in chair, on hiflo, NAD  HEAD:  Atraumatic, Normocephalic  EYES: EOMI, PERRLA, conjunctiva and sclera clear  ENMT: Moist mucous membranes  NECK: Supple  NERVOUS SYSTEM:  Alert & Oriented X3   CHEST/LUNG: coarse breath sounds  HEART: Regular rate and rhythm; +S1/S2  ABDOMEN: Soft, Nontender, Nondistended; Bowel sounds present  EXTREMITIES: no pedal edema

## 2021-03-22 NOTE — PROGRESS NOTE ADULT - ASSESSMENT
55 year old male with a past medical history of hypertension, hyperlipidemia and diabetes mellitus type 2 admitted for acute hypoxic respiratory failure secondary to COVID. Initially was admitted to ICU on BIpap, was weaned off BIpap to [facility] and transferred to Medicine on 3.1 Completed 10 days of IV remdesivir and decadron however remained hypoxia.  CTA imaging and LE dopplers neg for acute thromboembolic disease. Decadron was restarted at 12mg by pulmonary medicine; however patient remains hypoxic on hi-andrew.    1.  Acute hypoxic respiratory failure secondary to COVID pneumonia   -remains hypoxic but O2 requirements improving; down to 4 liters currently  -Completed 10 days of IV decadron and remdesivir  -Not a candidate for Actemra per ID  -Continue decadron 12mg IV daily as per pulmonary   -LE Duplex negative for DVT  -Nocturnal bipap  -Incentive spirometry, chest PT and self proning discussed with nursing staff and patient  -Continue bronchodilators  -Prognosis guarded; pulmonary on board and recommendations appreciated  -Unable to lie flat for CT chest; repeat CXR today unchanged. No PNX.    2. Hypertension  - BP stable off anti-hypertensives    3. Chronic headaches   -denies headaches at this time  -continue tramadol Q8 prn    4.  Prediabetes Hba1c of 6  Monitor sugars on steroids  HbA1c 6.0  Continue ISS  Add lantus if needed    DVT ppx - Lovenox BID (BMI > 30)    Dispo - Remains hypoxic but finally able to wean off hi-andrew today. Currently on 4 liters; will monitor closely over the next 24 hours. Repeat CXR today unchanged; no PNX. Start steroid taper on 3/23. Will need home O2; to be discussed with CCM.      Attending Attestation:   Plan discussed with patient, RN.

## 2021-03-22 NOTE — PROGRESS NOTE ADULT - SUBJECTIVE AND OBJECTIVE BOX
PULMONARY PROGRESS NOTE      [id number]    Patient is a 55y old  Male who presents with a chief complaint of covid hypoxia (21 Mar 2021 22:23)      INTERVAL HPI/OVERNIGHT EVENTS:  Awake alert on HFNCO 40LPM/ 45%  still desats with activity  LOS day 23  Completed remdesivir  No cough or wheeze    MEDICATIONS  (STANDING):  budesonide 160 MICROgram(s)/formoterol 4.5 MICROgram(s) Inhaler 2 Puff(s) Inhalation two times a day  dexAMETHasone  IVPB 12 milliGRAM(s) IV Intermittent daily  dextrose 40% Gel 15 Gram(s) Oral once  dextrose 5%. 1000 milliLiter(s) (50 mL/Hr) IV Continuous <Continuous>  dextrose 5%. 1000 milliLiter(s) (100 mL/Hr) IV Continuous <Continuous>  dextrose 50% Injectable 25 Gram(s) IV Push once  dextrose 50% Injectable 12.5 Gram(s) IV Push once  dextrose 50% Injectable 25 Gram(s) IV Push once  enoxaparin Injectable 40 milliGRAM(s) SubCutaneous every 12 hours  fluticasone propionate 50 MICROgram(s)/spray Nasal Spray 1 Spray(s) Both Nostrils two times a day  glucagon  Injectable 1 milliGRAM(s) IntraMuscular once  insulin lispro (ADMELOG) corrective regimen sliding scale   SubCutaneous three times a day before meals  insulin lispro (ADMELOG) corrective regimen sliding scale   SubCutaneous at bedtime  melatonin 5 milliGRAM(s) Oral at bedtime  pantoprazole    Tablet 40 milliGRAM(s) Oral before breakfast  tiotropium 18 MICROgram(s) Capsule 1 Capsule(s) Inhalation daily      MEDICATIONS  (PRN):  acetaminophen   Tablet .. 650 milliGRAM(s) Oral every 6 hours PRN Mild Pain (1 - 3), Moderate Pain (4 - 6)  ALPRAZolam 0.25 milliGRAM(s) Oral two times a day PRN anxiety  benzocaine 15 mG/menthol 3.6 mG (Sugar-Free) Lozenge 1 Lozenge Oral every 6 hours PRN Sore Throat      Allergies    No Known Allergies    Intolerances        PAST MEDICAL & SURGICAL HISTORY:  Hypertension    No significant past surgical history        SOCIAL HISTORY  Smoking History:       REVIEW OF SYSTEMS:    CONSTITUTIONAL:  No distress    HEENT:  Eyes:  No diplopia or blurred vision. ENT:  No earache, sore throat or runny nose.    CARDIOVASCULAR:  No pressure, squeezing, tightness, heaviness or aching about the chest; no palpitations.    RESPIRATORY:  above    GASTROINTESTINAL:  No nausea, vomiting or diarrhea.    GENITOURINARY:  No dysuria, frequency or urgency.    NEUROLOGIC:  No paresthesias, fasciculations, seizures or weakness.    Extremities: No cyanosis, clubbing or edema    PSYCHIATRIC:  No disorder of thought or mood.    Vital Signs Last 24 Hrs  T(C): 36.5 (22 Mar 2021 07:58), Max: 36.9 (21 Mar 2021 12:50)  T(F): 97.7 (22 Mar 2021 07:58), Max: 98.5 (21 Mar 2021 21:54)  HR: 67 (22 Mar 2021 08:32) (67 - 99)  BP: 108/66 (22 Mar 2021 07:58) (97/64 - 131/72)  BP(mean): --  RR: 20 (22 Mar 2021 07:58) (18 - 22)  SpO2: 97% (22 Mar 2021 08:32) (88% - 99%)    PHYSICAL EXAMINATION:    GENERAL: The patient is awake and alert in no apparent distress.     HEENT: Head is normocephalic and atraumatic. Extraocular muscles are intact. Mucous membranes are moist.    NECK: Supple.    LUNGS: few post rale mirta at bases, Clear to auscultation without wheezing, rales or rhonchi; respirations unlabored    HEART: Regular rate and rhythm without murmur.    ABDOMEN: Soft, nontender, and nondistended.      EXTREMITIES: Without any cyanosis, clubbing, rash, lesions or edema.    NEUROLOGIC: Grossly intact.    LABS:                        14.4   15.67 )-----------( 349      ( 22 Mar 2021 06:29 )             45.5     03-22    131<L>  |  90<L>  |  16.0  ----------------------------<  81  4.3   |  31.0<H>  |  0.74    Ca    8.8      22 Mar 2021 06:29  Phos  2.9     03-22  Mg     2.0     03-22                      Procalcitonin, Serum: 0.11 ng/mL (03-21-21 @ 08:36)      MICROBIOLOGY:    RADIOLOGY & ADDITIONAL STUDIES:   EXAM:  XR CHEST PORTABLE ROUTINE 1V                          PROCEDURE DATE:  03/19/2021          INTERPRETATION:  HISTORY: Covid 19 follow-up    TECHNIQUE: Single frontal view of the chest with comparison to 3/17/2021    FINDINGS:    The heart ismildly enlarged. Diffuse bilateral airspace disease. Mild elevation of the right hemidiaphragm. The apices are unremarkable. Degenerative changes.        IMPRESSION:    Diffuse bilateral airspace disease.            SKYLER PATE MD; Attending Radiologist  This document has been electronically signed. Mar 19 2021  2:34PM

## 2021-03-22 NOTE — PROGRESS NOTE ADULT - ASSESSMENT
Acute hypoxic respiratory failure  Covid- 19 pneumonia  Post remdesivir, remains on decadron  Not given Actemra in past, now not candidate  Improving O2 requirement  CXR c/w possible pneumomediastinum; CXR without ptx    Rec:    Continue HFO2 with nocturnal BiPAP - decrease support as able  Unable to lie flat for CT scan, continue to follow CXR intermittently depending on clinical picture  Consider repeat CXR if FiO2 requirement does not improve to f/u prior pneumomediastinum  Continue self proning  Wean fio2 as able  Continue Decadron, Follow inflammatory markers  On lovenox 40 bid, previous DVT/PE excluded  Critical though improved  Remains on high level of support; critical  DNI  Anxiolytics for dyspnea

## 2021-03-23 LAB
ANION GAP SERPL CALC-SCNC: 12 MMOL/L — SIGNIFICANT CHANGE UP (ref 5–17)
BASOPHILS # BLD AUTO: 0.03 K/UL — SIGNIFICANT CHANGE UP (ref 0–0.2)
BASOPHILS NFR BLD AUTO: 0.2 % — SIGNIFICANT CHANGE UP (ref 0–2)
BUN SERPL-MCNC: 14 MG/DL — SIGNIFICANT CHANGE UP (ref 8–20)
CALCIUM SERPL-MCNC: 8.7 MG/DL — SIGNIFICANT CHANGE UP (ref 8.6–10.2)
CHLORIDE SERPL-SCNC: 93 MMOL/L — LOW (ref 98–107)
CO2 SERPL-SCNC: 29 MMOL/L — SIGNIFICANT CHANGE UP (ref 22–29)
CREAT SERPL-MCNC: 0.64 MG/DL — SIGNIFICANT CHANGE UP (ref 0.5–1.3)
EOSINOPHIL # BLD AUTO: 0.28 K/UL — SIGNIFICANT CHANGE UP (ref 0–0.5)
EOSINOPHIL NFR BLD AUTO: 1.9 % — SIGNIFICANT CHANGE UP (ref 0–6)
GLUCOSE BLDC GLUCOMTR-MCNC: 100 MG/DL — HIGH (ref 70–99)
GLUCOSE BLDC GLUCOMTR-MCNC: 105 MG/DL — HIGH (ref 70–99)
GLUCOSE BLDC GLUCOMTR-MCNC: 123 MG/DL — HIGH (ref 70–99)
GLUCOSE BLDC GLUCOMTR-MCNC: 156 MG/DL — HIGH (ref 70–99)
GLUCOSE SERPL-MCNC: 89 MG/DL — SIGNIFICANT CHANGE UP (ref 70–99)
HCT VFR BLD CALC: 45.3 % — SIGNIFICANT CHANGE UP (ref 39–50)
HGB BLD-MCNC: 14.5 G/DL — SIGNIFICANT CHANGE UP (ref 13–17)
IMM GRANULOCYTES NFR BLD AUTO: 0.8 % — SIGNIFICANT CHANGE UP (ref 0–1.5)
LYMPHOCYTES # BLD AUTO: 14.6 % — SIGNIFICANT CHANGE UP (ref 13–44)
LYMPHOCYTES # BLD AUTO: 2.19 K/UL — SIGNIFICANT CHANGE UP (ref 1–3.3)
MAGNESIUM SERPL-MCNC: 2 MG/DL — SIGNIFICANT CHANGE UP (ref 1.6–2.6)
MCHC RBC-ENTMCNC: 28.3 PG — SIGNIFICANT CHANGE UP (ref 27–34)
MCHC RBC-ENTMCNC: 32 GM/DL — SIGNIFICANT CHANGE UP (ref 32–36)
MCV RBC AUTO: 88.5 FL — SIGNIFICANT CHANGE UP (ref 80–100)
MONOCYTES # BLD AUTO: 1.11 K/UL — HIGH (ref 0–0.9)
MONOCYTES NFR BLD AUTO: 7.4 % — SIGNIFICANT CHANGE UP (ref 2–14)
NEUTROPHILS # BLD AUTO: 11.23 K/UL — HIGH (ref 1.8–7.4)
NEUTROPHILS NFR BLD AUTO: 75.1 % — SIGNIFICANT CHANGE UP (ref 43–77)
PHOSPHATE SERPL-MCNC: 2.6 MG/DL — SIGNIFICANT CHANGE UP (ref 2.4–4.7)
PLATELET # BLD AUTO: 329 K/UL — SIGNIFICANT CHANGE UP (ref 150–400)
POTASSIUM SERPL-MCNC: 4.1 MMOL/L — SIGNIFICANT CHANGE UP (ref 3.5–5.3)
POTASSIUM SERPL-SCNC: 4.1 MMOL/L — SIGNIFICANT CHANGE UP (ref 3.5–5.3)
RBC # BLD: 5.12 M/UL — SIGNIFICANT CHANGE UP (ref 4.2–5.8)
RBC # FLD: 13.2 % — SIGNIFICANT CHANGE UP (ref 10.3–14.5)
SODIUM SERPL-SCNC: 134 MMOL/L — LOW (ref 135–145)
WBC # BLD: 14.96 K/UL — HIGH (ref 3.8–10.5)
WBC # FLD AUTO: 14.96 K/UL — HIGH (ref 3.8–10.5)

## 2021-03-23 PROCEDURE — 99232 SBSQ HOSP IP/OBS MODERATE 35: CPT

## 2021-03-23 PROCEDURE — 99233 SBSQ HOSP IP/OBS HIGH 50: CPT

## 2021-03-23 RX ORDER — ALPRAZOLAM 0.25 MG
0.25 TABLET ORAL THREE TIMES A DAY
Refills: 0 | Status: DISCONTINUED | OUTPATIENT
Start: 2021-03-23 | End: 2021-03-28

## 2021-03-23 RX ORDER — DEXAMETHASONE 0.5 MG/5ML
8 ELIXIR ORAL DAILY
Refills: 0 | Status: COMPLETED | OUTPATIENT
Start: 2021-03-24 | End: 2021-03-24

## 2021-03-23 RX ADMIN — Medication 1 SPRAY(S): at 05:16

## 2021-03-23 RX ADMIN — BUDESONIDE AND FORMOTEROL FUMARATE DIHYDRATE 2 PUFF(S): 160; 4.5 AEROSOL RESPIRATORY (INHALATION) at 09:02

## 2021-03-23 RX ADMIN — PANTOPRAZOLE SODIUM 40 MILLIGRAM(S): 20 TABLET, DELAYED RELEASE ORAL at 05:14

## 2021-03-23 RX ADMIN — BUDESONIDE AND FORMOTEROL FUMARATE DIHYDRATE 2 PUFF(S): 160; 4.5 AEROSOL RESPIRATORY (INHALATION) at 20:53

## 2021-03-23 RX ADMIN — Medication 5 MILLIGRAM(S): at 21:30

## 2021-03-23 RX ADMIN — Medication 0.25 MILLIGRAM(S): at 07:48

## 2021-03-23 RX ADMIN — ENOXAPARIN SODIUM 40 MILLIGRAM(S): 100 INJECTION SUBCUTANEOUS at 05:14

## 2021-03-23 RX ADMIN — BENZOCAINE AND MENTHOL 1 LOZENGE: 5; 1 LIQUID ORAL at 07:48

## 2021-03-23 RX ADMIN — ENOXAPARIN SODIUM 40 MILLIGRAM(S): 100 INJECTION SUBCUTANEOUS at 17:38

## 2021-03-23 RX ADMIN — TIOTROPIUM BROMIDE 1 CAPSULE(S): 18 CAPSULE ORAL; RESPIRATORY (INHALATION) at 09:02

## 2021-03-23 RX ADMIN — Medication 650 MILLIGRAM(S): at 21:30

## 2021-03-23 RX ADMIN — Medication 0.25 MILLIGRAM(S): at 15:38

## 2021-03-23 RX ADMIN — Medication 106 MILLIGRAM(S): at 05:13

## 2021-03-23 RX ADMIN — Medication 1: at 12:26

## 2021-03-23 RX ADMIN — Medication 1 SPRAY(S): at 17:38

## 2021-03-23 NOTE — PROGRESS NOTE ADULT - SUBJECTIVE AND OBJECTIVE BOX
PULMONARY PROGRESS NOTE      [facility]-[id number]    Patient is a 55y old  Male who presents with a chief complaint of covid hypoxia (22 Mar 2021 17:24)      INTERVAL HPI/OVERNIGHT EVENTS:  O2 weaned to 5LPM NCO  no cough wheeze or sputum    MEDICATIONS  (STANDING):  budesonide 160 MICROgram(s)/formoterol 4.5 MICROgram(s) Inhaler 2 Puff(s) Inhalation two times a day  dexAMETHasone  IVPB 12 milliGRAM(s) IV Intermittent daily  dextrose 40% Gel 15 Gram(s) Oral once  dextrose 5%. 1000 milliLiter(s) (50 mL/Hr) IV Continuous <Continuous>  dextrose 5%. 1000 milliLiter(s) (100 mL/Hr) IV Continuous <Continuous>  dextrose 50% Injectable 25 Gram(s) IV Push once  dextrose 50% Injectable 12.5 Gram(s) IV Push once  dextrose 50% Injectable 25 Gram(s) IV Push once  enoxaparin Injectable 40 milliGRAM(s) SubCutaneous every 12 hours  fluticasone propionate 50 MICROgram(s)/spray Nasal Spray 1 Spray(s) Both Nostrils two times a day  glucagon  Injectable 1 milliGRAM(s) IntraMuscular once  influenza   Vaccine 0.5 milliLiter(s) IntraMuscular once  insulin lispro (ADMELOG) corrective regimen sliding scale   SubCutaneous three times a day before meals  insulin lispro (ADMELOG) corrective regimen sliding scale   SubCutaneous at bedtime  melatonin 5 milliGRAM(s) Oral at bedtime  pantoprazole    Tablet 40 milliGRAM(s) Oral before breakfast  tiotropium 18 MICROgram(s) Capsule 1 Capsule(s) Inhalation daily      MEDICATIONS  (PRN):  acetaminophen   Tablet .. 650 milliGRAM(s) Oral every 6 hours PRN Mild Pain (1 - 3), Moderate Pain (4 - 6)  ALPRAZolam 0.25 milliGRAM(s) Oral three times a day PRN anxiety  benzocaine 15 mG/menthol 3.6 mG (Sugar-Free) Lozenge 1 Lozenge Oral every 6 hours PRN Sore Throat      Allergies    No Known Allergies    Intolerances        PAST MEDICAL & SURGICAL HISTORY:  Hypertension    No significant past surgical history        SOCIAL HISTORY  Smoking History:       REVIEW OF SYSTEMS:    CONSTITUTIONAL:  No distress    HEENT:  Eyes:  No diplopia or blurred vision. ENT:  No earache, sore throat or runny nose.    CARDIOVASCULAR:  No pressure, squeezing, tightness, heaviness or aching about the chest; no palpitations.    RESPIRATORY:  above    GASTROINTESTINAL:  No nausea, vomiting or diarrhea.    GENITOURINARY:  No dysuria, frequency or urgency.    NEUROLOGIC:  No paresthesias, fasciculations, seizures or weakness.    Extremities: No cyanosis, clubbing or edema    PSYCHIATRIC:  No disorder of thought or mood.    Vital Signs Last 24 Hrs  T(C): 36.8 (23 Mar 2021 07:51), Max: 36.9 (22 Mar 2021 12:15)  T(F): 98.3 (23 Mar 2021 07:51), Max: 98.4 (22 Mar 2021 12:15)  HR: 91 (23 Mar 2021 07:51) (74 - 91)  BP: 105/69 (23 Mar 2021 07:51) (96/60 - 106/74)  BP(mean): --  RR: 20 (23 Mar 2021 07:51) (20 - 22)  SpO2: 92% (23 Mar 2021 09:02) (90% - 98%)    PHYSICAL EXAMINATION:    GENERAL: The patient is awake and alert in no apparent distress.     HEENT: Head is normocephalic and atraumatic. Extraocular muscles are intact. Mucous membranes are moist.    NECK: Supple.    LUNGS: Clear to auscultation without wheezing, rales or rhonchi; respirations unlabored    HEART: Regular rate and rhythm without murmur.    ABDOMEN: Soft, nontender, and nondistended.      EXTREMITIES: Without any cyanosis, clubbing, rash, lesions or edema.    NEUROLOGIC: Grossly intact.    LABS:                        14.5   14.96 )-----------( 329      ( 23 Mar 2021 05:57 )             45.3     03-23    134<L>  |  93<L>  |  14.0  ----------------------------<  89  4.1   |  29.0  |  0.64    Ca    8.7      23 Mar 2021 05:57  Phos  2.6     03-23  Mg     2.0     03-23                      Procalcitonin, Serum: 0.11 ng/mL (03-21-21 @ 08:36)      MICROBIOLOGY:    RADIOLOGY & ADDITIONAL STUDIES:  RADIOLOGY & ADDITIONAL STUDIES:   EXAM:  XR CHEST PORTABLE ROUTINE 1V                          PROCEDURE DATE:  03/19/2021          INTERPRETATION:  HISTORY: Covid 19 follow-up    TECHNIQUE: Single frontal view of the chest with comparison to 3/17/2021    FINDINGS:    The heart ismildly enlarged. Diffuse bilateral airspace disease. Mild elevation of the right hemidiaphragm. The apices are unremarkable. Degenerative changes.        IMPRESSION:    Diffuse bilateral airspace disease.            SKYLER PATE MD; Attending Radiologist  This document has been electronically signed. Mar 19 2021  2:34PM

## 2021-03-23 NOTE — PROGRESS NOTE ADULT - SUBJECTIVE AND OBJECTIVE BOX
CHIEF COMPLAINT/INTERVAL HISTORY:    Patient is a 55y old  Male who presents with a chief complaint of covid hypoxia (23 Mar 2021 11:57)    SUBJECTIVE & OBJECTIVE: Pt seen and examined at bedside. No overnight events. Patient desaturating earlier today; requiring 6 liters and then downtitrated to 4 liters. Taper steroids. Reports feeling anxious; xanax increased to TID.    ROS: No chest pain, palpitations, light headedness, dizziness, headache, nausea/vomiting, fevers/chills, abdominal pain, dysuria or increased urinary frequency.    ICU Vital Signs Last 24 Hrs  T(C): 36.4 (23 Mar 2021 16:19), Max: 36.8 (23 Mar 2021 07:51)  T(F): 97.6 (23 Mar 2021 16:19), Max: 98.3 (23 Mar 2021 07:51)  HR: 86 (23 Mar 2021 16:19) (74 - 91)  BP: 115/78 (23 Mar 2021 16:19) (104/66 - 115/78)  RR: 20 (23 Mar 2021 16:19) (20 - 20)  SpO2: 97% (23 Mar 2021 16:19) (90% - 98%)      MEDICATIONS  (STANDING):  budesonide 160 MICROgram(s)/formoterol 4.5 MICROgram(s) Inhaler 2 Puff(s) Inhalation two times a day  dextrose 40% Gel 15 Gram(s) Oral once  dextrose 5%. 1000 milliLiter(s) (50 mL/Hr) IV Continuous <Continuous>  dextrose 5%. 1000 milliLiter(s) (100 mL/Hr) IV Continuous <Continuous>  dextrose 50% Injectable 25 Gram(s) IV Push once  dextrose 50% Injectable 12.5 Gram(s) IV Push once  dextrose 50% Injectable 25 Gram(s) IV Push once  enoxaparin Injectable 40 milliGRAM(s) SubCutaneous every 12 hours  fluticasone propionate 50 MICROgram(s)/spray Nasal Spray 1 Spray(s) Both Nostrils two times a day  glucagon  Injectable 1 milliGRAM(s) IntraMuscular once  influenza   Vaccine 0.5 milliLiter(s) IntraMuscular once  insulin lispro (ADMELOG) corrective regimen sliding scale   SubCutaneous three times a day before meals  insulin lispro (ADMELOG) corrective regimen sliding scale   SubCutaneous at bedtime  melatonin 5 milliGRAM(s) Oral at bedtime  pantoprazole    Tablet 40 milliGRAM(s) Oral before breakfast  tiotropium 18 MICROgram(s) Capsule 1 Capsule(s) Inhalation daily    MEDICATIONS  (PRN):  acetaminophen   Tablet .. 650 milliGRAM(s) Oral every 6 hours PRN Mild Pain (1 - 3), Moderate Pain (4 - 6)  ALPRAZolam 0.25 milliGRAM(s) Oral three times a day PRN anxiety  benzocaine 15 mG/menthol 3.6 mG (Sugar-Free) Lozenge 1 Lozenge Oral every 6 hours PRN Sore Throat      LABS:                        14.5   14.96 )-----------( 329      ( 23 Mar 2021 05:57 )             45.3     03-23    134<L>  |  93<L>  |  14.0  ----------------------------<  89  4.1   |  29.0  |  0.64    Ca    8.7      23 Mar 2021 05:57  Phos  2.6     03-23  Mg     2.0     03-23      CAPILLARY BLOOD GLUCOSE      POCT Blood Glucose.: 105 mg/dL (23 Mar 2021 17:30)  POCT Blood Glucose.: 156 mg/dL (23 Mar 2021 12:24)  POCT Blood Glucose.: 123 mg/dL (23 Mar 2021 08:25)  POCT Blood Glucose.: 118 mg/dL (22 Mar 2021 20:57)      PHYSICAL EXAM:    GENERAL: middle aged male, sitting in chair, NAD  HEAD:  Atraumatic, Normocephalic  EYES: EOMI, PERRLA, conjunctiva and sclera clear  ENMT: Moist mucous membranes  NECK: Supple  NERVOUS SYSTEM:  Alert & Oriented X3   CHEST/LUNG: bilateral air entry, no wheezing, coarse breath sounds  HEART: Regular rate and rhythm; +S1/S2  ABDOMEN: Soft, Nontender, Nondistended; Bowel sounds present  EXTREMITIES: no pedal edema

## 2021-03-23 NOTE — PROGRESS NOTE ADULT - ASSESSMENT
55 year old male with a past medical history of hypertension, hyperlipidemia and diabetes mellitus type 2 admitted for acute hypoxic respiratory failure secondary to COVID. Initially was admitted to ICU on BIpap, was weaned off BIpap to [facility] and transferred to Medicine on 3.1 Completed 10 days of IV remdesivir and decadron however remained hypoxia.  CTA imaging and LE dopplers neg for acute thromboembolic disease. Decadron was restarted at 12mg by pulmonary medicine; however patient remains hypoxic on hi-andrew.    1.  Acute hypoxic respiratory failure secondary to COVID pneumonia   -remains hypoxic but O2 requirements improving; down to 4 liters currently  -Completed 10 days of IV decadron and remdesivir  -Not a candidate for Actemra per ID  -Start decadron taper starting 3/24  -LE Duplex negative for DVT  -Nocturnal bipap  -Incentive spirometry, chest PT and self proning discussed with nursing staff and patient  -Continue bronchodilators  -Prognosis guarded; pulmonary on board and recommendations appreciated  -Unable to lie flat for CT chest; repeat CXR on 3/22. No PNX.    2. Hypertension  - BP stable off anti-hypertensives    3. Chronic headaches   -denies headaches at this time  -continue tramadol Q8 prn    4.  Prediabetes Hba1c of 6  Monitor sugars on steroids  HbA1c 6.0  Continue ISS  Add lantus if needed    DVT ppx - Lovenox BID (BMI > 30)    Dispo - Remains hypoxic but remains off hi-andrew today. Currently on 4 liters; will monitor closely over the next 24 hours given desaturation earlier today. If patient remains stable, then will arrange for home O2 and start discharge planning in the next 24-48 hours.      Plan discussed with patient, RN, CCM, Dr. Villeda

## 2021-03-23 NOTE — PROGRESS NOTE ADULT - ASSESSMENT
Acute hypoxic respiratory failure improving  Covid- 19 pneumonia  Improving O2 requirement    Rec:    f/u chest xray  Wean fio2   wean decadron by one MG per day  Anxiolytics for dyspnea

## 2021-03-24 LAB
GLUCOSE BLDC GLUCOMTR-MCNC: 106 MG/DL — HIGH (ref 70–99)
GLUCOSE BLDC GLUCOMTR-MCNC: 120 MG/DL — HIGH (ref 70–99)
GLUCOSE BLDC GLUCOMTR-MCNC: 78 MG/DL — SIGNIFICANT CHANGE UP (ref 70–99)
GLUCOSE BLDC GLUCOMTR-MCNC: 96 MG/DL — SIGNIFICANT CHANGE UP (ref 70–99)
HCT VFR BLD CALC: 46.3 % — SIGNIFICANT CHANGE UP (ref 39–50)
HGB BLD-MCNC: 14.6 G/DL — SIGNIFICANT CHANGE UP (ref 13–17)
MCHC RBC-ENTMCNC: 28.6 PG — SIGNIFICANT CHANGE UP (ref 27–34)
MCHC RBC-ENTMCNC: 31.5 GM/DL — LOW (ref 32–36)
MCV RBC AUTO: 90.6 FL — SIGNIFICANT CHANGE UP (ref 80–100)
PLATELET # BLD AUTO: 334 K/UL — SIGNIFICANT CHANGE UP (ref 150–400)
RBC # BLD: 5.11 M/UL — SIGNIFICANT CHANGE UP (ref 4.2–5.8)
RBC # FLD: 13.3 % — SIGNIFICANT CHANGE UP (ref 10.3–14.5)
WBC # BLD: 14.27 K/UL — HIGH (ref 3.8–10.5)
WBC # FLD AUTO: 14.27 K/UL — HIGH (ref 3.8–10.5)

## 2021-03-24 PROCEDURE — 99232 SBSQ HOSP IP/OBS MODERATE 35: CPT

## 2021-03-24 PROCEDURE — 99233 SBSQ HOSP IP/OBS HIGH 50: CPT

## 2021-03-24 PROCEDURE — 71045 X-RAY EXAM CHEST 1 VIEW: CPT | Mod: 26

## 2021-03-24 RX ORDER — DEXAMETHASONE 0.5 MG/5ML
6 ELIXIR ORAL ONCE
Refills: 0 | Status: DISCONTINUED | OUTPATIENT
Start: 2021-03-25 | End: 2021-03-28

## 2021-03-24 RX ADMIN — ENOXAPARIN SODIUM 40 MILLIGRAM(S): 100 INJECTION SUBCUTANEOUS at 05:19

## 2021-03-24 RX ADMIN — TIOTROPIUM BROMIDE 1 CAPSULE(S): 18 CAPSULE ORAL; RESPIRATORY (INHALATION) at 08:21

## 2021-03-24 RX ADMIN — Medication 1 SPRAY(S): at 18:09

## 2021-03-24 RX ADMIN — ENOXAPARIN SODIUM 40 MILLIGRAM(S): 100 INJECTION SUBCUTANEOUS at 18:09

## 2021-03-24 RX ADMIN — BUDESONIDE AND FORMOTEROL FUMARATE DIHYDRATE 2 PUFF(S): 160; 4.5 AEROSOL RESPIRATORY (INHALATION) at 21:22

## 2021-03-24 RX ADMIN — BUDESONIDE AND FORMOTEROL FUMARATE DIHYDRATE 2 PUFF(S): 160; 4.5 AEROSOL RESPIRATORY (INHALATION) at 08:16

## 2021-03-24 RX ADMIN — Medication 1 SPRAY(S): at 05:21

## 2021-03-24 RX ADMIN — Medication 101.6 MILLIGRAM(S): at 05:19

## 2021-03-24 RX ADMIN — PANTOPRAZOLE SODIUM 40 MILLIGRAM(S): 20 TABLET, DELAYED RELEASE ORAL at 05:19

## 2021-03-24 RX ADMIN — Medication 5 MILLIGRAM(S): at 21:23

## 2021-03-24 NOTE — PROGRESS NOTE ADULT - SUBJECTIVE AND OBJECTIVE BOX
CHIEF COMPLAINT/INTERVAL HISTORY:    Patient is a 55y old  Male who presents with a chief complaint of covid hypoxia (24 Mar 2021 15:37)    SUBJECTIVE & OBJECTIVE: Pt seen and examined at bedside. No overnight events. Patient remains on 4 liters of NC. States he still gets very SOB with movement. Unwilling to participate in PT today. Repeat CT chest ordered given persistent infiltrates. Steroid taper.    ROS: No chest pain, palpitations, light headedness, dizziness, headache, nausea/vomiting, fevers/chills, abdominal pain, dysuria or increased urinary frequency.    ICU Vital Signs Last 24 Hrs  T(C): 37.4 (24 Mar 2021 15:59), Max: 37.4 (24 Mar 2021 15:59)  T(F): 99.3 (24 Mar 2021 15:59), Max: 99.3 (24 Mar 2021 15:59)  HR: 84 (24 Mar 2021 15:59) (76 - 96)  BP: 117/78 (24 Mar 2021 15:59) (111/75 - 122/81)  RR: 21 (24 Mar 2021 15:59) (16 - 21)  SpO2: 93% (24 Mar 2021 16:00) (92% - 99%)      MEDICATIONS  (STANDING):  budesonide 160 MICROgram(s)/formoterol 4.5 MICROgram(s) Inhaler 2 Puff(s) Inhalation two times a day  dextrose 40% Gel 15 Gram(s) Oral once  dextrose 5%. 1000 milliLiter(s) (50 mL/Hr) IV Continuous <Continuous>  dextrose 5%. 1000 milliLiter(s) (100 mL/Hr) IV Continuous <Continuous>  dextrose 50% Injectable 25 Gram(s) IV Push once  dextrose 50% Injectable 12.5 Gram(s) IV Push once  dextrose 50% Injectable 25 Gram(s) IV Push once  enoxaparin Injectable 40 milliGRAM(s) SubCutaneous every 12 hours  fluticasone propionate 50 MICROgram(s)/spray Nasal Spray 1 Spray(s) Both Nostrils two times a day  glucagon  Injectable 1 milliGRAM(s) IntraMuscular once  influenza   Vaccine 0.5 milliLiter(s) IntraMuscular once  insulin lispro (ADMELOG) corrective regimen sliding scale   SubCutaneous three times a day before meals  insulin lispro (ADMELOG) corrective regimen sliding scale   SubCutaneous at bedtime  melatonin 5 milliGRAM(s) Oral at bedtime  pantoprazole    Tablet 40 milliGRAM(s) Oral before breakfast  tiotropium 18 MICROgram(s) Capsule 1 Capsule(s) Inhalation daily    MEDICATIONS  (PRN):  acetaminophen   Tablet .. 650 milliGRAM(s) Oral every 6 hours PRN Mild Pain (1 - 3), Moderate Pain (4 - 6)  ALPRAZolam 0.25 milliGRAM(s) Oral three times a day PRN anxiety  benzocaine 15 mG/menthol 3.6 mG (Sugar-Free) Lozenge 1 Lozenge Oral every 6 hours PRN Sore Throat      LABS:                        14.6   14.27 )-----------( 334      ( 24 Mar 2021 07:39 )             46.3     03-23    134<L>  |  93<L>  |  14.0  ----------------------------<  89  4.1   |  29.0  |  0.64    Ca    8.7      23 Mar 2021 05:57  Phos  2.6     03-23  Mg     2.0     03-23      CAPILLARY BLOOD GLUCOSE      POCT Blood Glucose.: 96 mg/dL (24 Mar 2021 18:08)  POCT Blood Glucose.: 120 mg/dL (24 Mar 2021 13:16)  POCT Blood Glucose.: 106 mg/dL (24 Mar 2021 08:13)  POCT Blood Glucose.: 100 mg/dL (23 Mar 2021 20:42)      PHYSICAL EXAM:    GENERAL: middle aged male, sitting in chair, NAD  HEAD:  Atraumatic, Normocephalic  EYES: EOMI, PERRLA, conjunctiva and sclera clear  ENMT: Moist mucous membranes  NECK: Supple  NERVOUS SYSTEM:  Alert & Oriented X3   CHEST/LUNG: bilateral air entry, no wheezing, coarse breath sounds  HEART: Regular rate and rhythm; +S1/S2  ABDOMEN: Soft, Nontender, Nondistended; Bowel sounds present  EXTREMITIES: no pedal edema Unknown if ever smoked

## 2021-03-24 NOTE — PROGRESS NOTE ADULT - ASSESSMENT
55 year old male with a past medical history of hypertension, hyperlipidemia and diabetes mellitus type 2 admitted for acute hypoxic respiratory failure secondary to COVID. Initially was admitted to ICU on BIpap, was weaned off BIpap to [facility] and transferred to Medicine on 3.1 Completed 10 days of IV remdesivir and decadron however remained hypoxia.  CTA imaging and LE dopplers neg for acute thromboembolic disease. Decadron was restarted at 12mg by pulmonary medicine and is being weaned. Patient remains hypoxic; now on 4 liters of NC. CXR with persistent diffuse infiltrates. Repeat CT chest ordered.    1.  Acute hypoxic respiratory failure secondary to COVID pneumonia   -remains hypoxic but O2 requirements improved; remains on 4 liters but remains dyspneic.  -Completed 10 days of IV decadron and remdesivir  -Not a candidate for Actemra per ID  -Started decadron taper today; taper again in AM  -LE Duplex negative for DVT  -Nocturnal bipap  -Incentive spirometry, chest PT and self proning discussed with nursing staff and patient  -Continue bronchodilators  -Prognosis guarded; pulmonary on board and recommendations appreciated  -Repeat CXR today with persistent diffuse infiltrates. Will attempt repeat CTA if patient is able to lay flat.    2. Hypertension  - BP stable off anti-hypertensives    3. Chronic headaches   -denies headaches at this time  -continue tramadol Q8 prn    4.  Prediabetes Hba1c of 6  Monitor sugars on steroids  HbA1c 6.0  Continue ISS  Add lantus if needed    DVT ppx - Lovenox BID (BMI > 30)    Dispo - Remains hypoxic but O2 requirements have improved and have remained stable in the past 24 hours. Patient reports continued labored breathing despite stable pulse ox. Unwilling to participate with PT due to SOB. Repeat CTA ordered if patient is able to lay flat. Continue decadron taper as ordered. Home O2 being arranged by [facility].     Plan discussed with patient, CCM

## 2021-03-24 NOTE — PROGRESS NOTE ADULT - ASSESSMENT
Acute hypoxic respiratory failure- improving  Covid- 19 pneumonia  Post remdesivir, remains on decadron  Now on NCO2  CXR c/w possible pneumomediastinum; CXR without ptx    Rec:    Decrease FiO2 as needed  CXR with persistent infiltrates  Consider CT if needed  Continue self proning  On off steroids  Consider steroid trial if infiltrates persist  On lovenox 40 bid, previous DVT/PE excluded  DNI  Anxiolytics for dyspnea  OOB/ambulate as tolerates

## 2021-03-24 NOTE — PROGRESS NOTE ADULT - SUBJECTIVE AND OBJECTIVE BOX
PULMONARY PROGRESS NOTE      BECKY GUERRA  MRN-[id number]    Patient is a 55y old  Male who presents with a chief complaint of covid hypoxia (23 Mar 2021 18:01)      INTERVAL HPI/OVERNIGHT EVENTS:    Pt is awake and alert  Feels better  FiO2 improved  Down to 3 lpm nc O2    MEDICATIONS  (STANDING):  budesonide 160 MICROgram(s)/formoterol 4.5 MICROgram(s) Inhaler 2 Puff(s) Inhalation two times a day  dextrose 40% Gel 15 Gram(s) Oral once  dextrose 5%. 1000 milliLiter(s) (50 mL/Hr) IV Continuous <Continuous>  dextrose 5%. 1000 milliLiter(s) (100 mL/Hr) IV Continuous <Continuous>  dextrose 50% Injectable 25 Gram(s) IV Push once  dextrose 50% Injectable 12.5 Gram(s) IV Push once  dextrose 50% Injectable 25 Gram(s) IV Push once  enoxaparin Injectable 40 milliGRAM(s) SubCutaneous every 12 hours  fluticasone propionate 50 MICROgram(s)/spray Nasal Spray 1 Spray(s) Both Nostrils two times a day  glucagon  Injectable 1 milliGRAM(s) IntraMuscular once  influenza   Vaccine 0.5 milliLiter(s) IntraMuscular once  insulin lispro (ADMELOG) corrective regimen sliding scale   SubCutaneous three times a day before meals  insulin lispro (ADMELOG) corrective regimen sliding scale   SubCutaneous at bedtime  melatonin 5 milliGRAM(s) Oral at bedtime  pantoprazole    Tablet 40 milliGRAM(s) Oral before breakfast  tiotropium 18 MICROgram(s) Capsule 1 Capsule(s) Inhalation daily      MEDICATIONS  (PRN):  acetaminophen   Tablet .. 650 milliGRAM(s) Oral every 6 hours PRN Mild Pain (1 - 3), Moderate Pain (4 - 6)  ALPRAZolam 0.25 milliGRAM(s) Oral three times a day PRN anxiety  benzocaine 15 mG/menthol 3.6 mG (Sugar-Free) Lozenge 1 Lozenge Oral every 6 hours PRN Sore Throat      Allergies    No Known Allergies    Intolerances        PAST MEDICAL & SURGICAL HISTORY:  Hypertension    No significant past surgical history          REVIEW OF SYSTEMS:    CONSTITUTIONAL:  No distress    HEENT:  Eyes:  No diplopia or blurred vision. ENT:  No earache, sore throat or runny nose.    CARDIOVASCULAR:  No pressure, squeezing, tightness, heaviness or aching about the chest; no palpitations.    RESPIRATORY:  Improved cough, shortness of breath, no PND or orthopnea. Mild SOBOE    GASTROINTESTINAL:  No nausea, vomiting or diarrhea.    GENITOURINARY:  No dysuria, frequency or urgency.    NEUROLOGIC:  No paresthesias, fasciculations, seizures or weakness.    PSYCHIATRIC:  No disorder of thought or mood.    Vital Signs Last 24 Hrs  T(C): 36.9 (24 Mar 2021 08:08), Max: 36.9 (24 Mar 2021 08:08)  T(F): 98.4 (24 Mar 2021 08:08), Max: 98.4 (24 Mar 2021 08:08)  HR: 76 (24 Mar 2021 08:08) (76 - 96)  BP: 122/81 (24 Mar 2021 08:08) (111/75 - 122/81)  BP(mean): --  RR: 16 (24 Mar 2021 08:08) (16 - 20)  SpO2: 95% (24 Mar 2021 08:08) (92% - 99%)    PHYSICAL EXAMINATION:    GENERAL: The patient is awake and alert in no apparent distress.     HEENT: Head is normocephalic and atraumatic. Extraocular muscles are intact. Mucous membranes are moist.    NECK: Supple.    LUNGS: Clear to auscultation without wheezing, rales or rhonchi; respirations unlabored    HEART: Regular rate and rhythm without murmur.    ABDOMEN: Soft, nontender, and nondistended.      EXTREMITIES: Without any cyanosis, clubbing, rash, lesions or edema.    NEUROLOGIC: Grossly intact.    LABS:                        14.6   14.27 )-----------( 334      ( 24 Mar 2021 07:39 )             46.3     03-23    134<L>  |  93<L>  |  14.0  ----------------------------<  89  4.1   |  29.0  |  0.64    Ca    8.7      23 Mar 2021 05:57  Phos  2.6     03-23  Mg     2.0     03-23      MICROBIOLOGY:    COVID-19 PCR . (02.27.21 @ 22:15)    COVID-19 PCR: Detected: TYPE:(C=Critical, N=Notification, A=Abnormal) C  TESTS: COVID-19 PCR  DATE/TIME CALLED: 02/28/2021 00:20:59 EST  CALLED TO: RN: BRITTON  READ BACK (2 Patient Identifiers)(Y/N): Y  READ BACK VALUES (Y/N): Y  CALLED BY: AB  You can help in the fight against COVID-19. [facility] may contact  you to see if you are interested in voluntarily participating in one of  our clinical trials.  Testing is performed using polymerase chain reaction (PCR) or  transcription mediated amplification (TMA). This COVID-19 (SARS-CoV-2)  nucleic acid amplification test was validated by [facility] and is  in use under the FDA Emergency Use Authorization (EUA) for clinical labs  CLIA-certified to perform high complexity testing. Test results should be  correlated with clinical presentation, patient history, and epidemiology.        RADIOLOGY & ADDITIONAL STUDIES:     EXAM:  XR CHEST PORTABLE ROUTINE 1V                          PROCEDURE DATE:  03/24/2021          INTERPRETATION:  CLINICAL HISTORY: f/u covid 19. Admitting Dxs: U07.1 COVID-19    TECHNIQUE: Single portable AP view of the chest.    COMPARISON: 3/22/2021.    FINDINGS:  Diffuse bilateral airspace opacities again noted. Cannot rule out small bilateral pleural effusions. No sizable pneumothorax identified.    Cardiomediastinal silhouette is stable.    Right upper quadrant cholecystectomy clips noted.    Osseous structures are unremarkable.    IMPRESSION:  Diffuse bilateral airspace opacities again noted.            LUZMARIA DIAZ M.D., ATTENDING RADIOLOGIST  This document has been electronically signed. Mar 24 2021  1:25PM      ECHO:

## 2021-03-25 DIAGNOSIS — J98.2 INTERSTITIAL EMPHYSEMA: ICD-10-CM

## 2021-03-25 DIAGNOSIS — U07.1 COVID-19: ICD-10-CM

## 2021-03-25 LAB
GLUCOSE BLDC GLUCOMTR-MCNC: 100 MG/DL — HIGH (ref 70–99)
GLUCOSE BLDC GLUCOMTR-MCNC: 108 MG/DL — HIGH (ref 70–99)
GLUCOSE BLDC GLUCOMTR-MCNC: 110 MG/DL — HIGH (ref 70–99)
GLUCOSE BLDC GLUCOMTR-MCNC: 114 MG/DL — HIGH (ref 70–99)

## 2021-03-25 PROCEDURE — 99233 SBSQ HOSP IP/OBS HIGH 50: CPT

## 2021-03-25 PROCEDURE — 99223 1ST HOSP IP/OBS HIGH 75: CPT

## 2021-03-25 PROCEDURE — 71275 CT ANGIOGRAPHY CHEST: CPT | Mod: 26

## 2021-03-25 RX ADMIN — Medication 1 SPRAY(S): at 06:04

## 2021-03-25 RX ADMIN — BUDESONIDE AND FORMOTEROL FUMARATE DIHYDRATE 2 PUFF(S): 160; 4.5 AEROSOL RESPIRATORY (INHALATION) at 21:35

## 2021-03-25 RX ADMIN — Medication 1 SPRAY(S): at 17:31

## 2021-03-25 RX ADMIN — TIOTROPIUM BROMIDE 1 CAPSULE(S): 18 CAPSULE ORAL; RESPIRATORY (INHALATION) at 08:28

## 2021-03-25 RX ADMIN — ENOXAPARIN SODIUM 40 MILLIGRAM(S): 100 INJECTION SUBCUTANEOUS at 06:04

## 2021-03-25 RX ADMIN — Medication 650 MILLIGRAM(S): at 13:55

## 2021-03-25 RX ADMIN — BUDESONIDE AND FORMOTEROL FUMARATE DIHYDRATE 2 PUFF(S): 160; 4.5 AEROSOL RESPIRATORY (INHALATION) at 08:28

## 2021-03-25 RX ADMIN — Medication 650 MILLIGRAM(S): at 12:55

## 2021-03-25 RX ADMIN — Medication 5 MILLIGRAM(S): at 21:34

## 2021-03-25 RX ADMIN — PANTOPRAZOLE SODIUM 40 MILLIGRAM(S): 20 TABLET, DELAYED RELEASE ORAL at 06:04

## 2021-03-25 RX ADMIN — ENOXAPARIN SODIUM 40 MILLIGRAM(S): 100 INJECTION SUBCUTANEOUS at 17:34

## 2021-03-25 NOTE — CONSULT NOTE ADULT - PROBLEM SELECTOR RECOMMENDATION 2
Continue NC  Avoid positive pressure ventilation if possible  Pt is DNI? But would recommend intubation if necessary for life saving measures

## 2021-03-25 NOTE — PROGRESS NOTE ADULT - ASSESSMENT
Acute hypoxic respiratory failure- improving  Covid- 19 pneumonia  Post remdesivir, remains on decadron  Now on NCO2  CT with pneumomediastinum but no PTX    Rec:    Decrease FiO2 as needed  CXR with persistent infiltrates  Taper steroids slowly  Continue self proning  On lovenox 40 bid, previous DVT/PE excluded  DNI  Anxiolytics for dyspnea  OOB/ambulate as tolerates  SHERIN  Consider thoracic surgery opinion for pneumomediastinum though unlikely to require intervention  Eventual PFTs and pulm f/u after d/c    d/w medicine previously

## 2021-03-25 NOTE — PROGRESS NOTE ADULT - SUBJECTIVE AND OBJECTIVE BOX
PULMONARY PROGRESS NOTE      BECKY GUERRA  MRN-[id number]    Patient is a 55y old  Male who presents with a chief complaint of covid hypoxia (24 Mar 2021 18:53)      INTERVAL HPI/OVERNIGHT EVENTS:    Pt awake and alert  Feels better  Decreased FiO2 requirement    MEDICATIONS  (STANDING):  budesonide 160 MICROgram(s)/formoterol 4.5 MICROgram(s) Inhaler 2 Puff(s) Inhalation two times a day  dexAMETHasone  Injectable 6 milliGRAM(s) IV Push once  dextrose 40% Gel 15 Gram(s) Oral once  dextrose 5%. 1000 milliLiter(s) (50 mL/Hr) IV Continuous <Continuous>  dextrose 5%. 1000 milliLiter(s) (100 mL/Hr) IV Continuous <Continuous>  dextrose 50% Injectable 25 Gram(s) IV Push once  dextrose 50% Injectable 12.5 Gram(s) IV Push once  dextrose 50% Injectable 25 Gram(s) IV Push once  enoxaparin Injectable 40 milliGRAM(s) SubCutaneous every 12 hours  fluticasone propionate 50 MICROgram(s)/spray Nasal Spray 1 Spray(s) Both Nostrils two times a day  glucagon  Injectable 1 milliGRAM(s) IntraMuscular once  influenza   Vaccine 0.5 milliLiter(s) IntraMuscular once  insulin lispro (ADMELOG) corrective regimen sliding scale   SubCutaneous three times a day before meals  insulin lispro (ADMELOG) corrective regimen sliding scale   SubCutaneous at bedtime  melatonin 5 milliGRAM(s) Oral at bedtime  pantoprazole    Tablet 40 milliGRAM(s) Oral before breakfast  tiotropium 18 MICROgram(s) Capsule 1 Capsule(s) Inhalation daily      MEDICATIONS  (PRN):  acetaminophen   Tablet .. 650 milliGRAM(s) Oral every 6 hours PRN Mild Pain (1 - 3), Moderate Pain (4 - 6)  ALPRAZolam 0.25 milliGRAM(s) Oral three times a day PRN anxiety  benzocaine 15 mG/menthol 3.6 mG (Sugar-Free) Lozenge 1 Lozenge Oral every 6 hours PRN Sore Throat      Allergies    No Known Allergies    Intolerances        PAST MEDICAL & SURGICAL HISTORY:  Hypertension    No significant past surgical history          REVIEW OF SYSTEMS:    CONSTITUTIONAL:  No distress    HEENT:  Eyes:  No diplopia or blurred vision. ENT:  No earache, sore throat or runny nose.    CARDIOVASCULAR:  No pressure, squeezing, tightness, heaviness or aching about the chest; no palpitations.    RESPIRATORY:  Improved cough, shortness of breath, no PND or orthopnea. Mild SOBOE    GASTROINTESTINAL:  No nausea, vomiting or diarrhea.    GENITOURINARY:  No dysuria, frequency or urgency.    NEUROLOGIC:  No paresthesias, fasciculations, seizures or weakness.    PSYCHIATRIC:  No disorder of thought or mood.    Vital Signs Last 24 Hrs  T(C): 36.8 (25 Mar 2021 08:08), Max: 37.4 (24 Mar 2021 15:59)  T(F): 98.2 (25 Mar 2021 08:08), Max: 99.3 (24 Mar 2021 15:59)  HR: 83 (25 Mar 2021 08:08) (79 - 84)  BP: 109/74 (25 Mar 2021 08:08) (109/74 - 130/85)  BP(mean): --  RR: 20 (25 Mar 2021 08:08) (20 - 21)  SpO2: 95% (25 Mar 2021 08:08) (92% - 95%)    PHYSICAL EXAMINATION:    GENERAL: The patient is awake and alert in no apparent distress.     HEENT: Head is normocephalic and atraumatic. Extraocular muscles are intact. Mucous membranes are moist.    NECK: Supple.    LUNGS: Clear to auscultation without wheezing, rales or rhonchi; respirations unlabored    HEART: Regular rate and rhythm without murmur.    ABDOMEN: Soft, nontender, and nondistended.      EXTREMITIES: Without any cyanosis, clubbing, rash, lesions or edema.    NEUROLOGIC: Grossly intact.    LABS:                        14.6   14.27 )-----------( 334      ( 24 Mar 2021 07:39 )             46.3       MICROBIOLOGY:    COVID-19 PCR . (02.27.21 @ 22:15)    COVID-19 PCR: Detected: TYPE:(C=Critical, N=Notification, A=Abnormal) C  TESTS: COVID-19 PCR  DATE/TIME CALLED: 02/28/2021 00:20:59 EST  CALLED TO: RN: BRITTON  READ BACK (2 Patient Identifiers)(Y/N): Y  READ BACK VALUES (Y/N): Y  CALLED BY: AB  You can help in the fight against COVID-19. [facility] may contact  you to see if you are interested in voluntarily participating in one of  our clinical trials.  Testing is performed using polymerase chain reaction (PCR) or  transcription mediated amplification (TMA). This COVID-19 (SARS-CoV-2)  nucleic acid amplification test was validated by [employer] and is  in use under the FDA Emergency Use Authorization (EUA) for clinical labs  CLIA-certified to perform high complexity testing. Test results should be  correlated with clinical presentation, patient history, and epidemiology.        RADIOLOGY & ADDITIONAL STUDIES:     EXAM:  XR CHEST PORTABLE ROUTINE 1V                          PROCEDURE DATE:  03/24/2021          INTERPRETATION:  CLINICAL HISTORY: f/u covid 19. Admitting Dxs: U07.1 COVID-19    TECHNIQUE: Single portable AP view of the chest.    COMPARISON: 3/22/2021.    FINDINGS:  Diffuse bilateral airspace opacities again noted. Cannot rule out small bilateral pleural effusions. No sizable pneumothorax identified.    Cardiomediastinal silhouette is stable.    Right upper quadrant cholecystectomy clips noted.    Osseous structures are unremarkable.    IMPRESSION:  Diffuse bilateral airspace opacities again noted.        LUZMARIA DIAZ M.D., ATTENDING RADIOLOGIST  This document has been electronically signed. Mar 24 2021  1:25PM       EXAM:  CT ANGIO CHEST (W)AW IC                          PROCEDURE DATE:  03/25/2021          INTERPRETATION:  FINAL REPORT:    CLINICAL INFORMATION: Shortness of breath. Persistent diffuse infiltrates.    COMPARISON: CT chest 3/5/2021 and chest x-ray 3/24/2021    CONTRAST/COMPLICATIONS:  IV Contrast: Omnipaque 350  53 cc administered   47 cc discarded  Oral Contrast: NONE  Complications: None reported at time of study completion    PROCEDURE:  CT Angiography of the Chest.  Sagittal and coronalreformats were performed as well as 3D (MIP) reconstructions.    FINDINGS:    LUNGS AND AIRWAYS: Patent central airways.  Diffuse bilateral groundglass opacities in the lungs, more dense in the upper lobes and overall decrease in density since 3/5/2021. Mild diffuse bronchiectasis.  PLEURA: No pleural effusion.  MEDIASTINUM AND MARISSA: Moderate to large amount of pneumomediastinum, new since 3/5/2021. Air extends into the lower neck adjacent to the thyroid. The majority of the pneumomediastinum is located anteriorly adjacent to the heart. Air is also present within the posterior aspect of the mediastinum and in the retrocrural space. Stable mediastinal lymph nodes.  VESSELS: No pulmonary embolism. Thoracic aorta normal in caliber.  HEART: Heartsize is normal. No pericardial effusion.  CHEST WALL AND LOWER NECK: No enlarged axillary lymph nodes.  VISUALIZED UPPER ABDOMEN: Cholecystectomy.  BONES: No aggressive osseous lesion.    IMPRESSION:  No pulmonary embolism.    Diffuse bilateral groundglass opacities, worse in the upper lobes, overall decreased in density since 3/5/2021; differential includes COVID pneumonia or ARDS.    Moderate to large amount of pneumomediastinum, new since 3/5/2021 with a small amount of air tracking into the lower neck.    A preliminary report was provided. The final report was discussed with Dr. Perez at 9:00 AM on 3/25/2021.        OMA GLORIA MD; Attending Radiologist  This document has been electronically signed. Mar 25 2021  9:08AM

## 2021-03-25 NOTE — CONSULT NOTE ADULT - SUBJECTIVE AND OBJECTIVE BOX
History of Present Illness:  HPI:  Patient is a 55 year old male with a pmhx of HTN who presents with shortness of breath. Patient explains his symptoms began 2/22 with cough, kervin, myalgias, nausea, and SOB. Then went to urgent care and was found to be covid + on 2/22. Since then symptoms have gotten worse. Then came to Ed for severe shortness of breath. EMS found him with sats of 50s on room air. Then upon arrival he was placed on bipap for increase work of breathing. Sats improved but RR did not.. MICU consult placed. Pt seen and examined at bedside with Ed  and at the moment patient is without medical complaints. He explains that he feels "fine" (27 Feb 2021 23:51)     Initially was admitted to ICU on BIpap, was weaned off BIpap to [place] NC and transferred to Medicine on 3.1 Completed 10 days of IV remdesivir and decadron however remained hypoxic.  CTA imaging and LE dopplers neg for acute thromboembolic disease. Decadron was restarted at 12mg by pulmonary medicine and is being weaned. Patient remains hypoxic; now on 4 liters of NC after weeks on hi flow NC. CXR with persistent diffuse infiltrates. Repeat CT chest ordered which revealed significant pneumomediastinum and pneumopericardium.    Current Subjective Assessment: Pt seen and examined at the bedside with  Delmis via video chat. Pt in NAD denies cp, sob at rest although reports significant increase in SOB with minimal exertion (associated also with significant hypoxemia). Pt also reports mild intermittent headache and feeling warm. Pt denies chest pain, palpitations, dizziness, abdominal pain or N/V/D/C.     Past Medical History  Hypertension    No pertinent past medical history        Past Surgical History  No significant past surgical history        MEDICATIONS  (STANDING):  budesonide 160 MICROgram(s)/formoterol 4.5 MICROgram(s) Inhaler 2 Puff(s) Inhalation two times a day  dexAMETHasone  Injectable 6 milliGRAM(s) IV Push once  dextrose 40% Gel 15 Gram(s) Oral once  dextrose 5%. 1000 milliLiter(s) (50 mL/Hr) IV Continuous <Continuous>  dextrose 5%. 1000 milliLiter(s) (100 mL/Hr) IV Continuous <Continuous>  dextrose 50% Injectable 25 Gram(s) IV Push once  dextrose 50% Injectable 12.5 Gram(s) IV Push once  dextrose 50% Injectable 25 Gram(s) IV Push once  enoxaparin Injectable 40 milliGRAM(s) SubCutaneous every 12 hours  fluticasone propionate 50 MICROgram(s)/spray Nasal Spray 1 Spray(s) Both Nostrils two times a day  glucagon  Injectable 1 milliGRAM(s) IntraMuscular once  influenza   Vaccine 0.5 milliLiter(s) IntraMuscular once  insulin lispro (ADMELOG) corrective regimen sliding scale   SubCutaneous three times a day before meals  insulin lispro (ADMELOG) corrective regimen sliding scale   SubCutaneous at bedtime  melatonin 5 milliGRAM(s) Oral at bedtime  pantoprazole    Tablet 40 milliGRAM(s) Oral before breakfast  tiotropium 18 MICROgram(s) Capsule 1 Capsule(s) Inhalation daily    MEDICATIONS  (PRN):  acetaminophen   Tablet .. 650 milliGRAM(s) Oral every 6 hours PRN Mild Pain (1 - 3), Moderate Pain (4 - 6)  ALPRAZolam 0.25 milliGRAM(s) Oral three times a day PRN anxiety  benzocaine 15 mG/menthol 3.6 mG (Sugar-Free) Lozenge 1 Lozenge Oral every 6 hours PRN Sore Throat    Antiplatelet therapy:                           Last dose/amt:    Allergies: No Known Allergies      SOCIAL HISTORY:  Smoker: [ ] Yes  [ x] No        PACK YEARS:                         WHEN QUIT?  ETOH use: [ ] Yes  [x ] No              FREQUENCY / QUANTITY:  Ilicit Drug use:  [ ] Yes  [ x] No  Occupation: out of work  Live with: wife  Assist device use: none    PMD: Nissa Zabala    Relevant Family History  FAMILY HISTORY:  no pertinent family history per the patient    Review of Systems  GENERAL:  Fevers[] chills[] sweats[] fatigue[] weight loss[] weight gain []                                      [ x] NEGATIVE  NEURO:  parathesias[] seizures []  syncope []  confusion []                                                                [x ] NEGATIVE                 EYES: glasses[]  blurry vision[]  discharge[] pain[] glaucoma []                                                           [ x] NEGATIVE                 ENMT:  difficulty hearing []  vertigo[]  dysphagia[] epistaxis[] recent dental work []                     [x ] NEGATIVE                 CV:  chest pain[] palpitations[] ALDRICH [x] diaphoresis [] edema[]                                                           [ ] NEGATIVE                                 RESPIRATORY:  wheezing[] SOB[x] cough [mild occasional] sputum[] hemoptysis[]                                                   [ ] NEGATIVE               GI:  nausea[]  vomiting []  diarrhea[] constipation [] melena []                                                          [x ] NEGATIVE            : hematuria[ ]  dysuria[ ] urgency[] incontinence[]                                                                          [ x] NEGATIVE                    MUSKULOSKELETAL:  arthritis[ ]  joint swelling [ ] muscle weakness [ ]                                            [x ] NEGATIVE                     SKIN/BREAST:  rash[ ] itching [ ]  hair loss[ ] masses[ ]                                                                          [x ] NEGATIVE                     PSYCH:  dementia [ ] depression [ ] anxiety[ ]                                                                                          [ x] NEGATIVE                        HEME/LYMPH:  bruises easily[ ] enlarged lymph nodes[ ] tender lymph nodes[ ]                           [x ] NEGATIVE                      ENDOCRINE:  cold intolerance[ ] heat intolerance[ ] polydipsia[ ]                                                      [x ] NEGATIVE                        PHYSICAL EXAM  Vital Signs Last 24 Hrs  T(C): 36.5 (25 Mar 2021 15:58), Max: 37.1 (24 Mar 2021 21:19)  T(F): 97.7 (25 Mar 2021 15:58), Max: 98.8 (24 Mar 2021 21:19)  HR: 80 (25 Mar 2021 15:58) (79 - 83)  BP: 116/77 (25 Mar 2021 15:58) (109/74 - 130/85)  BP(mean): --  RR: 18 (25 Mar 2021 15:58) (18 - 20)  SpO2: 95% (25 Mar 2021 16:00) (92% - 98%)    General: Well nourished, well developed, no acute distress.                                                         Neuro: Normal exam oriented to person/place & time with no focal motor or sensory  deficits.                    Eyes: Normal exam of conjunctiva & lids, pupils equally reactive.   ENT: Normal exam of nasal/oral mucosa with absence of cyanosis.   Neck: Normal exam of jugular veins, trachea & thyroid.   Chest: Normal lung exam with good air movement absence of wheezes, rales, or rhonchi:          no subcutaneous emphysema noted                                                                CV:  Auscultation: normal [s ] S3[ ] S4[ ] Irregular [ ] Rub[ ] Clicks[ ]  Murmurs none:[x ]systolic [ ]  diastolic [ ] holosystolic [ ]  Carotids: No Bruits[s ] Other____________ Abdominal Aorta: normal [ x] nonpalpable[ ]                                                                         GI: Normal exam of abdomen, liver & spleen with no noted masses or tenderness.                                                                                              Extremities: Normal no evidence of cyanosis or deformity Edema: none[s ]trace[ ]1+[ ]2+[ ]3+[ ]4+[ ]  Lower Extremity Pulses: Right[+] Left[+ ]Varicosities[ ]  SKIN : Normal exam to inspection & palpation.                                                           LABS:                        14.6   14.27 )-----------( 334      ( 24 Mar 2021 07:39 )             46.3         < from: CT Angio Chest w/ IV Cont (03.25.21 @ 01:48) >    FINDINGS:    LUNGS AND AIRWAYS: Patent central airways.  Diffuse bilateral groundglass opacities in the lungs, more dense in the upper lobes and overall decrease in density since 3/5/2021. Mild diffuse bronchiectasis.  PLEURA: No pleural effusion.  MEDIASTINUM AND MARISSA: Moderate to large amount of pneumomediastinum, new since 3/5/2021. Air extends into the lower neck adjacent to the thyroid. The majority of the pneumomediastinum is located anteriorly adjacent to the heart. Air is also present within the posterior aspect of the mediastinum and in the retrocrural space. Stable mediastinal lymph nodes.  VESSELS: No pulmonary embolism. Thoracic aorta normal in caliber.  HEART: Heartsize is normal. No pericardial effusion.  CHEST WALL AND LOWER NECK: No enlarged axillary lymph nodes.  VISUALIZED UPPER ABDOMEN: Cholecystectomy.  BONES: No aggressive osseous lesion.    IMPRESSION:  No pulmonary embolism.    Diffuse bilateral groundglass opacities, worse in the upper lobes, overall decreased in density since 3/5/2021; differential includes COVID pneumonia or ARDS.    Moderate to large amount of pneumomediastinum, new since 3/5/2021 with a small amount of air tracking into the lower neck.    A preliminary report was provided. The final report was discussed with Dr. Perez at 9:00 AM on 3/25/2021.            < end of copied text >

## 2021-03-25 NOTE — CONSULT NOTE ADULT - PROBLEM SELECTOR PROBLEM 2
Acute respiratory failure due to severe acute respiratory syndrome coronavirus 2 (SARS-CoV-2) infection
Acute respiratory distress syndrome (ARDS) due to COVID-19 virus
Acute respiratory failure, unspecified whether with hypoxia or hypercapnia

## 2021-03-25 NOTE — CONSULT NOTE ADULT - ASSESSMENT
56y/o male    1- acute hypoxic respiratory failure  2- covid- 19 pneumonia  3- possible sleep disorder  4- HTN     - remdesivir   - dexamethasone  - high flow   - agree with CTPA  - PPI  - saline nasal spray   - symbicort two inhalations bid   albuterol mdi prn     thank you kindly will follow  Please  feel free to reach out with any questions or suggestions    LAUREN ALLEN    PULMONARY and CRITICAL CARE     [phone]    
55y  Male with h/o HTN. Patient presented with shortness of breath. His symptoms began 2/22 with cough, subjective fevers, myalgias, nausea, and SOB. He went to urgent care and was found to be covid + on 2/22. Since his symptoms have gotten worse. He called EMS jose alfredo worsening SOB. EMS found him with sats of 50s on room air, brought him to the ED and was placed on BIPAP for increase work of breathing. O2 Sats improved but RR did not. Patint was admitted to MICU for COVID 19 PNA and respiratory failure.      Acute Hypoxic respiratory failure  COVID-19 infection  B/L Pneumonia   cough  fevers  shortness of breath      - COVID 19 PCR positive   - Continue supportive care measures  - continue to trend inflammatory markers.   - trend CBC with diff, CMP,  CRP, Ferritin,  procalcitonin q 48hours  - Avoid antibiotics unless there is a concern for a bacterial infection  - May consider vitamin C, thiamine and zinc (note lack of evidence to support benefit with COVID 19)  - Start Remdesivir 200mg IV x1 followed by 100mg IV daily   - Dexamethasone 6mg Daily   - follow up all outstanding cultures  - Trend Fever  - Trend Leukocytosis    Will follow  
55 YOM with HTN with acute hypoxic respiratory failure secondary to COVID-19 PNA
55 year old male with PMHx significant for HTN with acute hypoxic respiratory failure secondary to COVID-19 PNA. At this time pt does not require ICU level care, please re-consult if any significant changes in symptoms.  
55M h/o HTN p/w COVID PNA on 2/22 with significant hypoxemia which required weeks of bipap and then hi flow NC. He has recently been reduced to 4 L NC but still has significant desaturations with minimal exertion. CT chest obtained last night which revealed moderate to large pneumomediastinum including pneumopericardium. Pt denies aggressive coughing or retching. Thoracic surgery consulted for evaluation.

## 2021-03-25 NOTE — PROGRESS NOTE ADULT - SUBJECTIVE AND OBJECTIVE BOX
CHIEF COMPLAINT/INTERVAL HISTORY:    Patient is a 55y old  Male who presents with a chief complaint of covid hypoxia (25 Mar 2021 14:42)      HPI:  Patient is a 55 year old male with a pmhx of HTN who presents with shortness of breath. Patient explains his symptoms began 2/22 with cough, kervin, myalgias, nausea, and SOB. Then went to urgent care and was found to be covid + on 2/22. Since then symptoms have gotten worse. Then came to Ed for severe shortness of breath. EMS found him with sats of 50s on room air. Then upon arrival he was placed on bipap for increase work of breathing. Sats improved but RR did not.. MICU consult placed. Pt seen and examined at bedside with Ed  and at the moment patient is without medical complaints. He explains that he feels "fine" (27 Feb 2021 23:51)      SUBJECTIVE & OBJECTIVE: Pt seen and examined at bedside.     ICU Vital Signs Last 24 Hrs  T(C): 36.8 (25 Mar 2021 08:08), Max: 37.1 (24 Mar 2021 21:19)  T(F): 98.2 (25 Mar 2021 08:08), Max: 98.8 (24 Mar 2021 21:19)  HR: 83 (25 Mar 2021 08:08) (79 - 83)  BP: 109/74 (25 Mar 2021 08:08) (109/74 - 130/85)  BP(mean): --  ABP: --  ABP(mean): --  RR: 20 (25 Mar 2021 08:08) (20 - 20)  SpO2: 95% (25 Mar 2021 08:08) (92% - 95%)        MEDICATIONS  (STANDING):  budesonide 160 MICROgram(s)/formoterol 4.5 MICROgram(s) Inhaler 2 Puff(s) Inhalation two times a day  dexAMETHasone  Injectable 6 milliGRAM(s) IV Push once  dextrose 40% Gel 15 Gram(s) Oral once  dextrose 5%. 1000 milliLiter(s) (50 mL/Hr) IV Continuous <Continuous>  dextrose 5%. 1000 milliLiter(s) (100 mL/Hr) IV Continuous <Continuous>  dextrose 50% Injectable 25 Gram(s) IV Push once  dextrose 50% Injectable 12.5 Gram(s) IV Push once  dextrose 50% Injectable 25 Gram(s) IV Push once  enoxaparin Injectable 40 milliGRAM(s) SubCutaneous every 12 hours  fluticasone propionate 50 MICROgram(s)/spray Nasal Spray 1 Spray(s) Both Nostrils two times a day  glucagon  Injectable 1 milliGRAM(s) IntraMuscular once  influenza   Vaccine 0.5 milliLiter(s) IntraMuscular once  insulin lispro (ADMELOG) corrective regimen sliding scale   SubCutaneous three times a day before meals  insulin lispro (ADMELOG) corrective regimen sliding scale   SubCutaneous at bedtime  melatonin 5 milliGRAM(s) Oral at bedtime  pantoprazole    Tablet 40 milliGRAM(s) Oral before breakfast  tiotropium 18 MICROgram(s) Capsule 1 Capsule(s) Inhalation daily    MEDICATIONS  (PRN):  acetaminophen   Tablet .. 650 milliGRAM(s) Oral every 6 hours PRN Mild Pain (1 - 3), Moderate Pain (4 - 6)  ALPRAZolam 0.25 milliGRAM(s) Oral three times a day PRN anxiety  benzocaine 15 mG/menthol 3.6 mG (Sugar-Free) Lozenge 1 Lozenge Oral every 6 hours PRN Sore Throat      LABS:                        14.6   14.27 )-----------( 334      ( 24 Mar 2021 07:39 )             46.3                 CAPILLARY BLOOD GLUCOSE      POCT Blood Glucose.: 108 mg/dL (25 Mar 2021 11:37)  POCT Blood Glucose.: 100 mg/dL (25 Mar 2021 08:26)  POCT Blood Glucose.: 78 mg/dL (24 Mar 2021 21:21)  POCT Blood Glucose.: 96 mg/dL (24 Mar 2021 18:08)      RECENT CULTURES:      RADIOLOGY & ADDITIONAL TESTS:      PHYSICAL EXAM:    GENERAL: NAD, well-groomed, well-developed  HEAD:  Atraumatic, Normocephalic  EYES: EOMI, PERRLA, conjunctiva and sclera clear  ENMT: Moist mucous membranes  NECK: Supple, No JVD  NERVOUS SYSTEM:  Alert & Oriented X3, Motor Strength 5/5 B/L upper and lower extremities; DTRs 2+ intact and symmetric  CHEST/LUNG: Clear to auscultation bilaterally; No rales, rhonchi, wheezing, or rubs  HEART: Regular rate and rhythm; No murmurs, rubs, or gallops  ABDOMEN: Soft, Nontender, Nondistended; Bowel sounds present  EXTREMITIES:  2+ Peripheral Pulses, No clubbing, cyanosis, or edema        DVT/GI ppx  Discussed with pt @ bedside CHIEF COMPLAINT/INTERVAL HISTORY:    Patient is a 55y old  Male who presents with a chief complaint of covid hypoxia (25 Mar 2021 14:42)    SUBJECTIVE & OBJECTIVE: Pt seen and examined at bedside. No overnight events. Patient remains on NC; but acutely desaturates upon ambulation (pulse ox 60s upon ambulation). Repeat CTA with pneumomediastinum and pneumopercardium. CT surgery consulted.    ROS: No chest pain, palpitations, SOB, light headedness, dizziness, headache, nausea/vomiting, fevers/chills, abdominal pain, dysuria or increased urinary frequency.    ICU Vital Signs Last 24 Hrs  T(C): 36.8 (25 Mar 2021 08:08), Max: 37.1 (24 Mar 2021 21:19)  T(F): 98.2 (25 Mar 2021 08:08), Max: 98.8 (24 Mar 2021 21:19)  HR: 83 (25 Mar 2021 08:08) (79 - 83)  BP: 109/74 (25 Mar 2021 08:08) (109/74 - 130/85)  RR: 20 (25 Mar 2021 08:08) (20 - 20)  SpO2: 95% (25 Mar 2021 08:08) (92% - 95%)      MEDICATIONS  (STANDING):  budesonide 160 MICROgram(s)/formoterol 4.5 MICROgram(s) Inhaler 2 Puff(s) Inhalation two times a day  dexAMETHasone  Injectable 6 milliGRAM(s) IV Push once  dextrose 40% Gel 15 Gram(s) Oral once  dextrose 5%. 1000 milliLiter(s) (50 mL/Hr) IV Continuous <Continuous>  dextrose 5%. 1000 milliLiter(s) (100 mL/Hr) IV Continuous <Continuous>  dextrose 50% Injectable 25 Gram(s) IV Push once  dextrose 50% Injectable 12.5 Gram(s) IV Push once  dextrose 50% Injectable 25 Gram(s) IV Push once  enoxaparin Injectable 40 milliGRAM(s) SubCutaneous every 12 hours  fluticasone propionate 50 MICROgram(s)/spray Nasal Spray 1 Spray(s) Both Nostrils two times a day  glucagon  Injectable 1 milliGRAM(s) IntraMuscular once  influenza   Vaccine 0.5 milliLiter(s) IntraMuscular once  insulin lispro (ADMELOG) corrective regimen sliding scale   SubCutaneous three times a day before meals  insulin lispro (ADMELOG) corrective regimen sliding scale   SubCutaneous at bedtime  melatonin 5 milliGRAM(s) Oral at bedtime  pantoprazole    Tablet 40 milliGRAM(s) Oral before breakfast  tiotropium 18 MICROgram(s) Capsule 1 Capsule(s) Inhalation daily    MEDICATIONS  (PRN):  acetaminophen   Tablet .. 650 milliGRAM(s) Oral every 6 hours PRN Mild Pain (1 - 3), Moderate Pain (4 - 6)  ALPRAZolam 0.25 milliGRAM(s) Oral three times a day PRN anxiety  benzocaine 15 mG/menthol 3.6 mG (Sugar-Free) Lozenge 1 Lozenge Oral every 6 hours PRN Sore Throat      LABS:                        14.6   14.27 )-----------( 334      ( 24 Mar 2021 07:39 )             46.3                 CAPILLARY BLOOD GLUCOSE      POCT Blood Glucose.: 108 mg/dL (25 Mar 2021 11:37)  POCT Blood Glucose.: 100 mg/dL (25 Mar 2021 08:26)  POCT Blood Glucose.: 78 mg/dL (24 Mar 2021 21:21)  POCT Blood Glucose.: 96 mg/dL (24 Mar 2021 18:08)      PHYSICAL EXAM:    GENERAL: middle aged male, sitting in chair, NAD  HEAD:  Atraumatic, Normocephalic  EYES: EOMI, PERRLA, conjunctiva and sclera clear  ENMT: Moist mucous membranes  NECK: Supple  NERVOUS SYSTEM:  Alert & Oriented X3   CHEST/LUNG: bilateral air entry, no wheezing, coarse breath sounds  HEART: Regular rate and rhythm; +S1/S2  ABDOMEN: Soft, Nontender, Nondistended; Bowel sounds present  EXTREMITIES: no pedal edema

## 2021-03-25 NOTE — PROGRESS NOTE ADULT - ASSESSMENT
55 year old male with a past medical history of hypertension, hyperlipidemia and diabetes mellitus type 2 admitted for acute hypoxic respiratory failure secondary to COVID. Initially was admitted to ICU on BIpap, was weaned off BIpap to Hiflo NC and transferred to Medicine on 3.1 Completed 10 days of IV remdesivir and decadron however remained hypoxia.  CTA imaging and LE dopplers neg for acute thromboembolic disease. Decadron was restarted at 12mg by pulmonary medicine and is being weaned. Patient remains hypoxic; now on 4 liters of NC. CXR with persistent diffuse infiltrates. Repeat CT chest ordered.    1.  Acute hypoxic respiratory failure secondary to COVID pneumonia   -remains hypoxic but O2 requirements improved overall; down to 3 liters today  -pulse ox at rest acceptable, however patient severely dyspneic upon ambulation with pulse ox of 60s  -Completed 10 days of IV decadron and remdesivir  -Patient was not candidate for Actemra per ID  -Continue Decadron Taper; 6 mg IV daily today  -LE Duplex negative for DVT  -Incentive spirometry, chest PT and self proning discussed with nursing staff and patient  -Continue bronchodilators  -Repeat CTA with slightly improved bilateral denisities but pneumomediastinum and pneumopericardium. Denies chest pain and hemodynamically stable.  -Discussed with pulm; pneumomediastinum seen on previous CXRs  -CT surgery consulted  -Prognosis guarded; pulmonary on board and recommendations appreciated    2. Hypertension  - BP stable off anti-hypertensives    3. Chronic headaches   -denies headaches at this time  -continue tramadol Q8 prn    4.  Prediabetes Hba1c of 6  sugars acceptable  HbA1c 6.0  Continue ISS     5. Hyponatremia  -likely due to low solute intake  -check urine studies  -judicious hydration x 500 cc  -monitor I/Os  -repeat labs in AM    DVT ppx - Lovenox BID (BMI > 30)    Dispo - Remains hypoxic but O2 requirements have improved at rest; desaturates when ambulating. CTA with pneumomediastinum and pneumopericardium CT surgery consulted.     Plan discussed with patient, Dr. Alfred, CT surgery PA, CCM, RN

## 2021-03-25 NOTE — CONSULT NOTE ADULT - PROBLEM SELECTOR RECOMMENDATION 9
Monitor with CXR  Repeat CT scan if reports CP or becomes acutely hypoxic at rest  No surgical indications at this time  D/W Dr Jones
on remdesivir  on steroids  ID following
Complete 10 days remdesivir and steroids to end 3/9/21  Continue to follow ID recommendations

## 2021-03-26 LAB
ANION GAP SERPL CALC-SCNC: 9 MMOL/L — SIGNIFICANT CHANGE UP (ref 5–17)
BASOPHILS # BLD AUTO: 0.06 K/UL — SIGNIFICANT CHANGE UP (ref 0–0.2)
BASOPHILS NFR BLD AUTO: 0.5 % — SIGNIFICANT CHANGE UP (ref 0–2)
BUN SERPL-MCNC: 13 MG/DL — SIGNIFICANT CHANGE UP (ref 8–20)
CALCIUM SERPL-MCNC: 9 MG/DL — SIGNIFICANT CHANGE UP (ref 8.6–10.2)
CHLORIDE SERPL-SCNC: 93 MMOL/L — LOW (ref 98–107)
CO2 SERPL-SCNC: 29 MMOL/L — SIGNIFICANT CHANGE UP (ref 22–29)
CREAT SERPL-MCNC: 0.61 MG/DL — SIGNIFICANT CHANGE UP (ref 0.5–1.3)
EOSINOPHIL # BLD AUTO: 0.52 K/UL — HIGH (ref 0–0.5)
EOSINOPHIL NFR BLD AUTO: 4.2 % — SIGNIFICANT CHANGE UP (ref 0–6)
GLUCOSE BLDC GLUCOMTR-MCNC: 105 MG/DL — HIGH (ref 70–99)
GLUCOSE BLDC GLUCOMTR-MCNC: 163 MG/DL — HIGH (ref 70–99)
GLUCOSE BLDC GLUCOMTR-MCNC: 93 MG/DL — SIGNIFICANT CHANGE UP (ref 70–99)
GLUCOSE BLDC GLUCOMTR-MCNC: 95 MG/DL — SIGNIFICANT CHANGE UP (ref 70–99)
GLUCOSE SERPL-MCNC: 86 MG/DL — SIGNIFICANT CHANGE UP (ref 70–99)
HCT VFR BLD CALC: 44.3 % — SIGNIFICANT CHANGE UP (ref 39–50)
HGB BLD-MCNC: 14.8 G/DL — SIGNIFICANT CHANGE UP (ref 13–17)
IMM GRANULOCYTES NFR BLD AUTO: 1 % — SIGNIFICANT CHANGE UP (ref 0–1.5)
LYMPHOCYTES # BLD AUTO: 1.94 K/UL — SIGNIFICANT CHANGE UP (ref 1–3.3)
LYMPHOCYTES # BLD AUTO: 15.8 % — SIGNIFICANT CHANGE UP (ref 13–44)
MAGNESIUM SERPL-MCNC: 2 MG/DL — SIGNIFICANT CHANGE UP (ref 1.8–2.6)
MCHC RBC-ENTMCNC: 29.4 PG — SIGNIFICANT CHANGE UP (ref 27–34)
MCHC RBC-ENTMCNC: 33.4 GM/DL — SIGNIFICANT CHANGE UP (ref 32–36)
MCV RBC AUTO: 88.1 FL — SIGNIFICANT CHANGE UP (ref 80–100)
MONOCYTES # BLD AUTO: 0.93 K/UL — HIGH (ref 0–0.9)
MONOCYTES NFR BLD AUTO: 7.6 % — SIGNIFICANT CHANGE UP (ref 2–14)
NEUTROPHILS # BLD AUTO: 8.67 K/UL — HIGH (ref 1.8–7.4)
NEUTROPHILS NFR BLD AUTO: 70.9 % — SIGNIFICANT CHANGE UP (ref 43–77)
PHOSPHATE SERPL-MCNC: 2.8 MG/DL — SIGNIFICANT CHANGE UP (ref 2.4–4.7)
PLATELET # BLD AUTO: 317 K/UL — SIGNIFICANT CHANGE UP (ref 150–400)
POTASSIUM SERPL-MCNC: 3.9 MMOL/L — SIGNIFICANT CHANGE UP (ref 3.5–5.3)
POTASSIUM SERPL-SCNC: 3.9 MMOL/L — SIGNIFICANT CHANGE UP (ref 3.5–5.3)
RBC # BLD: 5.03 M/UL — SIGNIFICANT CHANGE UP (ref 4.2–5.8)
RBC # FLD: 13.5 % — SIGNIFICANT CHANGE UP (ref 10.3–14.5)
SODIUM SERPL-SCNC: 131 MMOL/L — LOW (ref 135–145)
WBC # BLD: 12.24 K/UL — HIGH (ref 3.8–10.5)
WBC # FLD AUTO: 12.24 K/UL — HIGH (ref 3.8–10.5)

## 2021-03-26 PROCEDURE — 99233 SBSQ HOSP IP/OBS HIGH 50: CPT

## 2021-03-26 RX ORDER — DEXAMETHASONE 0.5 MG/5ML
4 ELIXIR ORAL DAILY
Refills: 0 | Status: DISCONTINUED | OUTPATIENT
Start: 2021-03-27 | End: 2021-03-28

## 2021-03-26 RX ORDER — SODIUM CHLORIDE 9 MG/ML
500 INJECTION INTRAMUSCULAR; INTRAVENOUS; SUBCUTANEOUS
Refills: 0 | Status: COMPLETED | OUTPATIENT
Start: 2021-03-26 | End: 2021-03-26

## 2021-03-26 RX ADMIN — Medication 1 SPRAY(S): at 05:26

## 2021-03-26 RX ADMIN — Medication 1 SPRAY(S): at 17:31

## 2021-03-26 RX ADMIN — Medication 1: at 12:17

## 2021-03-26 RX ADMIN — Medication 0.25 MILLIGRAM(S): at 12:18

## 2021-03-26 RX ADMIN — ENOXAPARIN SODIUM 40 MILLIGRAM(S): 100 INJECTION SUBCUTANEOUS at 17:31

## 2021-03-26 RX ADMIN — BENZOCAINE AND MENTHOL 1 LOZENGE: 5; 1 LIQUID ORAL at 21:12

## 2021-03-26 RX ADMIN — Medication 650 MILLIGRAM(S): at 06:19

## 2021-03-26 RX ADMIN — TIOTROPIUM BROMIDE 1 CAPSULE(S): 18 CAPSULE ORAL; RESPIRATORY (INHALATION) at 08:08

## 2021-03-26 RX ADMIN — Medication 650 MILLIGRAM(S): at 05:27

## 2021-03-26 RX ADMIN — Medication 5 MILLIGRAM(S): at 21:12

## 2021-03-26 RX ADMIN — ENOXAPARIN SODIUM 40 MILLIGRAM(S): 100 INJECTION SUBCUTANEOUS at 05:25

## 2021-03-26 RX ADMIN — BUDESONIDE AND FORMOTEROL FUMARATE DIHYDRATE 2 PUFF(S): 160; 4.5 AEROSOL RESPIRATORY (INHALATION) at 12:18

## 2021-03-26 RX ADMIN — PANTOPRAZOLE SODIUM 40 MILLIGRAM(S): 20 TABLET, DELAYED RELEASE ORAL at 05:26

## 2021-03-26 RX ADMIN — SODIUM CHLORIDE 75 MILLILITER(S): 9 INJECTION INTRAMUSCULAR; INTRAVENOUS; SUBCUTANEOUS at 16:27

## 2021-03-26 RX ADMIN — BUDESONIDE AND FORMOTEROL FUMARATE DIHYDRATE 2 PUFF(S): 160; 4.5 AEROSOL RESPIRATORY (INHALATION) at 21:12

## 2021-03-26 NOTE — PROGRESS NOTE ADULT - NSTELEHEALTH_GEN_ALL_CORE
No
Onset:  tonight  Location/Description:  Pt reports that she went to the bathroom and found that her underwear was 'completely soaked' with a clear fluid.  denies abdominal pain or vaginal bleeding.  Reports good fetal activity.  Precipitating Factors:  pregnancy  Pain Scale (1-10), 10 highest:  Not rated  Associated Symptoms:  As above  LMP: Patient's last menstrual period was 01/15/2019.  EDC:  10/22/2019    Gestational Age:  24w5d    Blood Type: O POSITIVE    OB History:   OB History    Para Term  AB Living   2 0 0 0 1 0   SAB TAB Ectopic Molar Multiple Live Births   0 0 0 0 0 0           Vaginal/C Section:  0/0  Group B Strep (pos or neg):  unknown  History of previous Labor & Delivery:  none  Recent Care: 7/3/19     Pt plans to deliver at [facility].    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  Also triage RN encouraged pt to call 911 if she does not have a ride to ED but, pt was reluctant to call 911 and is considering driving herself.  Triage RN discouraged pt from driving herself.    (High risk pregnancy, diet controlled gestational diabetes.)    Reason for Disposition  • Leakage of fluid from vagina    Protocols used: PREGNANCY - RUPTURE OF [id number]    --[facility] L&D   Parish given report.    FYI page sent to on-call OB doctor.  
No

## 2021-03-26 NOTE — PROGRESS NOTE ADULT - SUBJECTIVE AND OBJECTIVE BOX
PULMONARY PROGRESS NOTE      [id number]    Patient is a 55y old  Male who presents with a chief complaint of covid hypoxia (25 Mar 2021 18:00)      INTERVAL HPI/OVERNIGHT EVENTS:  Comfortable on 3LPM NCO  Desaturates with exercise  No cough  steroinds being tapered    MEDICATIONS  (STANDING):  budesonide 160 MICROgram(s)/formoterol 4.5 MICROgram(s) Inhaler 2 Puff(s) Inhalation two times a day  dexAMETHasone  Injectable 6 milliGRAM(s) IV Push once  dextrose 40% Gel 15 Gram(s) Oral once  dextrose 5%. 1000 milliLiter(s) (50 mL/Hr) IV Continuous <Continuous>  dextrose 5%. 1000 milliLiter(s) (100 mL/Hr) IV Continuous <Continuous>  dextrose 50% Injectable 25 Gram(s) IV Push once  dextrose 50% Injectable 12.5 Gram(s) IV Push once  dextrose 50% Injectable 25 Gram(s) IV Push once  enoxaparin Injectable 40 milliGRAM(s) SubCutaneous every 12 hours  fluticasone propionate 50 MICROgram(s)/spray Nasal Spray 1 Spray(s) Both Nostrils two times a day  glucagon  Injectable 1 milliGRAM(s) IntraMuscular once  influenza   Vaccine 0.5 milliLiter(s) IntraMuscular once  insulin lispro (ADMELOG) corrective regimen sliding scale   SubCutaneous three times a day before meals  insulin lispro (ADMELOG) corrective regimen sliding scale   SubCutaneous at bedtime  melatonin 5 milliGRAM(s) Oral at bedtime  pantoprazole    Tablet 40 milliGRAM(s) Oral before breakfast  sodium chloride 0.9%. 500 milliLiter(s) (75 mL/Hr) IV Continuous <Continuous>  tiotropium 18 MICROgram(s) Capsule 1 Capsule(s) Inhalation daily      MEDICATIONS  (PRN):  acetaminophen   Tablet .. 650 milliGRAM(s) Oral every 6 hours PRN Mild Pain (1 - 3), Moderate Pain (4 - 6)  ALPRAZolam 0.25 milliGRAM(s) Oral three times a day PRN anxiety  benzocaine 15 mG/menthol 3.6 mG (Sugar-Free) Lozenge 1 Lozenge Oral every 6 hours PRN Sore Throat      Allergies    No Known Allergies    Intolerances        PAST MEDICAL & SURGICAL HISTORY:  Hypertension    No significant past surgical history        SOCIAL HISTORY  Smoking History:       REVIEW OF SYSTEMS:    CONSTITUTIONAL:  No distress    HEENT:  Eyes:  No diplopia or blurred vision. ENT:  No earache, sore throat or runny nose.    CARDIOVASCULAR:  No pressure, squeezing, tightness, heaviness or aching about the chest; no palpitations.    RESPIRATORY: above    GASTROINTESTINAL:  No nausea, vomiting or diarrhea.    GENITOURINARY:  No dysuria, frequency or urgency.    NEUROLOGIC:  No paresthesias, fasciculations, seizures or weakness.    Extremities: No cyanosis, clubbing or edema    PSYCHIATRIC:  No disorder of thought or mood.    Vital Signs Last 24 Hrs  T(C): 36.4 (26 Mar 2021 08:12), Max: 36.8 (25 Mar 2021 20:56)  T(F): 97.5 (26 Mar 2021 08:12), Max: 98.3 (25 Mar 2021 20:56)  HR: 74 (26 Mar 2021 08:12) (61 - 85)  BP: 125/81 (26 Mar 2021 08:12) (116/77 - 125/81)  BP(mean): --  RR: 18 (26 Mar 2021 08:12) (18 - 20)  SpO2: 100% (26 Mar 2021 08:12) (92% - 100%)    PHYSICAL EXAMINATION:    GENERAL: The patient is awake and alert in no apparent distress.     HEENT: Head is normocephalic and atraumatic. Extraocular muscles are intact. Mucous membranes are moist.    NECK: Supple.    LUNGS: Clear to auscultation without wheezing, rales or rhonchi; respirations unlabored    HEART: Regular rate and rhythm without murmur.    ABDOMEN: Soft, nontender, and nondistended.      EXTREMITIES: Without any cyanosis, clubbing, rash, lesions or edema.    NEUROLOGIC: Grossly intact.    LABS:                        14.8   12.24 )-----------( 317      ( 26 Mar 2021 06:37 )             44.3     03-26    131<L>  |  93<L>  |  13.0  ----------------------------<  86  3.9   |  29.0  |  0.61    Ca    9.0      26 Mar 2021 06:37  Phos  2.8     03-26  Mg     2.0     03-26                          MICROBIOLOGY:    RADIOLOGY & ADDITIONAL STUDIES:   EXAM:  XR CHEST PORTABLE ROUTINE 1V                          PROCEDURE DATE:  03/24/2021          INTERPRETATION:  CLINICAL HISTORY: f/u covid 19. Admitting Dxs: U07.1 COVID-19    TECHNIQUE: Single portable AP view of the chest.    COMPARISON: 3/22/2021.    FINDINGS:  Diffuse bilateral airspace opacities again noted. Cannot rule out small bilateral pleural effusions. No sizable pneumothorax identified.    Cardiomediastinal silhouette is stable.    Right upper quadrant cholecystectomy clips noted.    Osseous structures are unremarkable.    IMPRESSION:  Diffuse bilateral airspace opacities again noted.        LUZMARIA DIAZ M.D., ATTENDING RADIOLOGIST  This document has been electronically signed. Mar 24 2021  1:25PM       EXAM:  CT ANGIO CHEST (W)AW IC                          PROCEDURE DATE:  03/25/2021          INTERPRETATION:  FINAL REPORT:    CLINICAL INFORMATION: Shortness of breath. Persistent diffuse infiltrates.    COMPARISON: CT chest 3/5/2021 and chest x-ray 3/24/2021    CONTRAST/COMPLICATIONS:  IV Contrast: Omnipaque 350  53 cc administered   47 cc discarded  Oral Contrast: NONE  Complications: None reported at time of study completion    PROCEDURE:  CT Angiography of the Chest.  Sagittal and coronalreformats were performed as well as 3D (MIP) reconstructions.    FINDINGS:    LUNGS AND AIRWAYS: Patent central airways.  Diffuse bilateral groundglass opacities in the lungs, more dense in the upper lobes and overall decrease in density since 3/5/2021. Mild diffuse bronchiectasis.  PLEURA: No pleural effusion.  MEDIASTINUM AND MARISSA: Moderate to large amount of pneumomediastinum, new since 3/5/2021. Air extends into the lower neck adjacent to the thyroid. The majority of the pneumomediastinum is located anteriorly adjacent to the heart. Air is also present within the posterior aspect of the mediastinum and in the retrocrural space. Stable mediastinal lymph nodes.  VESSELS: No pulmonary embolism. Thoracic aorta normal in caliber.  HEART: Heartsize is normal. No pericardial effusion.  CHEST WALL AND LOWER NECK: No enlarged axillary lymph nodes.  VISUALIZED UPPER ABDOMEN: Cholecystectomy.  BONES: No aggressive osseous lesion.    IMPRESSION:  No pulmonary embolism.    Diffuse bilateral groundglass opacities, worse in the upper lobes, overall decreased in density since 3/5/2021; differential includes COVID pneumonia or ARDS.    Moderate to large amount of pneumomediastinum, new since 3/5/2021 with a small amount of air tracking into the lower neck.    A preliminary report was provided. The final report was discussed with Dr. Perez at 9:00 AM on 3/25/2021.        OMA GLORIA MD; Attending Radiologist  This document has been electronically signed. Mar 25 2021  9:08AM

## 2021-03-26 NOTE — PROGRESS NOTE ADULT - ASSESSMENT
55 year old male with a past medical history of hypertension, hyperlipidemia and diabetes mellitus type 2 admitted for acute hypoxic respiratory failure secondary to COVID. Initially was admitted to ICU on BIpap, was weaned off BIpap to Hiflo NC and transferred to Medicine on 3.1 Completed 10 days of IV remdesivir and decadron however remained hypoxia.  CTA imaging and LE dopplers neg for acute thromboembolic disease. Decadron was restarted at 12mg by pulmonary medicine and is being weaned. Patient remains hypoxic; but O2 requirements significantly improved and patient is now on 4 liters of NC. CXR with persistent diffuse infiltrates. Repeat CTA ordered; found to have pneumomediastinum and pneumopercardium- no surgical management. CT surgery signed off. Patient awaiting home O2 arrangements; could not be arranged today.    1.  Acute hypoxic respiratory failure secondary to COVID pneumonia   -remains hypoxic but O2 requirements improved overall; down to 3 liters today (previously on hiflo for almost 3 weeks)  -however, desaturates on ambulation and requires 6 liters (new O2 concentrator was ordered today; pending delivery)  -Completed 10 days of IV decadron and remdesivir. Patient was not candidate for Actemra per ID.  -Restarted on decadron by pulm; slowly being tapered. Can switch to prednisone taper upon discharge.  -LE Duplex negative for DVT  -Incentive spirometry, chest PT and self proning discussed with nursing staff and patient  -Continue bronchodilators  -Repeat CTA with slightly improved bilateral densities but pneumomediastinum and pneumopericardium. Denies chest pain and hemodynamically stable.  -Discussed with pulm; pneumomediastinum seen on previous CXRs  -CT surgery consulted - no surgical intervention  -Discussed with Dr. Cantu - patient can be discharged home with new concentrator that can facilitate 6 liters of O2. CCM on board and arrangements being made.    2. Hypertension  - BP stable off anti-hypertensives    3. Chronic headaches   -denies headaches at this time  -continue tramadol Q8 prn    4.  Prediabetes Hba1c of 6  sugars acceptable  HbA1c 6.0  Continue ISS     5. Hyponatremia  -likely due to low solute intake vs SIADH  -check urine studies to differentiate  -trial of NS ordered  -TSH WNL  -monitor I/Os  -repeat labs in AM    DVT ppx - Lovenox BID (BMI > 30)    Dispo - Remains hypoxic but O2 requirements have improved at rest; desaturates with ambulation, therefore new concentrator ordered and awaiting delivery to patients home. Discharge home on prednisone taper once arrangements have been made. Outpatient follow up with pulmonary for repeat imaging to ensure resolution of pneumomediastinum/pneumopercardium.     Plan discussed with patient (with bedside  Ramirez), Dr. Villeda, CT surgery team, RN, CCM

## 2021-03-26 NOTE — CHART NOTE - NSCHARTNOTEFT_GEN_A_CORE
Acceptance note     54 yo Tamazight speaking male with PMHs of HTN, HLD and DM came with SOB. Admitted for hypoxic respiratory failure and placed on BIPAP in ICU.   Currently on high flow and being downgraded to step down unit.     ROS at the time of downgrade: SOB.   PE; Sitting on the bed, connected to high flow. Slightly anxious.   Head: normocephalic  Neck: no JVD   Cardio: Regular rate and rhythm   Pulm; b/l rales, no wheezing, on high flow   GI: abdomen is soft, BS (+)   Extr: no edema   Neuro: AOx3, no focal weakness     ICU Vital Signs Last 24 Hrs  T(C): 36.6 (01 Mar 2021 08:39), Max: 37.1 (28 Feb 2021 20:00)  T(F): 97.9 (01 Mar 2021 08:39), Max: 98.7 (28 Feb 2021 20:00)  HR: 53 (01 Mar 2021 08:00) (52 - 82)  BP: 95/52 (01 Mar 2021 09:00) (94/60 - 136/93)  BP(mean): 62 (01 Mar 2021 09:00) (7 - 101)  ABP: --  ABP(mean): --  RR: 31 (01 Mar 2021 09:00) (26 - 40)  SpO2: 91% (01 Mar 2021 09:00) (91% - 98%)      1. Acute hypoxic respiratory failure secondary to covid PNA   Downgrade to step down unit   s/p BIPAP, currently on high flow, saturating 90-93%, taper as tolerated   c/w REmdesivir and Dexamethasone   Incentive spirometry     2. HTN: BP controlled on Lisinopril 20 mg po daily     3. Pre DM: HBA1C 6. BG controlled on Lispro per SS     4. Leukocytosis likely secondary to steroids     5. DVT prophylaxis.
Source: Patient [ ]  Family [ ]   other [ x]  55 year old male with a past medical history of hypertension, hyperlipidemia and diabetes mellitus type 2 admitted for acute hypoxic respiratory failure secondary to COVID. Initially was admitted to ICU on BIpap, was weaned off BIpap to [facility] and transferred to Medicine on 3.1 Completed 10 days of IV remdesivir and decadron however remained hypoxia.  CTA imaging and LE dopplers neg for acute thromboembolic disease. Decadron was restarted at 12mg by pulmonary medicine; however patient remains hypoxic on hi-andrew.    Current Diet: Diet, DASH/TLC:   Sodium & Cholesterol Restricted (02-28-21 @ 14:33)      Patient reports [ ] nausea  [ ] vomiting [ ] diarrhea [ ] constipation  [ ]chewing problems [ ] swallowing issues  [ ] other:     PO intake:  < 50% [ ]   50-75%  [x ]   %  [ ]  other :    Source for PO intake [x ] Patient [ ] family [ ] chart [ ] staff [ ] other         Current Weight:   (3/1) 85 kg   % Weight Change     Pertinent Medications: MEDICATIONS  (STANDING):  budesonide 160 MICROgram(s)/formoterol 4.5 MICROgram(s) Inhaler 2 Puff(s) Inhalation two times a day  dexAMETHasone  IVPB 12 milliGRAM(s) IV Intermittent daily  dextrose 40% Gel 15 Gram(s) Oral once  dextrose 5%. 1000 milliLiter(s) (50 mL/Hr) IV Continuous <Continuous>  dextrose 5%. 1000 milliLiter(s) (100 mL/Hr) IV Continuous <Continuous>  dextrose 50% Injectable 25 Gram(s) IV Push once  dextrose 50% Injectable 12.5 Gram(s) IV Push once  dextrose 50% Injectable 25 Gram(s) IV Push once  enoxaparin Injectable 40 milliGRAM(s) SubCutaneous every 12 hours  fluticasone propionate 50 MICROgram(s)/spray Nasal Spray 1 Spray(s) Both Nostrils two times a day  glucagon  Injectable 1 milliGRAM(s) IntraMuscular once  influenza   Vaccine 0.5 milliLiter(s) IntraMuscular once  insulin lispro (ADMELOG) corrective regimen sliding scale   SubCutaneous three times a day before meals  insulin lispro (ADMELOG) corrective regimen sliding scale   SubCutaneous at bedtime  melatonin 5 milliGRAM(s) Oral at bedtime  pantoprazole    Tablet 40 milliGRAM(s) Oral before breakfast  tiotropium 18 MICROgram(s) Capsule 1 Capsule(s) Inhalation daily    MEDICATIONS  (PRN):  acetaminophen   Tablet .. 650 milliGRAM(s) Oral every 6 hours PRN Mild Pain (1 - 3), Moderate Pain (4 - 6)  ALPRAZolam 0.25 milliGRAM(s) Oral three times a day PRN anxiety  benzocaine 15 mG/menthol 3.6 mG (Sugar-Free) Lozenge 1 Lozenge Oral every 6 hours PRN Sore Throat    Pertinent Labs: CBC Full  -  ( 23 Mar 2021 05:57 )  WBC Count : 14.96 K/uL  RBC Count : 5.12 M/uL  Hemoglobin : 14.5 g/dL  Hematocrit : 45.3 %  Platelet Count - Automated : 329 K/uL  Mean Cell Volume : 88.5 fl  Mean Cell Hemoglobin : 28.3 pg  Mean Cell Hemoglobin Concentration : 32.0 gm/dL  Auto Neutrophil # : 11.23 K/uL  Auto Lymphocyte # : 2.19 K/uL  Auto Monocyte # : 1.11 K/uL  Auto Eosinophil # : 0.28 K/uL  Auto Basophil # : 0.03 K/uL  Auto Neutrophil % : 75.1 %  Auto Lymphocyte % : 14.6 %  Auto Monocyte % : 7.4 %  Auto Eosinophil % : 1.9 %  Auto Basophil % : 0.2 %  03-23 Na134 mmol/L<L> Glu 89 mg/dL K+ 4.1 mmol/L Cr  0.64 mg/dL BUN 14.0 mg/dL Phos 2.6 mg/dL Alb n/a   PAB n/a               Skin: no breakdown noted    Nutrition focused physical exam conducted - found signs of malnutrition [x ]absent [ ]present    Subcutaneous fat loss: [ ] Orbital fat pads region, [ ]Buccal fat region, [ ]Triceps region,  [ ]Ribs region    Muscle wasting: [ ]Temples region, [ ]Clavicle region, [ ]Shoulder region, [ ]Scapula region, [ ]Interosseous region,  [ ]thigh region, [ ]Calf region    Estimated Needs:   [ x] no change since previous assessment  [ ] recalculated:     Current Nutrition Diagnosis: Pt remains at high nutrition risk secondary to increased nutrient needs related to increased physiologic demand for nutrient as evidenced by hypoxic respiratory failure requiring remains hypoxic but O2 requirements improving; down to 4 liters currently. Pt with good PO intake, consuming 100% at meals.     Recommendations:   1) obtain current weight   2) continue diet  3) RX MVI    Monitoring and Evaluation:   [ x] PO intake [x ] Tolerance to diet prescription [X] Weights  [X] Follow up per protocol [X] Labs:
Source: Patient [ ]  Family [ ]   other [x ]  The patient is a 55 year old male with a past medical history of hypertension, hyperlipidemia and diabetes mellitus type 2 admitted for acute hypoxic respiratory failure secondary to COVID. Initially was admitted to ICU on BIpap, was weaned off BIpap to [facility] and transferred to Medicine on 3.1 Completed 10 days of IV remdesivir and decadron however remained hypoxia.  CTA imaging and LE dopplers neg for acute thromboembolic disease. Decadron was restarted at 12mg PO.       Current Diet: Diet, DASH/TLC:   Sodium & Cholesterol Restricted (02-28-21 @ 14:33)        PO intake:  < 50% [ ]   50-75%  [ ]   %  [x ]  other :    Source for PO intake [ ] Patient [ ] family [x ] chart [ ] staff [ ] other    :     Current Weight:   (3/1) 85 kg  (2/28) 76.5 kg  % Weight Change  accuracy of weights questionable    Pertinent Medications: MEDICATIONS  (STANDING):  budesonide 160 MICROgram(s)/formoterol 4.5 MICROgram(s) Inhaler 2 Puff(s) Inhalation two times a day  dexAMETHasone  IVPB 12 milliGRAM(s) IV Intermittent daily  dextrose 40% Gel 15 Gram(s) Oral once  dextrose 5%. 1000 milliLiter(s) (50 mL/Hr) IV Continuous <Continuous>  dextrose 5%. 1000 milliLiter(s) (100 mL/Hr) IV Continuous <Continuous>  dextrose 50% Injectable 25 Gram(s) IV Push once  dextrose 50% Injectable 12.5 Gram(s) IV Push once  dextrose 50% Injectable 25 Gram(s) IV Push once  enoxaparin Injectable 40 milliGRAM(s) SubCutaneous every 12 hours  glucagon  Injectable 1 milliGRAM(s) IntraMuscular once  insulin lispro (ADMELOG) corrective regimen sliding scale   SubCutaneous three times a day before meals  insulin lispro (ADMELOG) corrective regimen sliding scale   SubCutaneous at bedtime  melatonin 5 milliGRAM(s) Oral at bedtime  pantoprazole    Tablet 40 milliGRAM(s) Oral before breakfast  sodium chloride 0.65% Nasal 1 Spray(s) Both Nostrils two times a day  tiotropium 18 MICROgram(s) Capsule 1 Capsule(s) Inhalation daily    MEDICATIONS  (PRN):  acetaminophen   Tablet .. 650 milliGRAM(s) Oral every 6 hours PRN Mild Pain (1 - 3), Moderate Pain (4 - 6)  benzocaine 15 mG/menthol 3.6 mG (Sugar-Free) Lozenge 1 Lozenge Oral every 6 hours PRN Sore Throat  morphine   Solution 1 milliGRAM(s) Oral every 4 hours PRN Severe Pain (7 - 10) OR dyspnea  morphine  - Injectable 1 milliGRAM(s) IV Push every 6 hours PRN SOB  traMADol 50 milliGRAM(s) Oral every 8 hours PRN Moderate Pain (4 - 6)    Pertinent Labs: CBC Full  -  ( 16 Mar 2021 06:16 )  WBC Count : 21.16 K/uL  RBC Count : 4.93 M/uL  Hemoglobin : 14.2 g/dL  Hematocrit : 44.7 %  Platelet Count - Automated : 460 K/uL  Mean Cell Volume : 90.7 fl  Mean Cell Hemoglobin : 28.8 pg  Mean Cell Hemoglobin Concentration : 31.8 gm/dL  Auto Neutrophil # : x  Auto Lymphocyte # : x  Auto Monocyte # : x  Auto Eosinophil # : x  Auto Basophil # : x  Auto Neutrophil % : x  Auto Lymphocyte % : x  Auto Monocyte % : x  Auto Eosinophil % : x  Auto Basophil % : x  03-16 Na133 mmol/L<L> Glu 80 mg/dL K+ 4.1 mmol/L Cr  0.66 mg/dL BUN 18.0 mg/dL Phos n/a   Alb 2.8 g/dL<L> PAB n/a               Skin: intact  no edema documented      Estimated Needs:   [ x] no change since previous assessment  [ ] recalculated:     Current Nutrition Diagnosis: Pt remains at high nutrition risk secondary to increased nutrient needs related to increased physiologic demand for nutrient as evidenced by hypoxic respiratory failure requiring Bipap- now on NC. Pt with good PO intake, consuming 100% at meals. Recommendations below:       Recommendations:   1) continue diet  2) obtain current weight  3)RX MVI, VIT C, VIT D  Monitoring and Evaluation:   [ x] PO intake [x ] Tolerance to diet prescription [X] Weights  [X] Follow up per protocol [X] Labs:
No surgical intervention indicated from thoracic surgery standpoint.  Will sign off.  Please reconsult as necessary.  Discussed with Dr. Jones.
Source: Patient [ ]  Family [ ]   other [x] EMR     Current Diet: Diet, DASH/TLC:   Sodium & Cholesterol Restricted (02-28-21 @ 14:33)    PO intake:  < 50% [ ]   50-75%  [ ]   %  x  other :    Source for PO intake [ ] Patient [ ] family  ] chart [ x] staff [ ] other    Current Weight:   3/1: 85kg  2/28: 76.5kg       % Weight Change: Unsure of accuracy of wt's secondary to inconsistency, will continue to monitor wt's for trends.     Pertinent Medications: MEDICATIONS  (STANDING):  budesonide 160 MICROgram(s)/formoterol 4.5 MICROgram(s) Inhaler 2 Puff(s) Inhalation two times a day  dextrose 40% Gel 15 Gram(s) Oral once  dextrose 5%. 1000 milliLiter(s) (50 mL/Hr) IV Continuous <Continuous>  dextrose 5%. 1000 milliLiter(s) (100 mL/Hr) IV Continuous <Continuous>  dextrose 50% Injectable 25 Gram(s) IV Push once  dextrose 50% Injectable 12.5 Gram(s) IV Push once  dextrose 50% Injectable 25 Gram(s) IV Push once  enoxaparin Injectable 40 milliGRAM(s) SubCutaneous every 12 hours  famotidine Injectable 20 milliGRAM(s) IV Push two times a day  glucagon  Injectable 1 milliGRAM(s) IntraMuscular once  insulin lispro (ADMELOG) corrective regimen sliding scale   SubCutaneous three times a day before meals  insulin lispro (ADMELOG) corrective regimen sliding scale   SubCutaneous at bedtime  remdesivir  IVPB 100 milliGRAM(s) IV Intermittent every 24 hours  sodium chloride 0.65% Nasal 1 Spray(s) Both Nostrils two times a day  tiotropium 18 MICROgram(s) Capsule 1 Capsule(s) Inhalation daily    MEDICATIONS  (PRN):  acetaminophen   Tablet .. 650 milliGRAM(s) Oral every 6 hours PRN Mild Pain (1 - 3), Moderate Pain (4 - 6)    Pertinent Labs: CBC Full  -  ( 08 Mar 2021 05:57 )  WBC Count : 23.21 K/uL  RBC Count : 4.66 M/uL  Hemoglobin : 13.8 g/dL  Hematocrit : 41.9 %  Platelet Count - Automated : 430 K/uL  Mean Cell Volume : 89.9 fl  Mean Cell Hemoglobin : 29.6 pg  Mean Cell Hemoglobin Concentration : 32.9 gm/dL  Auto Neutrophil # : 20.88 K/uL  Auto Lymphocyte # : 1.05 K/uL  Auto Monocyte # : 0.93 K/uL  Auto Eosinophil # : 0.07 K/uL  Auto Basophil # : 0.03 K/uL  Auto Neutrophil % : 90.0 %  Auto Lymphocyte % : 4.5 %  Auto Monocyte % : 4.0 %  Auto Eosinophil % : 0.3 %  Auto Basophil % : 0.1 %    Skin: No breakdown noted.     Estimated Needs:   [x ] no change since previous assessment  [ ] recalculated:     Current Nutrition Diagnosis:  Pt remains at high nutrition risk secondary to increased nutrient needs related to increased physiologic demand for nutrient as evidenced by hypoxic respiratory failure requiring Bipap. Pt with good PO intake, consuming 100% at meals. Recommendations below:     Recommendations:   1. RX: MVI and Vit. D supplementation   2. Check weight daily to monitor trends   3. Monitor Na+, BUN, BGM    Monitoring and Evaluation:   [ ] PO intake [x ] Tolerance to diet prescription [X] Weights  [X] Follow up per protocol [X] Labs:

## 2021-03-26 NOTE — PROGRESS NOTE ADULT - ASSESSMENT
Hypoxic resp failure secondary to COVID 19 resolvid  will need higher FIO2 for exercise  Doing well  Pneumomediastinum stable    Plan  Decrease FiO2 as needed  CXR with persistent infiltrates  Taper steroids slowly  Continue self proning  On lovenox 40 bid, previous DVT/PE excluded  DNI  Anxiolytics for dyspnea  OOB/ambulate as tolerates and wean O2 as needed  SHERIN  Eventual PFTs and pulm f/u after d/c  Please call pulmonary with any issues or re-eval over the weekend. Will f/u on Monday unless called    d/w medicine  Dr Perez

## 2021-03-26 NOTE — PROGRESS NOTE ADULT - SUBJECTIVE AND OBJECTIVE BOX
CHIEF COMPLAINT/INTERVAL HISTORY:   Ramirez    Patient is a 55y old  Male who presents with a chief complaint of covid hypoxia (26 Mar 2021 11:56)    SUBJECTIVE & OBJECTIVE: Pt seen and examined at bedside. No overnight events. Patient able to ambulate with PT today but desaturated to 81% on 4 liters; improved to 92-93% with 6 liters. New O2 tank to be delivered. Hyponatremia noted; IVF.    ROS: No chest pain, palpitations, SOB, light headedness, dizziness, headache, nausea/vomiting, fevers/chills, abdominal pain, dysuria or increased urinary frequency.    ICU Vital Signs Last 24 Hrs  T(C): 36.5 (26 Mar 2021 15:43), Max: 36.8 (25 Mar 2021 20:56)  T(F): 97.7 (26 Mar 2021 15:43), Max: 98.3 (25 Mar 2021 20:56)  HR: 93 (26 Mar 2021 15:43) (61 - 93)  BP: 88/57 (26 Mar 2021 15:43) (88/57 - 125/81)  RR: 18 (26 Mar 2021 15:43) (18 - 20)  SpO2: 93% (26 Mar 2021 15:43) (92% - 100%)    MEDICATIONS  (STANDING):  budesonide 160 MICROgram(s)/formoterol 4.5 MICROgram(s) Inhaler 2 Puff(s) Inhalation two times a day  dexAMETHasone  Injectable 6 milliGRAM(s) IV Push once  dextrose 40% Gel 15 Gram(s) Oral once  dextrose 5%. 1000 milliLiter(s) (50 mL/Hr) IV Continuous <Continuous>  dextrose 5%. 1000 milliLiter(s) (100 mL/Hr) IV Continuous <Continuous>  dextrose 50% Injectable 25 Gram(s) IV Push once  dextrose 50% Injectable 12.5 Gram(s) IV Push once  dextrose 50% Injectable 25 Gram(s) IV Push once  enoxaparin Injectable 40 milliGRAM(s) SubCutaneous every 12 hours  fluticasone propionate 50 MICROgram(s)/spray Nasal Spray 1 Spray(s) Both Nostrils two times a day  glucagon  Injectable 1 milliGRAM(s) IntraMuscular once  influenza   Vaccine 0.5 milliLiter(s) IntraMuscular once  insulin lispro (ADMELOG) corrective regimen sliding scale   SubCutaneous three times a day before meals  insulin lispro (ADMELOG) corrective regimen sliding scale   SubCutaneous at bedtime  melatonin 5 milliGRAM(s) Oral at bedtime  pantoprazole    Tablet 40 milliGRAM(s) Oral before breakfast  tiotropium 18 MICROgram(s) Capsule 1 Capsule(s) Inhalation daily    MEDICATIONS  (PRN):  acetaminophen   Tablet .. 650 milliGRAM(s) Oral every 6 hours PRN Mild Pain (1 - 3), Moderate Pain (4 - 6)  ALPRAZolam 0.25 milliGRAM(s) Oral three times a day PRN anxiety  benzocaine 15 mG/menthol 3.6 mG (Sugar-Free) Lozenge 1 Lozenge Oral every 6 hours PRN Sore Throat      LABS:                        14.8   12.24 )-----------( 317      ( 26 Mar 2021 06:37 )             44.3     03-26    131<L>  |  93<L>  |  13.0  ----------------------------<  86  3.9   |  29.0  |  0.61    Ca    9.0      26 Mar 2021 06:37  Phos  2.8     03-26  Mg     2.0     03-26    CAPILLARY BLOOD GLUCOSE      POCT Blood Glucose.: 93 mg/dL (26 Mar 2021 17:28)  POCT Blood Glucose.: 163 mg/dL (26 Mar 2021 12:15)  POCT Blood Glucose.: 95 mg/dL (26 Mar 2021 08:35)  POCT Blood Glucose.: 114 mg/dL (25 Mar 2021 21:33)      PHYSICAL EXAM:    GENERAL: middle aged male, sitting in chair, NAD  HEAD:  Atraumatic, Normocephalic  EYES: EOMI, PERRLA, conjunctiva and sclera clear  ENMT: Moist mucous membranes  NECK: Supple  NERVOUS SYSTEM:  Alert & Oriented X3   CHEST/LUNG: bilateral air entry, no wheezing, coarse breath sounds  HEART: Regular rate and rhythm; +S1/S2  ABDOMEN: Soft, Nontender, Nondistended; Bowel sounds present  EXTREMITIES: no pedal edema

## 2021-03-27 LAB
ANION GAP SERPL CALC-SCNC: 7 MMOL/L — SIGNIFICANT CHANGE UP (ref 5–17)
BASOPHILS # BLD AUTO: 0.05 K/UL — SIGNIFICANT CHANGE UP (ref 0–0.2)
BASOPHILS NFR BLD AUTO: 0.4 % — SIGNIFICANT CHANGE UP (ref 0–2)
BUN SERPL-MCNC: 16 MG/DL — SIGNIFICANT CHANGE UP (ref 8–20)
CALCIUM SERPL-MCNC: 8.5 MG/DL — LOW (ref 8.6–10.2)
CHLORIDE SERPL-SCNC: 95 MMOL/L — LOW (ref 98–107)
CO2 SERPL-SCNC: 29 MMOL/L — SIGNIFICANT CHANGE UP (ref 22–29)
CREAT SERPL-MCNC: 0.69 MG/DL — SIGNIFICANT CHANGE UP (ref 0.5–1.3)
EOSINOPHIL # BLD AUTO: 0.53 K/UL — HIGH (ref 0–0.5)
EOSINOPHIL NFR BLD AUTO: 4.2 % — SIGNIFICANT CHANGE UP (ref 0–6)
GLUCOSE BLDC GLUCOMTR-MCNC: 108 MG/DL — HIGH (ref 70–99)
GLUCOSE BLDC GLUCOMTR-MCNC: 124 MG/DL — HIGH (ref 70–99)
GLUCOSE BLDC GLUCOMTR-MCNC: 151 MG/DL — HIGH (ref 70–99)
GLUCOSE BLDC GLUCOMTR-MCNC: 98 MG/DL — SIGNIFICANT CHANGE UP (ref 70–99)
GLUCOSE SERPL-MCNC: 92 MG/DL — SIGNIFICANT CHANGE UP (ref 70–99)
HCT VFR BLD CALC: 41.8 % — SIGNIFICANT CHANGE UP (ref 39–50)
HGB BLD-MCNC: 13.8 G/DL — SIGNIFICANT CHANGE UP (ref 13–17)
IMM GRANULOCYTES NFR BLD AUTO: 0.9 % — SIGNIFICANT CHANGE UP (ref 0–1.5)
LYMPHOCYTES # BLD AUTO: 1.72 K/UL — SIGNIFICANT CHANGE UP (ref 1–3.3)
LYMPHOCYTES # BLD AUTO: 13.6 % — SIGNIFICANT CHANGE UP (ref 13–44)
MAGNESIUM SERPL-MCNC: 2.1 MG/DL — SIGNIFICANT CHANGE UP (ref 1.6–2.6)
MCHC RBC-ENTMCNC: 29.4 PG — SIGNIFICANT CHANGE UP (ref 27–34)
MCHC RBC-ENTMCNC: 33 GM/DL — SIGNIFICANT CHANGE UP (ref 32–36)
MCV RBC AUTO: 89.1 FL — SIGNIFICANT CHANGE UP (ref 80–100)
MONOCYTES # BLD AUTO: 0.89 K/UL — SIGNIFICANT CHANGE UP (ref 0–0.9)
MONOCYTES NFR BLD AUTO: 7 % — SIGNIFICANT CHANGE UP (ref 2–14)
NEUTROPHILS # BLD AUTO: 9.38 K/UL — HIGH (ref 1.8–7.4)
NEUTROPHILS NFR BLD AUTO: 73.9 % — SIGNIFICANT CHANGE UP (ref 43–77)
PHOSPHATE SERPL-MCNC: 2.8 MG/DL — SIGNIFICANT CHANGE UP (ref 2.4–4.7)
PLATELET # BLD AUTO: 336 K/UL — SIGNIFICANT CHANGE UP (ref 150–400)
POTASSIUM SERPL-MCNC: 4.4 MMOL/L — SIGNIFICANT CHANGE UP (ref 3.5–5.3)
POTASSIUM SERPL-SCNC: 4.4 MMOL/L — SIGNIFICANT CHANGE UP (ref 3.5–5.3)
RBC # BLD: 4.69 M/UL — SIGNIFICANT CHANGE UP (ref 4.2–5.8)
RBC # FLD: 13.7 % — SIGNIFICANT CHANGE UP (ref 10.3–14.5)
SODIUM SERPL-SCNC: 131 MMOL/L — LOW (ref 135–145)
WBC # BLD: 12.69 K/UL — HIGH (ref 3.8–10.5)
WBC # FLD AUTO: 12.69 K/UL — HIGH (ref 3.8–10.5)

## 2021-03-27 PROCEDURE — 99232 SBSQ HOSP IP/OBS MODERATE 35: CPT

## 2021-03-27 RX ADMIN — BUDESONIDE AND FORMOTEROL FUMARATE DIHYDRATE 2 PUFF(S): 160; 4.5 AEROSOL RESPIRATORY (INHALATION) at 10:08

## 2021-03-27 RX ADMIN — Medication 5 MILLIGRAM(S): at 21:00

## 2021-03-27 RX ADMIN — Medication 650 MILLIGRAM(S): at 22:28

## 2021-03-27 RX ADMIN — Medication 4 MILLIGRAM(S): at 05:40

## 2021-03-27 RX ADMIN — Medication 1: at 17:44

## 2021-03-27 RX ADMIN — BENZOCAINE AND MENTHOL 1 LOZENGE: 5; 1 LIQUID ORAL at 05:40

## 2021-03-27 RX ADMIN — ENOXAPARIN SODIUM 40 MILLIGRAM(S): 100 INJECTION SUBCUTANEOUS at 17:44

## 2021-03-27 RX ADMIN — BUDESONIDE AND FORMOTEROL FUMARATE DIHYDRATE 2 PUFF(S): 160; 4.5 AEROSOL RESPIRATORY (INHALATION) at 20:59

## 2021-03-27 RX ADMIN — Medication 650 MILLIGRAM(S): at 20:58

## 2021-03-27 RX ADMIN — Medication 1 SPRAY(S): at 05:41

## 2021-03-27 RX ADMIN — PANTOPRAZOLE SODIUM 40 MILLIGRAM(S): 20 TABLET, DELAYED RELEASE ORAL at 05:45

## 2021-03-27 RX ADMIN — Medication 0.25 MILLIGRAM(S): at 10:09

## 2021-03-27 RX ADMIN — ENOXAPARIN SODIUM 40 MILLIGRAM(S): 100 INJECTION SUBCUTANEOUS at 05:40

## 2021-03-27 RX ADMIN — TIOTROPIUM BROMIDE 1 CAPSULE(S): 18 CAPSULE ORAL; RESPIRATORY (INHALATION) at 10:08

## 2021-03-27 NOTE — PROGRESS NOTE ADULT - PROVIDER SPECIALTY LIST ADULT
Hospitalist
Infectious Disease
Pulmonology
Hospitalist
Pulmonology
Pulmonology
Critical Care
Hospitalist
Infectious Disease
Pulmonology
Hospitalist
Infectious Disease
Pulmonology
Critical Care

## 2021-03-27 NOTE — PROGRESS NOTE ADULT - SUBJECTIVE AND OBJECTIVE BOX
[facility] Division of Hospital Medicine  Jacob Paul [phone]    Chief Complaint:  Patient is a 55y old  Male who presents with a chief complaint of covid hypoxia (26 Mar 2021 18:06)      SUBJECTIVE / OVERNIGHT EVENTS:  Pt seen and examined at bedside. no complaints.    Patient denies chest pain, SOB, abd pain, N/V, fever, chills, dysuria or any other complaints. All remainder ROS negative.     MEDICATIONS  (STANDING):  budesonide 160 MICROgram(s)/formoterol 4.5 MICROgram(s) Inhaler 2 Puff(s) Inhalation two times a day  dexAMETHasone  Injectable 4 milliGRAM(s) IV Push daily  dexAMETHasone  Injectable 6 milliGRAM(s) IV Push once  dextrose 40% Gel 15 Gram(s) Oral once  dextrose 5%. 1000 milliLiter(s) (50 mL/Hr) IV Continuous <Continuous>  dextrose 5%. 1000 milliLiter(s) (100 mL/Hr) IV Continuous <Continuous>  dextrose 50% Injectable 25 Gram(s) IV Push once  dextrose 50% Injectable 12.5 Gram(s) IV Push once  dextrose 50% Injectable 25 Gram(s) IV Push once  enoxaparin Injectable 40 milliGRAM(s) SubCutaneous every 12 hours  fluticasone propionate 50 MICROgram(s)/spray Nasal Spray 1 Spray(s) Both Nostrils two times a day  glucagon  Injectable 1 milliGRAM(s) IntraMuscular once  influenza   Vaccine 0.5 milliLiter(s) IntraMuscular once  insulin lispro (ADMELOG) corrective regimen sliding scale   SubCutaneous three times a day before meals  insulin lispro (ADMELOG) corrective regimen sliding scale   SubCutaneous at bedtime  melatonin 5 milliGRAM(s) Oral at bedtime  pantoprazole    Tablet 40 milliGRAM(s) Oral before breakfast  tiotropium 18 MICROgram(s) Capsule 1 Capsule(s) Inhalation daily    MEDICATIONS  (PRN):  acetaminophen   Tablet .. 650 milliGRAM(s) Oral every 6 hours PRN Mild Pain (1 - 3), Moderate Pain (4 - 6)  ALPRAZolam 0.25 milliGRAM(s) Oral three times a day PRN anxiety  benzocaine 15 mG/menthol 3.6 mG (Sugar-Free) Lozenge 1 Lozenge Oral every 6 hours PRN Sore Throat        I&O's Summary    26 Mar 2021 07:01  -  27 Mar 2021 07:00  --------------------------------------------------------  IN: 0 mL / OUT: 400 mL / NET: -400 mL        PHYSICAL EXAM:  Vital Signs Last 24 Hrs  T(C): 36.8 (27 Mar 2021 07:48), Max: 36.9 (26 Mar 2021 21:11)  T(F): 98.2 (27 Mar 2021 07:48), Max: 98.4 (26 Mar 2021 21:11)  HR: 73 (27 Mar 2021 07:48) (71 - 93)  BP: 96/60 (27 Mar 2021 07:48) (88/57 - 102/65)  BP(mean): --  RR: 18 (27 Mar 2021 07:48) (18 - 18)  SpO2: 97% (27 Mar 2021 07:48) (93% - 97%)        CONSTITUTIONAL: NAD, well-developed, well-groomed  HEENT: NC/AT, PERRL, no JVD  RESPIRATORY: CTA bilaterally, normal effort  CARDIOVASCULAR: RRR, S1/S2+, no m/g/r  ABDOMEN: Nontender to palpation, normoactive bowel sounds, no rebound/guarding; No hepatosplenomegaly  MUSCLOSKELETAL: No edema, cyanosis or deformities.  PSYCH: A+O to person, place, and time; affect appropriate  NEUROLOGY: CN 2-12 are intact and symmetric; no gross neurological deficits.  SKIN: No rashes; no palpable lesions  VASC: Distal pulses palpable    LABS:                        13.8   12.69 )-----------( 336      ( 27 Mar 2021 07:52 )             41.8     03-27    131<L>  |  95<L>  |  16.0  ----------------------------<  92  4.4   |  29.0  |  0.69    Ca    8.5<L>      27 Mar 2021 07:52  Phos  2.8     03-27  Mg     2.1     03-27                CAPILLARY BLOOD GLUCOSE      POCT Blood Glucose.: 108 mg/dL (27 Mar 2021 12:08)  POCT Blood Glucose.: 98 mg/dL (27 Mar 2021 07:56)  POCT Blood Glucose.: 105 mg/dL (26 Mar 2021 21:09)  POCT Blood Glucose.: 93 mg/dL (26 Mar 2021 17:28)        RADIOLOGY & ADDITIONAL TESTS:  Results Reviewed:   Imaging Personally Reviewed:  Electrocardiogram Personally Reviewed:

## 2021-03-27 NOTE — PROGRESS NOTE ADULT - ASSESSMENT
55 year old male with a past medical history of hypertension, hyperlipidemia and diabetes mellitus type 2 admitted for acute hypoxic respiratory failure secondary to COVID. Initially was admitted to ICU on BIpap, was weaned off BIpap to [place] NC and transferred to Medicine on 3.1 Completed 10 days of IV remdesivir and decadron however remained hypoxia.  CTA imaging and LE dopplers neg for acute thromboembolic disease. Decadron was restarted at 12mg by pulmonary medicine and is being weaned. Patient remains hypoxic; but O2 requirements significantly improved and patient is now on 4 liters of NC. CXR with persistent diffuse infiltrates. Repeat CTA ordered; found to have pneumomediastinum and pneumopercardium- no surgical management. CT surgery signed off. Patient awaiting home O2 arrangements; could not be arranged today.    #Acute hypoxic respiratory failure secondary to COVID pneumonia   -remains hypoxic but O2 requirements improved overall; down to 3 liters   -however, desaturates on ambulation and requires 6 liters (new O2 concentrator was ordered today; pending delivery)  -Completed 10 days of IV decadron and remdesivir. Patient was not candidate for Actemra per ID.  -Restarted on decadron by pulm; slowly being tapered. Can switch to prednisone taper upon discharge.  -LE Duplex negative for DVT  -Incentive spirometry, chest PT and self proning discussed with nursing staff and patient  -Continue bronchodilators  -Repeat CTA with slightly improved bilateral densities but pneumomediastinum and pneumopericardium. Denies chest pain and hemodynamically stable.  -Discussed with pulm; pneumomediastinum seen on previous CXRs  -CT surgery consulted - no surgical intervention  -Prior Hospitalist discussed with Dr. Cantu - patient can be discharged home with new concentrator that can facilitate 6 liters of O2. CCM on board and arrangements being made.    #Hypertension  - BP stable off anti-hypertensives    #Chronic headaches   -denies headaches at this time  -continue tramadol Q8 prn    #Prediabetes   - Hba1c of 6  - Sliding scale     #Hyponatremia  - likely due to low solute intake vs SIADH  - monitor BMP    DVT ppx - Lovenox BID (BMI > 30)    Dispo - Remains hypoxic but O2 requirements have improved at rest; desaturates with ambulation, therefore new concentrator ordered and awaiting delivery to patients home. Discharge home on prednisone taper once arrangements have been made. Outpatient follow up with pulmonary for repeat imaging to ensure resolution of pneumomediastinum/pneumopercardium.

## 2021-03-28 ENCOUNTER — TRANSCRIPTION ENCOUNTER (OUTPATIENT)
Age: 56
End: 2021-03-28

## 2021-03-28 VITALS — OXYGEN SATURATION: 94 %

## 2021-03-28 LAB
GLUCOSE BLDC GLUCOMTR-MCNC: 108 MG/DL — HIGH (ref 70–99)
GLUCOSE BLDC GLUCOMTR-MCNC: 118 MG/DL — HIGH (ref 70–99)

## 2021-03-28 PROCEDURE — 82803 BLOOD GASES ANY COMBINATION: CPT

## 2021-03-28 PROCEDURE — 71045 X-RAY EXAM CHEST 1 VIEW: CPT

## 2021-03-28 PROCEDURE — 87040 BLOOD CULTURE FOR BACTERIA: CPT

## 2021-03-28 PROCEDURE — 83935 ASSAY OF URINE OSMOLALITY: CPT

## 2021-03-28 PROCEDURE — 82565 ASSAY OF CREATININE: CPT

## 2021-03-28 PROCEDURE — 83615 LACTATE (LD) (LDH) ENZYME: CPT

## 2021-03-28 PROCEDURE — 99285 EMERGENCY DEPT VISIT HI MDM: CPT | Mod: 25

## 2021-03-28 PROCEDURE — 80053 COMPREHEN METABOLIC PANEL: CPT

## 2021-03-28 PROCEDURE — 82728 ASSAY OF FERRITIN: CPT

## 2021-03-28 PROCEDURE — 93005 ELECTROCARDIOGRAM TRACING: CPT

## 2021-03-28 PROCEDURE — 87449 NOS EACH ORGANISM AG IA: CPT

## 2021-03-28 PROCEDURE — 86140 C-REACTIVE PROTEIN: CPT

## 2021-03-28 PROCEDURE — 83930 ASSAY OF BLOOD OSMOLALITY: CPT

## 2021-03-28 PROCEDURE — 83735 ASSAY OF MAGNESIUM: CPT

## 2021-03-28 PROCEDURE — 84550 ASSAY OF BLOOD/URIC ACID: CPT

## 2021-03-28 PROCEDURE — 93970 EXTREMITY STUDY: CPT

## 2021-03-28 PROCEDURE — 86480 TB TEST CELL IMMUN MEASURE: CPT

## 2021-03-28 PROCEDURE — 85027 COMPLETE CBC AUTOMATED: CPT

## 2021-03-28 PROCEDURE — 85730 THROMBOPLASTIN TIME PARTIAL: CPT

## 2021-03-28 PROCEDURE — 83520 IMMUNOASSAY QUANT NOS NONAB: CPT

## 2021-03-28 PROCEDURE — 99239 HOSP IP/OBS DSCHRG MGMT >30: CPT

## 2021-03-28 PROCEDURE — 97110 THERAPEUTIC EXERCISES: CPT

## 2021-03-28 PROCEDURE — 84484 ASSAY OF TROPONIN QUANT: CPT

## 2021-03-28 PROCEDURE — 80076 HEPATIC FUNCTION PANEL: CPT

## 2021-03-28 PROCEDURE — 94660 CPAP INITIATION&MGMT: CPT

## 2021-03-28 PROCEDURE — 85610 PROTHROMBIN TIME: CPT

## 2021-03-28 PROCEDURE — 94640 AIRWAY INHALATION TREATMENT: CPT

## 2021-03-28 PROCEDURE — 85379 FIBRIN DEGRADATION QUANT: CPT

## 2021-03-28 PROCEDURE — 96374 THER/PROPH/DIAG INJ IV PUSH: CPT

## 2021-03-28 PROCEDURE — 36415 COLL VENOUS BLD VENIPUNCTURE: CPT

## 2021-03-28 PROCEDURE — 86141 C-REACTIVE PROTEIN HS: CPT

## 2021-03-28 PROCEDURE — 84100 ASSAY OF PHOSPHORUS: CPT

## 2021-03-28 PROCEDURE — 84300 ASSAY OF URINE SODIUM: CPT

## 2021-03-28 PROCEDURE — 97163 PT EVAL HIGH COMPLEX 45 MIN: CPT

## 2021-03-28 PROCEDURE — 84145 PROCALCITONIN (PCT): CPT

## 2021-03-28 PROCEDURE — 82962 GLUCOSE BLOOD TEST: CPT

## 2021-03-28 PROCEDURE — 71275 CT ANGIOGRAPHY CHEST: CPT

## 2021-03-28 PROCEDURE — 97116 GAIT TRAINING THERAPY: CPT

## 2021-03-28 PROCEDURE — 84443 ASSAY THYROID STIM HORMONE: CPT

## 2021-03-28 PROCEDURE — 85025 COMPLETE CBC W/AUTO DIFF WBC: CPT

## 2021-03-28 PROCEDURE — 80048 BASIC METABOLIC PNL TOTAL CA: CPT

## 2021-03-28 RX ORDER — UMECLIDINIUM 62.5 UG/1
1 AEROSOL, POWDER ORAL
Qty: 1 | Refills: 0
Start: 2021-03-28 | End: 2021-04-26

## 2021-03-28 RX ORDER — OMEPRAZOLE 10 MG/1
1 CAPSULE, DELAYED RELEASE ORAL
Qty: 0 | Refills: 0 | DISCHARGE

## 2021-03-28 RX ORDER — BUDESONIDE AND FORMOTEROL FUMARATE DIHYDRATE 160; 4.5 UG/1; UG/1
2 AEROSOL RESPIRATORY (INHALATION)
Qty: 1 | Refills: 0
Start: 2021-03-28 | End: 2021-04-26

## 2021-03-28 RX ORDER — FLUTICASONE FUROATE AND VILANTEROL TRIFENATATE 100; 25 UG/1; UG/1
2 POWDER RESPIRATORY (INHALATION)
Qty: 1 | Refills: 0
Start: 2021-03-28 | End: 2021-04-26

## 2021-03-28 RX ORDER — TIOTROPIUM BROMIDE 18 UG/1
1 CAPSULE ORAL; RESPIRATORY (INHALATION)
Qty: 1 | Refills: 0
Start: 2021-03-28 | End: 2021-04-26

## 2021-03-28 RX ORDER — PANTOPRAZOLE SODIUM 20 MG/1
1 TABLET, DELAYED RELEASE ORAL
Qty: 30 | Refills: 0
Start: 2021-03-28 | End: 2021-04-26

## 2021-03-28 RX ORDER — ASPIRIN/CALCIUM CARB/MAGNESIUM 324 MG
1 TABLET ORAL
Qty: 30 | Refills: 0
Start: 2021-03-28 | End: 2021-04-26

## 2021-03-28 RX ORDER — OMEPRAZOLE 10 MG/1
1 CAPSULE, DELAYED RELEASE ORAL
Qty: 30 | Refills: 0
Start: 2021-03-28 | End: 2021-04-26

## 2021-03-28 RX ADMIN — ENOXAPARIN SODIUM 40 MILLIGRAM(S): 100 INJECTION SUBCUTANEOUS at 05:15

## 2021-03-28 RX ADMIN — PANTOPRAZOLE SODIUM 40 MILLIGRAM(S): 20 TABLET, DELAYED RELEASE ORAL at 05:15

## 2021-03-28 RX ADMIN — BUDESONIDE AND FORMOTEROL FUMARATE DIHYDRATE 2 PUFF(S): 160; 4.5 AEROSOL RESPIRATORY (INHALATION) at 12:10

## 2021-03-28 RX ADMIN — Medication 4 MILLIGRAM(S): at 05:15

## 2021-03-28 RX ADMIN — Medication 1 SPRAY(S): at 05:15

## 2021-03-28 NOTE — DISCHARGE NOTE PROVIDER - NSDCMRMEDTOKEN_GEN_ALL_CORE_FT
.: Commode  aspirin 81 mg oral tablet, chewable: 1 tab(s) chewed once a day   lisinopril 20 mg oral tablet: 1 tab(s) orally once a day  pantoprazole 40 mg oral delayed release tablet: 1 tab(s) orally once a day (before a meal)  predniSONE 10 mg oral tablet: 4 tab(s) daily  x 5 days  3 tab(s) daily x 5 days  2 tab(s) daily x 5 days  1 tab(s) daily x 5 days  Symbicort 160 mcg-4.5 mcg/inh inhalation aerosol: 2 puff(s) inhaled 2 times a day   tiotropium 18 mcg inhalation capsule: 1 cap(s) inhaled once a day

## 2021-03-28 NOTE — DISCHARGE NOTE NURSING/CASE MANAGEMENT/SOCIAL WORK - PATIENT PORTAL LINK FT
You can access the FollowMyHealth Patient Portal offered by [facility] by registering at the following website: [email or web address]. By joining [email or web address] [email or web address] portal, you will also be able to view your health information using other applications (apps) compatible with our system.

## 2021-03-28 NOTE — DISCHARGE NOTE PROVIDER - HOSPITAL COURSE
55 year old male with a past medical history of hypertension, hyperlipidemia and diabetes mellitus type 2 admitted for acute hypoxic respiratory failure secondary to COVID. Initially was admitted to ICU on BIpap, was weaned off BIpap to [place] NC and transferred to Medicine on 3/1. Completed 10 days of IV remdesivir and decadron however remained hypoxic.  CTA imaging and LE dopplers neg for acute thromboembolic disease. Decadron was restarted at 12mg by pulmonary medicine and is being tapered on. Patient remains hypoxic; but O2 requirements significantly improved and patient is now on 4 liters of NC at rest. Requires 6L on ambulation. CXR with persistent diffuse infiltrates. Repeat CTA ordered; found to have pneumomediastinum and pneumopericardium- no surgical management. CT surgery signed off. New home O2 concentrator arranged was arranged by CM. As per Pulmonary, patient stable for discharge with new concentrator, will require follow up with Pulmonary and need repeat imaging to ensure resolution of pneumomediastinum and pneumopericardium. Seen by PT, rec home.    Patient no longer requires inpatient hospitalization and is agreeable w/ discharge plan w/ outpt follow up.    D/c time spent coordinatin mins    Vital Signs Last 24 Hrs  T(F): 98 (28 Mar 2021 08:05), Max: 98.9 (27 Mar 2021 20:13)  HR: 73 (28 Mar 2021 08:05) (70 - 93)  BP: 121/77 (28 Mar 2021 08:05) (113/77 - 121/77)  RR: 18 (28 Mar 2021 08:05) (16 - 18)  SpO2: 94% (28 Mar 2021 09:43) (78% - 97%)    Physical Exam:  Constitutional: Appears stated aged, not- chronically ill-appearing, laying comfortably in bed in NAD  HEENT: NC/AT, PERRL, EOMI, trachea midline, no JVD  Respiratory: CTA bilaterally, symmetrical chest rise  Cardiovascular: RRR, no m/g/r  Gastrointestinal: Soft, NT/ND, BS+  Vascular: 2+ peripheral pulses  Neurological: A/O x 3, no focal neurological deficits  Psych: Fair mood/affect  Musculoskeletal: No edema, cyanosis, deformities. ROM normal  Skin: No obvious rash, lesions. No jaundice.    IMPROVE VTE Individual Risk Assessment    RISK                                                                Points    [  ] Previous VTE                                                  3    [  ] Thrombophilia                                               2    [  ] Lower limb paralysis                                      2        (unable to hold up >15 seconds)      [  ] Current Cancer                                              2         (within 6 months)    [  ] Immobilization > 24 hrs                                1    [  ] ICU/CCU stay > 24 hours                              1    [  ] Age > 60                                                      1    IMPROVE VTE Score ____1_____    IMPROVE Score 0-1: Low Risk, No VTE prophylaxis required for most patients, encourage ambulation.   IMPROVE Score 2-3: At risk, pharmacologic VTE prophylaxis is indicated for most patients (in the absence of a contraindication)  IMPROVE Score > or = 4: High Risk, pharmacologic VTE prophylaxis is indicated for most patients (in the absence of a contraindication)

## 2021-03-28 NOTE — DISCHARGE NOTE PROVIDER - NSDCCPCAREPLAN_GEN_ALL_CORE_FT
PRINCIPAL DISCHARGE DIAGNOSIS  Diagnosis: Acute respiratory failure due to severe acute respiratory syndrome coronavirus 2 (SARS-CoV-2) infection  Assessment and Plan of Treatment: - follow up with Pulmonary, PMD. Take medications as reconciled

## 2021-03-28 NOTE — DISCHARGE NOTE PROVIDER - CARE PROVIDERS DIRECT ADDRESSES
,DirectAddress_Unknown,[email or web address]

## 2021-03-28 NOTE — DISCHARGE NOTE PROVIDER - CARE PROVIDER_API CALL
PMD,   Phone: (   )    -  Fax: (   )    -  Follow Up Time:     Noé Monique (MD)  Critical Care Medicine; Internal Medicine; Pulmonary Disease  [address] [place] [address]  Phone: [phone]  Fax: [phone]  Follow Up Time: 1 week

## 2021-03-28 NOTE — DISCHARGE NOTE PROVIDER - NSDCFUADDINST_GEN_ALL_CORE_FT
- Please take medications as reconciled.  - Please follow up with Pulmonary in 1 week. You will need repeat imaging to ensure resolution of pneumomediastium and pneumopericardium.  - Follow up with your PMD within 1-2 weeks.  - You are currently stable for discharge, however if your condition worsens please seek immediate medical attention.

## 2021-03-28 NOTE — DISCHARGE NOTE PROVIDER - PROVIDER TOKENS
FREE:[LAST:[PMD],PHONE:[(   )    -],FAX:[(   )    -]],PROVIDER:[TOKEN:[[id number]],FOLLOWUP:[1 week]]

## 2021-04-09 PROBLEM — I10 ESSENTIAL (PRIMARY) HYPERTENSION: Chronic | Status: ACTIVE | Noted: 2021-02-28

## 2021-04-15 ENCOUNTER — APPOINTMENT (OUTPATIENT)
Dept: PULMONOLOGY | Facility: CLINIC | Age: 56
End: 2021-04-15
Payer: MEDICAID

## 2021-04-15 VITALS
OXYGEN SATURATION: 99 % | HEIGHT: 63.5 IN | SYSTOLIC BLOOD PRESSURE: 120 MMHG | BODY MASS INDEX: 27.65 KG/M2 | TEMPERATURE: 97.6 F | RESPIRATION RATE: 16 BRPM | WEIGHT: 158 LBS | DIASTOLIC BLOOD PRESSURE: 88 MMHG | HEART RATE: 114 BPM

## 2021-04-15 DIAGNOSIS — Z86.39 PERSONAL HISTORY OF OTHER ENDOCRINE, NUTRITIONAL AND METABOLIC DISEASE: ICD-10-CM

## 2021-04-15 DIAGNOSIS — J98.2 INTERSTITIAL EMPHYSEMA: ICD-10-CM

## 2021-04-15 DIAGNOSIS — Z86.79 PERSONAL HISTORY OF OTHER DISEASES OF THE CIRCULATORY SYSTEM: ICD-10-CM

## 2021-04-15 DIAGNOSIS — Z09 ENCOUNTER FOR FOLLOW-UP EXAMINATION AFTER COMPLETED TREATMENT FOR CONDITIONS OTHER THAN MALIGNANT NEOPLASM: ICD-10-CM

## 2021-04-15 DIAGNOSIS — J96.01 ACUTE RESPIRATORY FAILURE WITH HYPOXIA: ICD-10-CM

## 2021-04-15 PROCEDURE — 99205 OFFICE O/P NEW HI 60 MIN: CPT

## 2021-04-15 PROCEDURE — 99072 ADDL SUPL MATRL&STAF TM PHE: CPT

## 2021-04-15 RX ORDER — LEVALBUTEROL TARTRATE 45 UG/1
45 AEROSOL, METERED ORAL
Qty: 1 | Refills: 5 | Status: ACTIVE | COMMUNITY
Start: 2021-04-15

## 2021-04-15 NOTE — DISCUSSION/SUMMARY
[FreeTextEntry1] : 55-year-old male, seen today status post Covid 19, with severe pneumonitis and complicating pneumomediastinum. Patient doing remarkably well with a significant improvement in oxygenation. He has been revised to continue his steroid taper and discontinue accordingly. He is monitoring his own saturations and has been also instructed to reduce his oxygen or self discontinue if his saturations remained greater than 90% on room air. He will continue to sleep with oxygen at 2 L per minute until he is seen again in this office. Exercise oximetry will be performed then as well as pulmonary functions, and chest CT

## 2021-04-15 NOTE — HISTORY OF PRESENT ILLNESS
[Never] : never [TextBox_4] : 55-year-old male with a history of hypertension, hyperlipidemia, and diabetes. Patient was admitted to [facility] on 2/27 and discharged on 3/28/21. Patient was admitted with hypoxic respiratory failure secondary to Covid 19. He is treated on BiPAP eventually transferring to high flow nasal cannula oxygen. He completed 10 days of IV Remdesivir, and Decadron, only to require a second course of Decadron due to worsening symptoms. He was eventually weaned to 4 L of oxygen nasal cannula at rest and 6. On ambulation and discharged home. Course was complicated by pneumomediastinum with no evidence of pulmonary emboli.\par \par Patient has had a dramatic improvement in oxygenation. He has noted saturation is 95% on room. He has no complaints of cough, wheeze, or sputum. He is completing steroids in 4 days. History is negative for leg edema, paroxysmal nocturnal dyspnea, or orthopnea.

## 2021-04-15 NOTE — REASON FOR VISIT
[Follow-Up - From Hospitalization] : a follow-up visit after a recent hospitalization [TextBox_44] : hypoxic resp failure, Covid 19

## 2021-04-21 ENCOUNTER — APPOINTMENT (OUTPATIENT)
Dept: RHEUMATOLOGY | Facility: CLINIC | Age: 56
End: 2021-04-21
Payer: MEDICAID

## 2021-04-21 VITALS
DIASTOLIC BLOOD PRESSURE: 70 MMHG | SYSTOLIC BLOOD PRESSURE: 126 MMHG | WEIGHT: 158 LBS | BODY MASS INDEX: 27.65 KG/M2 | TEMPERATURE: 97.2 F | OXYGEN SATURATION: 99 % | HEART RATE: 114 BPM | HEIGHT: 63.5 IN | RESPIRATION RATE: 17 BRPM

## 2021-04-21 DIAGNOSIS — J12.82 COVID-19: ICD-10-CM

## 2021-04-21 DIAGNOSIS — U07.1 COVID-19: ICD-10-CM

## 2021-04-21 PROCEDURE — 99214 OFFICE O/P EST MOD 30 MIN: CPT

## 2021-04-21 PROCEDURE — 99072 ADDL SUPL MATRL&STAF TM PHE: CPT

## 2021-04-21 RX ORDER — SIMVASTATIN 80 MG/1
TABLET, FILM COATED ORAL
Refills: 0 | Status: DISCONTINUED | COMMUNITY
End: 2021-04-21

## 2021-04-21 RX ORDER — GABAPENTIN 100 MG/1
CAPSULE ORAL
Refills: 0 | Status: DISCONTINUED | COMMUNITY
End: 2021-04-21

## 2021-04-21 RX ORDER — TOPIRAMATE 50 MG/1
50 TABLET, FILM COATED ORAL
Qty: 30 | Refills: 1 | Status: DISCONTINUED | COMMUNITY
Start: 2018-03-21 | End: 2021-04-21

## 2021-06-23 ENCOUNTER — APPOINTMENT (OUTPATIENT)
Dept: RHEUMATOLOGY | Facility: CLINIC | Age: 56
End: 2021-06-23
Payer: MEDICAID

## 2021-06-23 VITALS
HEART RATE: 102 BPM | SYSTOLIC BLOOD PRESSURE: 122 MMHG | OXYGEN SATURATION: 99 % | HEIGHT: 63.5 IN | DIASTOLIC BLOOD PRESSURE: 84 MMHG | RESPIRATION RATE: 17 BRPM | BODY MASS INDEX: 31.15 KG/M2 | TEMPERATURE: 98 F | WEIGHT: 178 LBS

## 2021-06-23 PROCEDURE — 99214 OFFICE O/P EST MOD 30 MIN: CPT

## 2021-06-23 PROCEDURE — 99072 ADDL SUPL MATRL&STAF TM PHE: CPT

## 2021-06-23 RX ORDER — ALBUTEROL SULFATE 90 UG/1
INHALANT RESPIRATORY (INHALATION)
Refills: 0 | Status: ACTIVE | COMMUNITY

## 2021-07-01 ENCOUNTER — APPOINTMENT (OUTPATIENT)
Dept: CT IMAGING | Facility: CLINIC | Age: 56
End: 2021-07-01
Payer: MEDICAID

## 2021-07-01 ENCOUNTER — OUTPATIENT (OUTPATIENT)
Dept: OUTPATIENT SERVICES | Facility: HOSPITAL | Age: 56
LOS: 1 days | End: 2021-07-01

## 2021-07-01 DIAGNOSIS — J96.01 ACUTE RESPIRATORY FAILURE WITH HYPOXIA: ICD-10-CM

## 2021-07-01 DIAGNOSIS — Z01.818 ENCOUNTER FOR OTHER PREPROCEDURAL EXAMINATION: ICD-10-CM

## 2021-07-01 PROCEDURE — 71250 CT THORAX DX C-: CPT | Mod: 26

## 2021-07-09 ENCOUNTER — APPOINTMENT (OUTPATIENT)
Dept: DISASTER EMERGENCY | Facility: CLINIC | Age: 56
End: 2021-07-09

## 2021-07-09 LAB — SARS-COV-2 N GENE NPH QL NAA+PROBE: NOT DETECTED

## 2021-07-12 ENCOUNTER — APPOINTMENT (OUTPATIENT)
Dept: PULMONOLOGY | Facility: CLINIC | Age: 56
End: 2021-07-12
Payer: MEDICAID

## 2021-07-12 VITALS — OXYGEN SATURATION: 92 % | HEART RATE: 82 BPM | SYSTOLIC BLOOD PRESSURE: 140 MMHG | DIASTOLIC BLOOD PRESSURE: 80 MMHG

## 2021-07-12 VITALS — WEIGHT: 182 LBS | HEIGHT: 62 IN | BODY MASS INDEX: 33.49 KG/M2

## 2021-07-12 PROCEDURE — 99072 ADDL SUPL MATRL&STAF TM PHE: CPT

## 2021-07-12 PROCEDURE — 94729 DIFFUSING CAPACITY: CPT

## 2021-07-12 PROCEDURE — 94010 BREATHING CAPACITY TEST: CPT

## 2021-07-12 PROCEDURE — 94727 GAS DIL/WSHOT DETER LNG VOL: CPT

## 2021-07-12 PROCEDURE — 99205 OFFICE O/P NEW HI 60 MIN: CPT | Mod: 25

## 2021-07-12 PROCEDURE — 85018 HEMOGLOBIN: CPT | Mod: QW

## 2021-07-12 NOTE — DISCUSSION/SUMMARY
[FreeTextEntry1] : 55-year-old male, status post Covid 19  4 months ago, seen today in followup. Near-complete resolution of previously seen infiltrates. Will followup with one last CAT scan in 6 months.\par \par Acute asthmatic exacerbation over past 3 wks

## 2021-07-12 NOTE — HISTORY OF PRESENT ILLNESS
[Never] : never [TextBox_4] : 55-year-old male with a history of hypertension, hyperlipidemia, diabetes, seen today in followup for Covid 19 pneumonitis, for which he was hospitalized for nearly a month at [facility]. Presently, he denies complaints of cough, wheeze, shortness of breath, chest pains, palpitations, lightheadedness, dizziness until last 3 wks. Has noted increased SOB with cough and wheeze. No sputum. Has been only using SABD

## 2021-07-13 ENCOUNTER — RX CHANGE (OUTPATIENT)
Age: 56
End: 2021-07-13

## 2021-07-13 RX ORDER — FLUTICASONE PROPIONATE AND SALMETEROL 500; 50 UG/1; UG/1
500-50 POWDER RESPIRATORY (INHALATION)
Qty: 1 | Refills: 5 | Status: DISCONTINUED | COMMUNITY
Start: 2021-07-12 | End: 2021-07-13

## 2021-08-02 ENCOUNTER — RX CHANGE (OUTPATIENT)
Age: 56
End: 2021-08-02

## 2021-08-02 RX ORDER — PREGABALIN 100 MG/1
100 CAPSULE ORAL
Qty: 90 | Refills: 3 | Status: DISCONTINUED | COMMUNITY
Start: 2021-02-17 | End: 2021-08-02

## 2021-08-06 ENCOUNTER — RX RENEWAL (OUTPATIENT)
Age: 56
End: 2021-08-06

## 2021-08-09 ENCOUNTER — APPOINTMENT (OUTPATIENT)
Dept: DISASTER EMERGENCY | Facility: CLINIC | Age: 56
End: 2021-08-09

## 2021-08-10 LAB — SARS-COV-2 N GENE NPH QL NAA+PROBE: NOT DETECTED

## 2021-08-12 ENCOUNTER — APPOINTMENT (OUTPATIENT)
Dept: PULMONOLOGY | Facility: CLINIC | Age: 56
End: 2021-08-12
Payer: MEDICAID

## 2021-08-12 VITALS
DIASTOLIC BLOOD PRESSURE: 90 MMHG | SYSTOLIC BLOOD PRESSURE: 140 MMHG | RESPIRATION RATE: 16 BRPM | OXYGEN SATURATION: 98 % | HEART RATE: 78 BPM

## 2021-08-12 PROCEDURE — 99215 OFFICE O/P EST HI 40 MIN: CPT

## 2021-08-12 PROCEDURE — 99072 ADDL SUPL MATRL&STAF TM PHE: CPT

## 2021-08-12 RX ORDER — PREDNISONE 10 MG/1
10 TABLET ORAL
Qty: 42 | Refills: 0 | Status: DISCONTINUED | COMMUNITY
Start: 2021-07-12 | End: 2021-08-12

## 2021-08-12 NOTE — CONSULT LETTER
[Dear  ___] : Dear  [unfilled], [Consult Letter:] : I had the pleasure of evaluating your patient, [unfilled]. [Please see my note below.] : Please see my note below. [Consult Closing:] : Thank you very much for allowing me to participate in the care of this patient.  If you have any questions, please do not hesitate to contact me. [Sincerely,] : Sincerely, [FreeTextEntry3] : Noé Monique MD FCCP\par Pulmonary/Critical Care/Sleep Medicine\par Department of Internal Medicine\par \par [facility]\par

## 2021-08-12 NOTE — DISCUSSION/SUMMARY
[FreeTextEntry1] : 55-year-old male, seen today status post Covid 19 nearly 4 months ago. Patient still has residual hypoxemia with exercise. His CAT scan has improved, but not has completely resolved. Plan is to continue current activity and wean oxygen accordingly. Followup CAT scan will be performed in January. No evidence to suggest chronic fibrosis

## 2021-08-12 NOTE — END OF VISIT
[FreeTextEntry3] : pACS review with patient [Time Spent: ___ minutes] : I have spent [unfilled] minutes of time on the encounter.

## 2021-08-12 NOTE — PROCEDURE
[FreeTextEntry1] : exercise oximetry demonstrates significant desaturation with exercise after a normal room air saturation.This returns to normal with supplemental oxygen

## 2021-08-12 NOTE — HISTORY OF PRESENT ILLNESS
[TextBox_4] : 55-year-old male with a history of hypertension, hyperlipidemia, diabetes, seen today in followup for his Covid 19 pneumonitis, which time he suffered severe hypoxic respiratory failure requiring a one month hospitalization. Patient was treated with IV Remdesivir, and Decadron with a second course of Decadron. He has been weaning his oxygen and is currently using oxygen only with activity. He denies any complaints of cough, wheeze, or shortness of breath. He is compliant with his current drug regimen. He has returned to work.

## 2021-09-29 ENCOUNTER — APPOINTMENT (OUTPATIENT)
Dept: RHEUMATOLOGY | Facility: CLINIC | Age: 56
End: 2021-09-29
Payer: MEDICAID

## 2021-09-29 VITALS
DIASTOLIC BLOOD PRESSURE: 105 MMHG | OXYGEN SATURATION: 97 % | SYSTOLIC BLOOD PRESSURE: 150 MMHG | HEART RATE: 83 BPM | RESPIRATION RATE: 17 BRPM | TEMPERATURE: 98.2 F | HEIGHT: 62 IN | BODY MASS INDEX: 33.49 KG/M2 | WEIGHT: 182 LBS

## 2021-09-29 DIAGNOSIS — M19.042 PRIMARY OSTEOARTHRITIS, RIGHT HAND: ICD-10-CM

## 2021-09-29 DIAGNOSIS — M25.50 PAIN IN UNSPECIFIED JOINT: ICD-10-CM

## 2021-09-29 DIAGNOSIS — M25.552 PAIN IN LEFT HIP: ICD-10-CM

## 2021-09-29 DIAGNOSIS — M17.0 BILATERAL PRIMARY OSTEOARTHRITIS OF KNEE: ICD-10-CM

## 2021-09-29 DIAGNOSIS — M79.7 FIBROMYALGIA: ICD-10-CM

## 2021-09-29 DIAGNOSIS — Z51.81 ENCOUNTER FOR THERAPEUTIC DRUG LVL MONITORING: ICD-10-CM

## 2021-09-29 DIAGNOSIS — G89.4 CHRONIC PAIN SYNDROME: ICD-10-CM

## 2021-09-29 DIAGNOSIS — G44.89 OTHER HEADACHE SYNDROME: ICD-10-CM

## 2021-09-29 DIAGNOSIS — M19.041 PRIMARY OSTEOARTHRITIS, RIGHT HAND: ICD-10-CM

## 2021-09-29 DIAGNOSIS — Z79.1 ENCOUNTER FOR THERAPEUTIC DRUG LVL MONITORING: ICD-10-CM

## 2021-09-29 PROCEDURE — 99072 ADDL SUPL MATRL&STAF TM PHE: CPT

## 2021-09-29 PROCEDURE — 99214 OFFICE O/P EST MOD 30 MIN: CPT

## 2021-09-29 RX ORDER — DICLOFENAC SODIUM 75 MG/1
75 TABLET, DELAYED RELEASE ORAL
Qty: 60 | Refills: 1 | Status: DISCONTINUED | COMMUNITY
Start: 2021-02-17 | End: 2021-09-29

## 2021-10-06 PROBLEM — U07.1 PNEUMONIA DUE TO COVID-19 VIRUS: Status: RESOLVED | Noted: 2021-04-21 | Resolved: 2021-04-21

## 2021-12-01 ENCOUNTER — APPOINTMENT (OUTPATIENT)
Dept: RHEUMATOLOGY | Facility: CLINIC | Age: 56
End: 2021-12-01

## 2021-12-21 PROBLEM — M79.7 FIBROMYALGIA: Status: ACTIVE | Noted: 2018-05-18

## 2021-12-21 PROBLEM — M25.552 LEFT HIP PAIN: Status: ACTIVE | Noted: 2021-06-23

## 2021-12-21 PROBLEM — M17.0 PRIMARY OSTEOARTHRITIS OF BOTH KNEES: Status: ACTIVE | Noted: 2020-10-28

## 2021-12-21 PROBLEM — G44.89 HEADACHE SYNDROME: Status: ACTIVE | Noted: 2018-03-21

## 2021-12-21 PROBLEM — Z51.81 ENCOUNTER FOR MONITORING CHRONIC NSAID THERAPY: Status: ACTIVE | Noted: 2021-12-21

## 2021-12-21 PROBLEM — M19.041 PRIMARY OSTEOARTHRITIS OF BOTH HANDS: Status: ACTIVE | Noted: 2020-10-28

## 2021-12-21 PROBLEM — M25.50 ARTHRALGIA OF MULTIPLE JOINTS: Status: ACTIVE | Noted: 2018-03-15

## 2021-12-21 PROBLEM — G89.4 CHRONIC PAIN DISORDER: Status: ACTIVE | Noted: 2018-05-18

## 2021-12-22 ENCOUNTER — RX RENEWAL (OUTPATIENT)
Age: 56
End: 2021-12-22

## 2021-12-22 RX ORDER — CELECOXIB 200 MG/1
200 CAPSULE ORAL TWICE DAILY
Qty: 60 | Refills: 2 | Status: ACTIVE | COMMUNITY
Start: 2021-09-29 | End: 1900-01-01

## 2022-02-25 ENCOUNTER — APPOINTMENT (OUTPATIENT)
Dept: PULMONOLOGY | Facility: CLINIC | Age: 57
End: 2022-02-25
Payer: MEDICAID

## 2022-02-25 VITALS
HEART RATE: 86 BPM | HEIGHT: 63 IN | BODY MASS INDEX: 32.25 KG/M2 | OXYGEN SATURATION: 98 % | WEIGHT: 182 LBS | RESPIRATION RATE: 16 BRPM | DIASTOLIC BLOOD PRESSURE: 84 MMHG | SYSTOLIC BLOOD PRESSURE: 142 MMHG

## 2022-02-25 PROCEDURE — 99072 ADDL SUPL MATRL&STAF TM PHE: CPT

## 2022-02-25 PROCEDURE — 99214 OFFICE O/P EST MOD 30 MIN: CPT

## 2022-02-25 NOTE — DISCUSSION/SUMMARY
[FreeTextEntry1] : 56-year-old male with a history of severe COVID-19 1 year ago seen today for follow-up and a history of asthma.  Recent increase activity suggest decompensated asthma with poor compliance to medical regimen.  Patient did not undergo routine CT follow-up as previously ordered.  Patient be placed on a prednisone taper  and his Breo has been reinstituted

## 2022-02-25 NOTE — HISTORY OF PRESENT ILLNESS
[TextBox_4] : 8/12/2021:\par 55-year-old male with a history of hypertension, hyperlipidemia, diabetes, seen today in followup for his Covid 19 pneumonitis, which time he suffered severe hypoxic respiratory failure requiring a one month hospitalization. Patient was treated with IV Remdesivir, and Decadron with a second course of Decadron. He has been weaning his oxygen and is currently using oxygen only with activity. He denies any complaints of cough, wheeze, or shortness of breath. He is compliant with his current drug regimen. He has returned to work.\par \par 2/25/2022:\par Patient complaining of several months of increased cough.  He did undergo vaccination but suffered another episode of COVID-19 recently without need for hospitalization.  No history of hypoxemia.  He ran out of Breo 1 month ago and has been using his short acting bronchodilator only with relief.  No history of leg edema paroxysmal nocturnal dyspnea or orthopnea.

## 2022-02-25 NOTE — CONSULT LETTER
[Dear  ___] : Dear  [unfilled], [Consult Letter:] : I had the pleasure of evaluating your patient, [unfilled]. [Please see my note below.] : Please see my note below. [Consult Closing:] : Thank you very much for allowing me to participate in the care of this patient.  If you have any questions, please do not hesitate to contact me. [Sincerely,] : Sincerely, [FreeTextEntry3] : Noé Monique MD FCCP\par Pulmonary/Critical Care/Sleep Medicine\par Department of Internal Medicine\par \par [facility]\par

## 2022-02-25 NOTE — REASON FOR VISIT
[Follow-Up] : a follow-up visit [Asthma] : asthma [Ad Hoc ] : provided by an ad hoc  [TextBox_44] : severe COvid 19 [Interpreters_Relationshiptopatient] : Wife [TWNoteComboBox1] : Algerian

## 2022-03-16 ENCOUNTER — APPOINTMENT (OUTPATIENT)
Dept: CT IMAGING | Facility: CLINIC | Age: 57
End: 2022-03-16

## 2022-03-16 ENCOUNTER — RESULT REVIEW (OUTPATIENT)
Age: 57
End: 2022-03-16

## 2022-03-16 ENCOUNTER — OUTPATIENT (OUTPATIENT)
Dept: OUTPATIENT SERVICES | Facility: HOSPITAL | Age: 57
LOS: 1 days | End: 2022-03-16
Payer: COMMERCIAL

## 2022-03-16 DIAGNOSIS — Z00.8 ENCOUNTER FOR OTHER GENERAL EXAMINATION: ICD-10-CM

## 2022-03-16 PROCEDURE — 71250 CT THORAX DX C-: CPT

## 2022-03-18 ENCOUNTER — NON-APPOINTMENT (OUTPATIENT)
Age: 57
End: 2022-03-18

## 2022-03-28 LAB — SARS-COV-2 N GENE NPH QL NAA+PROBE: NOT DETECTED

## 2022-03-29 ENCOUNTER — APPOINTMENT (OUTPATIENT)
Dept: PULMONOLOGY | Facility: CLINIC | Age: 57
End: 2022-03-29
Payer: MEDICAID

## 2022-03-29 VITALS
BODY MASS INDEX: 31.89 KG/M2 | OXYGEN SATURATION: 98 % | SYSTOLIC BLOOD PRESSURE: 142 MMHG | DIASTOLIC BLOOD PRESSURE: 76 MMHG | WEIGHT: 180 LBS | HEART RATE: 79 BPM | RESPIRATION RATE: 16 BRPM | HEIGHT: 63 IN

## 2022-03-29 DIAGNOSIS — U09.9 POST COVID-19 CONDITION, UNSPECIFIED: ICD-10-CM

## 2022-03-29 DIAGNOSIS — U07.1 COVID-19: ICD-10-CM

## 2022-03-29 DIAGNOSIS — J98.8 COVID-19: ICD-10-CM

## 2022-03-29 PROCEDURE — 99215 OFFICE O/P EST HI 40 MIN: CPT | Mod: 25

## 2022-03-29 PROCEDURE — 85018 HEMOGLOBIN: CPT | Mod: QW

## 2022-03-29 PROCEDURE — 94010 BREATHING CAPACITY TEST: CPT

## 2022-03-29 PROCEDURE — 94729 DIFFUSING CAPACITY: CPT

## 2022-03-29 PROCEDURE — 94727 GAS DIL/WSHOT DETER LNG VOL: CPT

## 2022-03-29 PROCEDURE — 99072 ADDL SUPL MATRL&STAF TM PHE: CPT

## 2022-03-29 RX ORDER — PREGABALIN 100 MG/1
100 CAPSULE ORAL
Qty: 90 | Refills: 3 | Status: ACTIVE | COMMUNITY
Start: 2021-08-02

## 2022-03-29 RX ORDER — TOPIRAMATE 50 MG/1
50 TABLET, FILM COATED ORAL
Qty: 30 | Refills: 0 | Status: ACTIVE | COMMUNITY
Start: 2018-07-30

## 2022-03-29 NOTE — REASON FOR VISIT
[Follow-Up] : a follow-up visit [Asthma] : asthma [Interpreters_Relationshiptopatient] : Wife [TWNoteComboBox1] : Russian

## 2022-03-29 NOTE — HISTORY OF PRESENT ILLNESS
[Wheezing] : resolved wheezing [Cough] : improved coughing [Shortness Of Breath] : improved shortness of breath [Chest Tightness Or Heavy Pressure] : denies chest tightness [Feelings Of Weakness On Exertion] : denies reduced exercise tolerance [Adherent] : the patient is adherent with ~his/her~ medication regimen [de-identified] : COVID-19 in 2021 complicated by severe hypoxic respiratory failure with a second episode in February 2022. [de-identified] : Status post Covid vaccine [Never] : never [3  -  Moderate] : 3, moderate

## 2022-03-29 NOTE — DISCUSSION/SUMMARY
[FreeTextEntry1] : 56-year-old male seen today for moderate persistent asthma complicated by COVID-19 x2.  Chest CT consistent with residual fibrosis.  Marked improvement in pulmonary functions.

## 2022-03-29 NOTE — END OF VISIT
[FreeTextEntry3] : Chest CT reviewed with patient and wife on PACS [Time Spent: ___ minutes] : I have spent [unfilled] minutes of time on the encounter.

## 2022-03-29 NOTE — PROCEDURE
[FreeTextEntry1] : 3/29/2022: Pulmonary function test demonstrate a mild degree of restriction without significant airway obstruction.  Normal diffusion capacity.  Marked improvement when compared to previous values of 7/12/2021

## 2022-09-19 LAB — SARS-COV-2 N GENE NPH QL NAA+PROBE: NOT DETECTED

## 2022-09-20 ENCOUNTER — APPOINTMENT (OUTPATIENT)
Dept: PULMONOLOGY | Facility: CLINIC | Age: 57
End: 2022-09-20

## 2022-09-20 VITALS
OXYGEN SATURATION: 98 % | DIASTOLIC BLOOD PRESSURE: 76 MMHG | HEART RATE: 88 BPM | SYSTOLIC BLOOD PRESSURE: 120 MMHG | RESPIRATION RATE: 16 BRPM

## 2022-09-20 PROCEDURE — 85018 HEMOGLOBIN: CPT | Mod: QW

## 2022-09-20 PROCEDURE — 94729 DIFFUSING CAPACITY: CPT

## 2022-09-20 PROCEDURE — 94010 BREATHING CAPACITY TEST: CPT

## 2022-09-20 PROCEDURE — 94727 GAS DIL/WSHOT DETER LNG VOL: CPT

## 2022-09-20 PROCEDURE — 99214 OFFICE O/P EST MOD 30 MIN: CPT | Mod: 25

## 2022-09-20 NOTE — PROCEDURE
[FreeTextEntry1] : 3/29/2022: Pulmonary function test demonstrate a mild degree of restriction without significant airway obstruction.  Normal diffusion capacity.  Marked improvement when compared to previous values of 7/2021.\par \par 9/20/2022: Pulmonary function test demonstrates a very mild degree of restriction versus lower limits of normal with a normal diffusion capacity.  No significant change from previous spirometry of 3/29/2022

## 2022-09-20 NOTE — HISTORY OF PRESENT ILLNESS
[Never] : never [3  -  Moderate] : 3, moderate [Wheezing] : denies wheezing [Cough] : denies coughing [Shortness Of Breath] : denies shortness of breath [Chest Tightness Or Heavy Pressure] : denies chest tightness [Feelings Of Weakness On Exertion] : denies reduced exercise tolerance [Adherent] : the patient is adherent with ~his/her~ medication regimen [Obstructive Sleep Apnea] : obstructive sleep apnea [Awakes Unrefreshed] : awakes unrefreshed [Awakes with Dry Mouth] : awakes with dry mouth [Awakes with Headache] : awakes with headache [Daytime Somnolence] : daytime somnolence [Snoring] : snoring [Witnessed Apneas] : witnessed apneas [TextBox_77] : 2327 [TextBox_79] : 1000 [TextBox_89] : 2-3 [TextBox_93] : Memory loss [TextBox_165] : Had sleep study at another site 2 months ago. Does not have results [ESS] : 18 [None] : None [de-identified] : COVID-19 in 2021 complicated by severe hypoxic respiratory failure with a second episode in February 2022.

## 2022-09-20 NOTE — REASON FOR VISIT
[Follow-Up] : a follow-up visit [Asthma] : asthma [Interpreters_Relationshiptopatient] : Wife [TWNoteComboBox1] : Australian

## 2022-09-20 NOTE — END OF VISIT
[Time Spent: ___ minutes] : I have spent [unfilled] minutes of time on the encounter. [FreeTextEntry3] : Chest CT reviewed with patient and wife on PACS

## 2022-09-20 NOTE — DISCUSSION/SUMMARY
[Obstructive Sleep Apnea] : obstructive sleep apnea [Alcohol Avoidance] : alcohol avoidance [Sedative Avoidance] : sedative avoidance [Weight Loss Program] : weight loss program [FreeTextEntry1] : 57-year-old male seen today for moderate persistent asthma well-controlled on his current drug regimen.  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 [None] : There are no changes in medication management [de-identified] : family to obtain results of HST and send to me

## 2023-01-28 ENCOUNTER — OFFICE (OUTPATIENT)
Dept: URBAN - METROPOLITAN AREA CLINIC 94 | Facility: CLINIC | Age: 58
Setting detail: OPHTHALMOLOGY
End: 2023-01-28
Payer: MEDICAID

## 2023-01-28 DIAGNOSIS — H17.9: ICD-10-CM

## 2023-01-28 DIAGNOSIS — R51.9: ICD-10-CM

## 2023-01-28 DIAGNOSIS — H35.033: ICD-10-CM

## 2023-01-28 DIAGNOSIS — H25.13: ICD-10-CM

## 2023-01-28 PROCEDURE — 99202 OFFICE O/P NEW SF 15 MIN: CPT | Performed by: PHYSICIAN ASSISTANT

## 2023-01-28 ASSESSMENT — KERATOMETRY
OS_K1POWER_DIOPTERS: 44.50
OD_K2POWER_DIOPTERS: 45.25
OS_K2POWER_DIOPTERS: 45.25
OS_AXISANGLE_DEGREES: 096
OD_K1POWER_DIOPTERS: 44.00
OD_AXISANGLE_DEGREES: 077

## 2023-01-28 ASSESSMENT — REFRACTION_MANIFEST
OS_VA1: 20/25
OS_SPHERE: +2.00
OD_AXIS: 105
OS_CYLINDER: -0.50
OD_SPHERE: +2.50
OS_AXIS: 080
OD_VA1: 20/25
OD_CYLINDER: -0.50

## 2023-01-28 ASSESSMENT — VISUAL ACUITY
OD_BCVA: 20/60-1
OS_BCVA: 20/200

## 2023-01-28 ASSESSMENT — AXIALLENGTH_DERIVED
OS_AL: 22.4619
OD_AL: 22.3689
OS_AL: 22.4619
OD_AL: 22.3689

## 2023-01-28 ASSESSMENT — REFRACTION_AUTOREFRACTION
OS_AXIS: 082
OD_AXIS: 104
OS_CYLINDER: -0.50
OD_CYLINDER: -0.50
OD_SPHERE: +2.50
OS_SPHERE: +2.00

## 2023-01-28 ASSESSMENT — CONFRONTATIONAL VISUAL FIELD TEST (CVF)
OS_FINDINGS: FULL
OD_FINDINGS: FULL

## 2023-01-28 ASSESSMENT — SPHEQUIV_DERIVED
OD_SPHEQUIV: 2.25
OD_SPHEQUIV: 2.25
OS_SPHEQUIV: 1.75
OS_SPHEQUIV: 1.75

## 2023-01-28 ASSESSMENT — TONOMETRY
OS_IOP_MMHG: 11
OD_IOP_MMHG: 11

## 2023-01-28 ASSESSMENT — SUPERFICIAL PUNCTATE KERATITIS (SPK)
OS_SPK: 1+
OD_SPK: 1+

## 2023-01-28 ASSESSMENT — CORNEAL SURGICAL SCARRING: OS_SCARRING: STROMAL

## 2023-02-06 ENCOUNTER — OFFICE (OUTPATIENT)
Dept: URBAN - METROPOLITAN AREA CLINIC 116 | Facility: CLINIC | Age: 58
Setting detail: OPHTHALMOLOGY
End: 2023-02-06
Payer: COMMERCIAL

## 2023-02-06 DIAGNOSIS — Z01.00: ICD-10-CM

## 2023-02-06 PROBLEM — R51.9 HEADACHE, UNSPECIFIED: Status: ACTIVE | Noted: 2023-01-28

## 2023-02-06 PROBLEM — H25.13 CATARACT SENILE NUCLEAR SCLEROSIS; BOTH EYES: Status: ACTIVE | Noted: 2023-01-28

## 2023-02-06 PROBLEM — H35.033 HYPERTENSIVE RETINOPATHY; BOTH EYES: Status: ACTIVE | Noted: 2023-01-28

## 2023-02-06 PROBLEM — H16.223 DRY EYE SYNDROME K SICCA; BOTH EYES: Status: ACTIVE | Noted: 2023-01-28

## 2023-02-06 PROBLEM — H17.9 CORNEAL SCAR ; LEFT EYE: Status: ACTIVE | Noted: 2023-01-28

## 2023-02-06 PROBLEM — H52.4 PRESBYOPIA: Status: ACTIVE | Noted: 2023-02-06

## 2023-02-06 PROCEDURE — 92014 COMPRE OPH EXAM EST PT 1/>: CPT | Performed by: OPTOMETRIST

## 2023-02-06 ASSESSMENT — SUPERFICIAL PUNCTATE KERATITIS (SPK)
OD_SPK: 1+
OS_SPK: 1+

## 2023-02-06 ASSESSMENT — TONOMETRY
OD_IOP_MMHG: 11
OS_IOP_MMHG: 13

## 2023-02-06 ASSESSMENT — REFRACTION_MANIFEST
OD_CYLINDER: -0.50
OD_SPHERE: +2.50
OS_AXIS: 080
OS_VA1: 20/25
OS_SPHERE: +1.50
OS_VA1: 20/30
OD_VA1: 20/25
OS_SPHERE: +2.00
OD_AXIS: 105
OS_CYLINDER: -0.50
OD_VA1: 20/30
OD_ADD: +2.00
OU_VA: 20/25
OD_SPHERE: +1.50
OD_CYLINDER: SPH
OS_CYLINDER: SPH
OS_ADD: +2.00

## 2023-02-06 ASSESSMENT — KERATOMETRY
OD_AXISANGLE_DEGREES: 075
OD_K1POWER_DIOPTERS: 44.00
OS_AXISANGLE_DEGREES: 090
OD_K2POWER_DIOPTERS: 45.25
OS_K1POWER_DIOPTERS: 44.50
OS_K2POWER_DIOPTERS: 45.25

## 2023-02-06 ASSESSMENT — REFRACTION_CURRENTRX
OD_OVR_VA: 20/
OS_VPRISM_DIRECTION: SV
OS_CYLINDER: SPH
OS_OVR_VA: 20/
OD_SPHERE: +2.50
OD_VPRISM_DIRECTION: SV
OS_SPHERE: +2.50
OD_CYLINDER: SPH

## 2023-02-06 ASSESSMENT — REFRACTION_AUTOREFRACTION
OD_SPHERE: +2.50
OS_SPHERE: +1.75
OD_AXIS: 105
OS_CYLINDER: -0.50
OS_AXIS: 080
OD_CYLINDER: -0.75

## 2023-02-06 ASSESSMENT — VISUAL ACUITY
OS_BCVA: 20/150
OD_BCVA: 20/100

## 2023-02-06 ASSESSMENT — CONFRONTATIONAL VISUAL FIELD TEST (CVF)
OD_FINDINGS: FULL
OS_FINDINGS: FULL

## 2023-02-06 ASSESSMENT — AXIALLENGTH_DERIVED
OS_AL: 22.4619
OD_AL: 22.4128
OS_AL: 22.5505
OD_AL: 22.3689

## 2023-02-06 ASSESSMENT — SPHEQUIV_DERIVED
OD_SPHEQUIV: 2.25
OS_SPHEQUIV: 1.75
OD_SPHEQUIV: 2.125
OS_SPHEQUIV: 1.5

## 2023-02-06 ASSESSMENT — CORNEAL SURGICAL SCARRING: OS_SCARRING: STROMAL

## 2023-02-20 ENCOUNTER — APPOINTMENT (OUTPATIENT)
Dept: CT IMAGING | Facility: CLINIC | Age: 58
End: 2023-02-20
Payer: MEDICAID

## 2023-02-20 ENCOUNTER — OUTPATIENT (OUTPATIENT)
Dept: OUTPATIENT SERVICES | Facility: HOSPITAL | Age: 58
LOS: 1 days | End: 2023-02-20
Payer: COMMERCIAL

## 2023-02-20 DIAGNOSIS — J84.10 PULMONARY FIBROSIS, UNSPECIFIED: ICD-10-CM

## 2023-02-20 PROCEDURE — 71250 CT THORAX DX C-: CPT | Mod: 26

## 2023-02-20 PROCEDURE — 71250 CT THORAX DX C-: CPT

## 2023-02-23 ENCOUNTER — NON-APPOINTMENT (OUTPATIENT)
Age: 58
End: 2023-02-23

## 2023-02-23 DIAGNOSIS — J84.10 PULMONARY FIBROSIS, UNSPECIFIED: ICD-10-CM

## 2023-11-14 NOTE — PHYSICAL THERAPY INITIAL EVALUATION ADULT - IMPAIRMENTS FOUND, PT EVAL
aerobic capacity/endurance/gait, locomotion, and balance/muscle strength Thalidomide Counseling: I discussed with the patient the risks of thalidomide including but not limited to birth defects, anxiety, weakness, chest pain, dizziness, cough and severe allergy.

## 2024-02-19 ENCOUNTER — APPOINTMENT (OUTPATIENT)
Dept: CT IMAGING | Facility: CLINIC | Age: 59
End: 2024-02-19

## 2024-05-29 ENCOUNTER — APPOINTMENT (OUTPATIENT)
Dept: PULMONOLOGY | Facility: CLINIC | Age: 59
End: 2024-05-29
Payer: MEDICAID

## 2024-05-29 VITALS
OXYGEN SATURATION: 98 % | DIASTOLIC BLOOD PRESSURE: 70 MMHG | RESPIRATION RATE: 16 BRPM | SYSTOLIC BLOOD PRESSURE: 120 MMHG | HEART RATE: 64 BPM

## 2024-05-29 VITALS — WEIGHT: 184 LBS | BODY MASS INDEX: 32.6 KG/M2 | HEIGHT: 63 IN

## 2024-05-29 DIAGNOSIS — J45.901 UNSPECIFIED ASTHMA WITH (ACUTE) EXACERBATION: ICD-10-CM

## 2024-05-29 DIAGNOSIS — G47.33 OBSTRUCTIVE SLEEP APNEA (ADULT) (PEDIATRIC): ICD-10-CM

## 2024-05-29 DIAGNOSIS — J45.909 UNSPECIFIED ASTHMA, UNCOMPLICATED: ICD-10-CM

## 2024-05-29 PROCEDURE — 94010 BREATHING CAPACITY TEST: CPT

## 2024-05-29 PROCEDURE — 99214 OFFICE O/P EST MOD 30 MIN: CPT | Mod: 25

## 2024-05-29 RX ORDER — FLUTICASONE FUROATE AND VILANTEROL TRIFENATATE 200; 25 UG/1; UG/1
200-25 POWDER RESPIRATORY (INHALATION) DAILY
Qty: 3 | Refills: 5 | Status: DISCONTINUED | COMMUNITY
Start: 2021-07-13 | End: 2024-05-29

## 2024-05-29 RX ORDER — FLUTICASONE FUROATE, UMECLIDINIUM BROMIDE AND VILANTEROL TRIFENATATE 200; 62.5; 25 UG/1; UG/1; UG/1
200-62.5-25 POWDER RESPIRATORY (INHALATION)
Qty: 1 | Refills: 5 | Status: ACTIVE | COMMUNITY
Start: 2024-05-29 | End: 1900-01-01

## 2024-05-29 RX ORDER — PREDNISONE 10 MG/1
10 TABLET ORAL
Qty: 42 | Refills: 1 | Status: DISCONTINUED | COMMUNITY
Start: 2022-02-25 | End: 2024-05-29

## 2024-05-29 NOTE — CONSULT LETTER
[Dear  ___] : Dear  [unfilled], [Consult Letter:] : I had the pleasure of evaluating your patient, [unfilled]. [Please see my note below.] : Please see my note below. [Consult Closing:] : Thank you very much for allowing me to participate in the care of this patient.  If you have any questions, please do not hesitate to contact me. [Sincerely,] : Sincerely, [FreeTextEntry3] : Noé Monique MD FCCP Pulmonary/Critical Care/Sleep Medicine Department of Internal Medicine  [facility]

## 2024-05-29 NOTE — DISCUSSION/SUMMARY
[Obstructive Sleep Apnea] : obstructive sleep apnea [None] : There are no changes in medication management [Alcohol Avoidance] : alcohol avoidance [Sedative Avoidance] : sedative avoidance [Weight Loss Program] : weight loss program [FreeTextEntry1] : 58-year-old male with a history of moderate persistent asthma seen today in follow-up.  Patient unfortunately suffered an exacerbation with pulmonary functions once again at baseline.  Due to this recent exacerbation and some residual shortness of breath I am changing his Breo to Trelegy.  In addition of chest x-ray will be performed and the patient will be requested to follow-up in this office in 2 months. [de-identified] : family to obtain results of HST and send to me

## 2024-05-29 NOTE — REASON FOR VISIT
[Follow-Up] : a follow-up visit [Asthma] : asthma [Interpreters_Relationshiptopatient] : Wife [TWNoteComboBox1] : Ukrainian

## 2024-05-29 NOTE — PROCEDURE
[de-identified] : 51 patient with DM presents to clinic for evaluation of his left hip. Patient has had several years of hip pain. He was seeing Dr Jorge for his hip pain and was planning on proceeding with a LAKESHA but his Hg A1C was elevated so surgery was postponed. The patient has failed non operative mgmt and got his Hg A1C down to 6.8 so he would like to proceed with a L LAKESHA at this time. He says that his pain is very severe and worse with ambulation, stairs, bending over and sit to stand. His QOL has been greatly effected and he is ready for a hip replacement.
[FreeTextEntry1] : 3/29/2022: Pulmonary function test demonstrate a mild degree of restriction without significant airway obstruction.  Normal diffusion capacity.  Marked improvement when compared to previous values of 7/2021.  9/20/2022: Pulmonary function test demonstrates a very mild degree of restriction versus lower limits of normal with a normal diffusion capacity.  No significant change from previous spirometry of 3/29/2022  5/29/2024: Spirometry-normal

## 2024-05-29 NOTE — HISTORY OF PRESENT ILLNESS
[Never] : never [3  -  Moderate] : 3, moderate [Obstructive Sleep Apnea] : obstructive sleep apnea [Awakes Unrefreshed] : awakes unrefreshed [Awakes with Dry Mouth] : awakes with dry mouth [Awakes with Headache] : awakes with headache [Daytime Somnolence] : daytime somnolence [Snoring] : snoring [Witnessed Apneas] : witnessed apneas [Wheezing] : denies wheezing [Cough] : denies coughing [Chest Tightness Or Heavy Pressure] : denies chest tightness [Feelings Of Weakness On Exertion] : denies reduced exercise tolerance [None] : None [Adherent] : the patient is adherent with ~his/her~ medication regimen [Shortness Of Breath] : worsened shortness of breath [TextBox_77] : 9516 [TextBox_79] : 1000 [TextBox_89] : 2-3 [TextBox_93] : Memory loss [TextBox_165] : Had sleep study at another site 2 months ago. Does not have results [ESS] : 18 [de-identified] : COVID-19 in 2021 complicated by severe hypoxic respiratory failure with a second episode in February 2022. [de-identified] : URI 1month treated with prednisone x 1 wk

## 2024-06-07 ENCOUNTER — OUTPATIENT (OUTPATIENT)
Dept: OUTPATIENT SERVICES | Facility: HOSPITAL | Age: 59
LOS: 1 days | End: 2024-06-07
Payer: MEDICAID

## 2024-06-07 DIAGNOSIS — J45.909 UNSPECIFIED ASTHMA, UNCOMPLICATED: ICD-10-CM

## 2024-06-07 PROCEDURE — 71046 X-RAY EXAM CHEST 2 VIEWS: CPT | Mod: 26

## 2024-06-11 ENCOUNTER — NON-APPOINTMENT (OUTPATIENT)
Age: 59
End: 2024-06-11

## 2024-08-09 ENCOUNTER — APPOINTMENT (OUTPATIENT)
Dept: PULMONOLOGY | Facility: CLINIC | Age: 59
End: 2024-08-09

## 2024-11-20 ENCOUNTER — NON-APPOINTMENT (OUTPATIENT)
Age: 59
End: 2024-11-20

## 2024-11-20 DIAGNOSIS — K21.9 GASTRO-ESOPHAGEAL REFLUX DISEASE W/OUT ESOPHAGITIS: ICD-10-CM

## 2024-11-20 DIAGNOSIS — R80.9 PROTEINURIA, UNSPECIFIED: ICD-10-CM

## 2024-11-20 DIAGNOSIS — N18.1 CHRONIC KIDNEY DISEASE, STAGE 1: ICD-10-CM

## 2024-11-20 DIAGNOSIS — N03.8: ICD-10-CM

## 2024-11-20 RX ORDER — ATORVASTATIN CALCIUM 80 MG/1
80 TABLET, FILM COATED ORAL DAILY
Refills: 0 | Status: ACTIVE | COMMUNITY